# Patient Record
Sex: MALE | Race: BLACK OR AFRICAN AMERICAN | NOT HISPANIC OR LATINO
[De-identification: names, ages, dates, MRNs, and addresses within clinical notes are randomized per-mention and may not be internally consistent; named-entity substitution may affect disease eponyms.]

---

## 2019-05-09 ENCOUNTER — RESULT REVIEW (OUTPATIENT)
Age: 44
End: 2019-05-09

## 2019-05-09 ENCOUNTER — INPATIENT (INPATIENT)
Facility: HOSPITAL | Age: 44
LOS: 7 days | Discharge: HOME HEALTH | DRG: 219 | End: 2019-05-17
Attending: THORACIC SURGERY (CARDIOTHORACIC VASCULAR SURGERY) | Admitting: THORACIC SURGERY (CARDIOTHORACIC VASCULAR SURGERY)
Payer: COMMERCIAL

## 2019-05-09 ENCOUNTER — APPOINTMENT (OUTPATIENT)
Dept: CARDIOTHORACIC SURGERY | Facility: HOSPITAL | Age: 44
End: 2019-05-09
Payer: COMMERCIAL

## 2019-05-09 LAB
ALBUMIN SERPL ELPH-MCNC: 3.4 G/DL — SIGNIFICANT CHANGE UP (ref 3.3–5)
ALP SERPL-CCNC: 29 U/L — LOW (ref 40–120)
ALT FLD-CCNC: 13 U/L — SIGNIFICANT CHANGE UP (ref 10–45)
AMPHET UR-MCNC: NEGATIVE — SIGNIFICANT CHANGE UP
ANION GAP SERPL CALC-SCNC: 13 MMOL/L — SIGNIFICANT CHANGE UP (ref 5–17)
ANISOCYTOSIS BLD QL: SLIGHT — SIGNIFICANT CHANGE UP
APTT BLD: 31.6 SEC — SIGNIFICANT CHANGE UP (ref 27.5–36.3)
AST SERPL-CCNC: 18 U/L — SIGNIFICANT CHANGE UP (ref 10–40)
BARBITURATES UR SCN-MCNC: NEGATIVE — SIGNIFICANT CHANGE UP
BASOPHILS # BLD AUTO: 0.06 K/UL — SIGNIFICANT CHANGE UP (ref 0–0.2)
BASOPHILS NFR BLD AUTO: 0.9 % — SIGNIFICANT CHANGE UP (ref 0–2)
BENZODIAZ UR-MCNC: NEGATIVE — SIGNIFICANT CHANGE UP
BILIRUB SERPL-MCNC: 0.3 MG/DL — SIGNIFICANT CHANGE UP (ref 0.2–1.2)
BUN SERPL-MCNC: 14 MG/DL — SIGNIFICANT CHANGE UP (ref 7–23)
BURR CELLS BLD QL SMEAR: PRESENT — SIGNIFICANT CHANGE UP
CALCIUM SERPL-MCNC: 9.1 MG/DL — SIGNIFICANT CHANGE UP (ref 8.4–10.5)
CHLORIDE SERPL-SCNC: 104 MMOL/L — SIGNIFICANT CHANGE UP (ref 96–108)
CO2 SERPL-SCNC: 22 MMOL/L — SIGNIFICANT CHANGE UP (ref 22–31)
COCAINE METAB.OTHER UR-MCNC: NEGATIVE — SIGNIFICANT CHANGE UP
CREAT SERPL-MCNC: 1.2 MG/DL — SIGNIFICANT CHANGE UP (ref 0.5–1.3)
D DIMER BLD IA.RAPID-MCNC: 2380 NG/ML DDU — HIGH
DACRYOCYTES BLD QL SMEAR: SLIGHT — SIGNIFICANT CHANGE UP
EOSINOPHIL # BLD AUTO: 0 K/UL — SIGNIFICANT CHANGE UP (ref 0–0.5)
EOSINOPHIL NFR BLD AUTO: 0 % — SIGNIFICANT CHANGE UP (ref 0–6)
FIBRINOGEN PPP-MCNC: 244 MG/DL — LOW (ref 258–438)
GIANT PLATELETS BLD QL SMEAR: PRESENT — SIGNIFICANT CHANGE UP
GLUCOSE SERPL-MCNC: 134 MG/DL — HIGH (ref 70–99)
HCT VFR BLD CALC: 37.4 % — LOW (ref 39–50)
HGB BLD-MCNC: 11.7 G/DL — LOW (ref 13–17)
INR BLD: 1.32 — HIGH (ref 0.88–1.16)
LACTATE SERPL-SCNC: 2.1 MMOL/L — HIGH (ref 0.5–2)
LYMPHOCYTES # BLD AUTO: 0.6 K/UL — LOW (ref 1–3.3)
LYMPHOCYTES # BLD AUTO: 8.8 % — LOW (ref 13–44)
MANUAL SMEAR VERIFICATION: SIGNIFICANT CHANGE UP
MCHC RBC-ENTMCNC: 21.7 PG — LOW (ref 27–34)
MCHC RBC-ENTMCNC: 31.3 GM/DL — LOW (ref 32–36)
MCV RBC AUTO: 69.5 FL — LOW (ref 80–100)
METHADONE UR-MCNC: NEGATIVE — SIGNIFICANT CHANGE UP
MONOCYTES # BLD AUTO: 0.3 K/UL — SIGNIFICANT CHANGE UP (ref 0–0.9)
MONOCYTES NFR BLD AUTO: 4.4 % — SIGNIFICANT CHANGE UP (ref 2–14)
NEUTROPHILS # BLD AUTO: 5.78 K/UL — SIGNIFICANT CHANGE UP (ref 1.8–7.4)
NEUTROPHILS NFR BLD AUTO: 78 % — HIGH (ref 43–77)
NEUTS BAND # BLD: 7 % — SIGNIFICANT CHANGE UP (ref 0–8)
NRBC # BLD: 1 /100 — HIGH (ref 0–0)
NRBC # BLD: SIGNIFICANT CHANGE UP /100 WBCS (ref 0–0)
OPIATES UR-MCNC: NEGATIVE — SIGNIFICANT CHANGE UP
OVALOCYTES BLD QL SMEAR: SLIGHT — SIGNIFICANT CHANGE UP
PCP SPEC-MCNC: SIGNIFICANT CHANGE UP
PCP UR-MCNC: NEGATIVE — SIGNIFICANT CHANGE UP
PLAT MORPH BLD: ABNORMAL
PLATELET # BLD AUTO: 182 K/UL — SIGNIFICANT CHANGE UP (ref 150–400)
POIKILOCYTOSIS BLD QL AUTO: SLIGHT — SIGNIFICANT CHANGE UP
POTASSIUM SERPL-MCNC: 4.1 MMOL/L — SIGNIFICANT CHANGE UP (ref 3.5–5.3)
POTASSIUM SERPL-SCNC: 4.1 MMOL/L — SIGNIFICANT CHANGE UP (ref 3.5–5.3)
PROT SERPL-MCNC: 6.3 G/DL — SIGNIFICANT CHANGE UP (ref 6–8.3)
PROTHROM AB SERPL-ACNC: 15 SEC — HIGH (ref 10–12.9)
RBC # BLD: 5.38 M/UL — SIGNIFICANT CHANGE UP (ref 4.2–5.8)
RBC # FLD: 17.7 % — HIGH (ref 10.3–14.5)
RBC BLD AUTO: ABNORMAL
SCHISTOCYTES BLD QL AUTO: SLIGHT — SIGNIFICANT CHANGE UP
SODIUM SERPL-SCNC: 139 MMOL/L — SIGNIFICANT CHANGE UP (ref 135–145)
THC UR QL: NEGATIVE — SIGNIFICANT CHANGE UP
VARIANT LYMPHS # BLD: 0.9 % — SIGNIFICANT CHANGE UP (ref 0–6)
WBC # BLD: 6.8 K/UL — SIGNIFICANT CHANGE UP (ref 3.8–10.5)
WBC # FLD AUTO: 6.8 K/UL — SIGNIFICANT CHANGE UP (ref 3.8–10.5)

## 2019-05-09 PROCEDURE — 35206 REPAIR BLOOD VESSEL DIR UXTR: CPT | Mod: 80,59

## 2019-05-09 PROCEDURE — 35206 REPAIR BLOOD VESSEL DIR UXTR: CPT | Mod: 59

## 2019-05-09 PROCEDURE — 33866 AORTIC HEMIARCH GRAFT: CPT | Mod: 59

## 2019-05-09 PROCEDURE — 33860: CPT | Mod: 22

## 2019-05-09 PROCEDURE — 33866 AORTIC HEMIARCH GRAFT: CPT | Mod: 80,59

## 2019-05-09 PROCEDURE — 99223 1ST HOSP IP/OBS HIGH 75: CPT

## 2019-05-09 PROCEDURE — 33860: CPT | Mod: 80,22

## 2019-05-09 PROCEDURE — 33391 VALVULOPLASTY AORTIC VALVE: CPT

## 2019-05-09 PROCEDURE — 33880 EVASC RPR TA NDGFT COV LSA: CPT | Mod: 80,59

## 2019-05-09 PROCEDURE — 33880 EVASC RPR TA NDGFT COV LSA: CPT

## 2019-05-09 RX ORDER — SODIUM CHLORIDE 9 MG/ML
1000 INJECTION INTRAMUSCULAR; INTRAVENOUS; SUBCUTANEOUS
Refills: 0 | Status: DISCONTINUED | OUTPATIENT
Start: 2019-05-10 | End: 2019-05-15

## 2019-05-09 RX ORDER — VASOPRESSIN 20 [USP'U]/ML
0.04 INJECTION INTRAVENOUS
Qty: 50 | Refills: 0 | Status: DISCONTINUED | OUTPATIENT
Start: 2019-05-09 | End: 2019-05-10

## 2019-05-09 RX ORDER — DEXTROSE 50 % IN WATER 50 %
50 SYRINGE (ML) INTRAVENOUS
Refills: 0 | Status: DISCONTINUED | OUTPATIENT
Start: 2019-05-10 | End: 2019-05-16

## 2019-05-09 RX ORDER — MEPERIDINE HYDROCHLORIDE 50 MG/ML
25 INJECTION INTRAMUSCULAR; INTRAVENOUS; SUBCUTANEOUS ONCE
Refills: 0 | Status: DISCONTINUED | OUTPATIENT
Start: 2019-05-10 | End: 2019-05-10

## 2019-05-09 RX ORDER — DEXTROSE 50 % IN WATER 50 %
25 SYRINGE (ML) INTRAVENOUS
Refills: 0 | Status: DISCONTINUED | OUTPATIENT
Start: 2019-05-10 | End: 2019-05-16

## 2019-05-09 RX ORDER — CHLORHEXIDINE GLUCONATE 213 G/1000ML
1 SOLUTION TOPICAL DAILY
Refills: 0 | Status: DISCONTINUED | OUTPATIENT
Start: 2019-05-10 | End: 2019-05-15

## 2019-05-10 VITALS — OXYGEN SATURATION: 100 % | HEART RATE: 60 BPM | RESPIRATION RATE: 12 BRPM

## 2019-05-10 PROBLEM — Z00.00 ENCOUNTER FOR PREVENTIVE HEALTH EXAMINATION: Status: ACTIVE | Noted: 2019-05-10

## 2019-05-10 LAB
ALBUMIN SERPL ELPH-MCNC: 3.1 G/DL — LOW (ref 3.3–5)
ALBUMIN SERPL ELPH-MCNC: 3.4 G/DL — SIGNIFICANT CHANGE UP (ref 3.3–5)
ALBUMIN SERPL ELPH-MCNC: 3.5 G/DL — SIGNIFICANT CHANGE UP (ref 3.3–5)
ALBUMIN SERPL ELPH-MCNC: 3.6 G/DL — SIGNIFICANT CHANGE UP (ref 3.3–5)
ALBUMIN SERPL ELPH-MCNC: 3.7 G/DL — SIGNIFICANT CHANGE UP (ref 3.3–5)
ALP SERPL-CCNC: 36 U/L — LOW (ref 40–120)
ALP SERPL-CCNC: 38 U/L — LOW (ref 40–120)
ALP SERPL-CCNC: 38 U/L — LOW (ref 40–120)
ALP SERPL-CCNC: 41 U/L — SIGNIFICANT CHANGE UP (ref 40–120)
ALP SERPL-CCNC: 42 U/L — SIGNIFICANT CHANGE UP (ref 40–120)
ALT FLD-CCNC: 23 U/L — SIGNIFICANT CHANGE UP (ref 10–45)
ALT FLD-CCNC: 25 U/L — SIGNIFICANT CHANGE UP (ref 10–45)
ALT FLD-CCNC: 26 U/L — SIGNIFICANT CHANGE UP (ref 10–45)
ALT FLD-CCNC: 27 U/L — SIGNIFICANT CHANGE UP (ref 10–45)
ALT FLD-CCNC: 30 U/L — SIGNIFICANT CHANGE UP (ref 10–45)
ANION GAP SERPL CALC-SCNC: 11 MMOL/L — SIGNIFICANT CHANGE UP (ref 5–17)
ANION GAP SERPL CALC-SCNC: 12 MMOL/L — SIGNIFICANT CHANGE UP (ref 5–17)
ANION GAP SERPL CALC-SCNC: 14 MMOL/L — SIGNIFICANT CHANGE UP (ref 5–17)
APTT BLD: 27.7 SEC — SIGNIFICANT CHANGE UP (ref 27.5–36.3)
APTT BLD: 28.6 SEC — SIGNIFICANT CHANGE UP (ref 27.5–36.3)
APTT BLD: 29.8 SEC — SIGNIFICANT CHANGE UP (ref 27.5–36.3)
APTT BLD: 29.9 SEC — SIGNIFICANT CHANGE UP (ref 27.5–36.3)
APTT BLD: 30.4 SEC — SIGNIFICANT CHANGE UP (ref 27.5–36.3)
AST SERPL-CCNC: 35 U/L — SIGNIFICANT CHANGE UP (ref 10–40)
AST SERPL-CCNC: 38 U/L — SIGNIFICANT CHANGE UP (ref 10–40)
AST SERPL-CCNC: 39 U/L — SIGNIFICANT CHANGE UP (ref 10–40)
AST SERPL-CCNC: 43 U/L — HIGH (ref 10–40)
AST SERPL-CCNC: 45 U/L — HIGH (ref 10–40)
BASOPHILS # BLD AUTO: 0.01 K/UL — SIGNIFICANT CHANGE UP (ref 0–0.2)
BASOPHILS NFR BLD AUTO: 0.1 % — SIGNIFICANT CHANGE UP (ref 0–2)
BILIRUB SERPL-MCNC: 0.4 MG/DL — SIGNIFICANT CHANGE UP (ref 0.2–1.2)
BILIRUB SERPL-MCNC: 0.6 MG/DL — SIGNIFICANT CHANGE UP (ref 0.2–1.2)
BILIRUB SERPL-MCNC: 0.8 MG/DL — SIGNIFICANT CHANGE UP (ref 0.2–1.2)
BUN SERPL-MCNC: 14 MG/DL — SIGNIFICANT CHANGE UP (ref 7–23)
BUN SERPL-MCNC: 14 MG/DL — SIGNIFICANT CHANGE UP (ref 7–23)
BUN SERPL-MCNC: 17 MG/DL — SIGNIFICANT CHANGE UP (ref 7–23)
BUN SERPL-MCNC: 19 MG/DL — SIGNIFICANT CHANGE UP (ref 7–23)
BUN SERPL-MCNC: 21 MG/DL — SIGNIFICANT CHANGE UP (ref 7–23)
CALCIUM SERPL-MCNC: 10.3 MG/DL — SIGNIFICANT CHANGE UP (ref 8.4–10.5)
CALCIUM SERPL-MCNC: 9.1 MG/DL — SIGNIFICANT CHANGE UP (ref 8.4–10.5)
CALCIUM SERPL-MCNC: 9.1 MG/DL — SIGNIFICANT CHANGE UP (ref 8.4–10.5)
CALCIUM SERPL-MCNC: 9.3 MG/DL — SIGNIFICANT CHANGE UP (ref 8.4–10.5)
CALCIUM SERPL-MCNC: 9.9 MG/DL — SIGNIFICANT CHANGE UP (ref 8.4–10.5)
CHLORIDE SERPL-SCNC: 102 MMOL/L — SIGNIFICANT CHANGE UP (ref 96–108)
CHLORIDE SERPL-SCNC: 104 MMOL/L — SIGNIFICANT CHANGE UP (ref 96–108)
CHLORIDE SERPL-SCNC: 104 MMOL/L — SIGNIFICANT CHANGE UP (ref 96–108)
CHOLEST SERPL-MCNC: 152 MG/DL — SIGNIFICANT CHANGE UP (ref 10–199)
CO2 SERPL-SCNC: 25 MMOL/L — SIGNIFICANT CHANGE UP (ref 22–31)
CO2 SERPL-SCNC: 25 MMOL/L — SIGNIFICANT CHANGE UP (ref 22–31)
CO2 SERPL-SCNC: 26 MMOL/L — SIGNIFICANT CHANGE UP (ref 22–31)
CO2 SERPL-SCNC: 26 MMOL/L — SIGNIFICANT CHANGE UP (ref 22–31)
CO2 SERPL-SCNC: 27 MMOL/L — SIGNIFICANT CHANGE UP (ref 22–31)
CREAT SERPL-MCNC: 1.24 MG/DL — SIGNIFICANT CHANGE UP (ref 0.5–1.3)
CREAT SERPL-MCNC: 1.27 MG/DL — SIGNIFICANT CHANGE UP (ref 0.5–1.3)
CREAT SERPL-MCNC: 1.29 MG/DL — SIGNIFICANT CHANGE UP (ref 0.5–1.3)
CREAT SERPL-MCNC: 1.38 MG/DL — HIGH (ref 0.5–1.3)
CREAT SERPL-MCNC: 1.4 MG/DL — HIGH (ref 0.5–1.3)
EOSINOPHIL # BLD AUTO: 0.02 K/UL — SIGNIFICANT CHANGE UP (ref 0–0.5)
EOSINOPHIL NFR BLD AUTO: 0.1 % — SIGNIFICANT CHANGE UP (ref 0–6)
GAS PNL BLDA: SIGNIFICANT CHANGE UP
GLUCOSE BLDC GLUCOMTR-MCNC: 105 MG/DL — HIGH (ref 70–99)
GLUCOSE BLDC GLUCOMTR-MCNC: 107 MG/DL — HIGH (ref 70–99)
GLUCOSE BLDC GLUCOMTR-MCNC: 110 MG/DL — HIGH (ref 70–99)
GLUCOSE BLDC GLUCOMTR-MCNC: 122 MG/DL — HIGH (ref 70–99)
GLUCOSE BLDC GLUCOMTR-MCNC: 131 MG/DL — HIGH (ref 70–99)
GLUCOSE BLDC GLUCOMTR-MCNC: 131 MG/DL — HIGH (ref 70–99)
GLUCOSE BLDC GLUCOMTR-MCNC: 136 MG/DL — HIGH (ref 70–99)
GLUCOSE BLDC GLUCOMTR-MCNC: 171 MG/DL — HIGH (ref 70–99)
GLUCOSE BLDC GLUCOMTR-MCNC: 71 MG/DL — SIGNIFICANT CHANGE UP (ref 70–99)
GLUCOSE BLDC GLUCOMTR-MCNC: 97 MG/DL — SIGNIFICANT CHANGE UP (ref 70–99)
GLUCOSE BLDC GLUCOMTR-MCNC: 98 MG/DL — SIGNIFICANT CHANGE UP (ref 70–99)
GLUCOSE SERPL-MCNC: 114 MG/DL — HIGH (ref 70–99)
GLUCOSE SERPL-MCNC: 116 MG/DL — HIGH (ref 70–99)
GLUCOSE SERPL-MCNC: 134 MG/DL — HIGH (ref 70–99)
GLUCOSE SERPL-MCNC: 182 MG/DL — HIGH (ref 70–99)
GLUCOSE SERPL-MCNC: 93 MG/DL — SIGNIFICANT CHANGE UP (ref 70–99)
HBA1C BLD-MCNC: 5.4 % — SIGNIFICANT CHANGE UP (ref 4–5.6)
HCT VFR BLD CALC: 26.2 % — LOW (ref 39–50)
HCT VFR BLD CALC: 27.5 % — LOW (ref 39–50)
HCT VFR BLD CALC: 28.3 % — LOW (ref 39–50)
HCT VFR BLD CALC: 29.2 % — LOW (ref 39–50)
HCT VFR BLD CALC: 29.3 % — LOW (ref 39–50)
HDLC SERPL-MCNC: 49 MG/DL — SIGNIFICANT CHANGE UP
HGB BLD-MCNC: 8.6 G/DL — LOW (ref 13–17)
HGB BLD-MCNC: 8.9 G/DL — LOW (ref 13–17)
HGB BLD-MCNC: 9 G/DL — LOW (ref 13–17)
HGB BLD-MCNC: 9.2 G/DL — LOW (ref 13–17)
HGB BLD-MCNC: 9.6 G/DL — LOW (ref 13–17)
IMM GRANULOCYTES NFR BLD AUTO: 0.8 % — SIGNIFICANT CHANGE UP (ref 0–1.5)
INR BLD: 1.24 — HIGH (ref 0.88–1.16)
INR BLD: 1.31 — HIGH (ref 0.88–1.16)
INR BLD: 1.34 — HIGH (ref 0.88–1.16)
INR BLD: 1.42 — HIGH (ref 0.88–1.16)
INR BLD: 1.5 — HIGH (ref 0.88–1.16)
LACTATE SERPL-SCNC: 0.9 MMOL/L — SIGNIFICANT CHANGE UP (ref 0.5–2)
LACTATE SERPL-SCNC: 1 MMOL/L — SIGNIFICANT CHANGE UP (ref 0.5–2)
LACTATE SERPL-SCNC: 1.7 MMOL/L — SIGNIFICANT CHANGE UP (ref 0.5–2)
LACTATE SERPL-SCNC: 1.8 MMOL/L — SIGNIFICANT CHANGE UP (ref 0.5–2)
LACTATE SERPL-SCNC: 3 MMOL/L — HIGH (ref 0.5–2)
LIPID PNL WITH DIRECT LDL SERPL: 86 MG/DL — SIGNIFICANT CHANGE UP
LYMPHOCYTES # BLD AUTO: 0.86 K/UL — LOW (ref 1–3.3)
LYMPHOCYTES # BLD AUTO: 6.1 % — LOW (ref 13–44)
MAGNESIUM SERPL-MCNC: 2.2 MG/DL — SIGNIFICANT CHANGE UP (ref 1.6–2.6)
MAGNESIUM SERPL-MCNC: 2.3 MG/DL — SIGNIFICANT CHANGE UP (ref 1.6–2.6)
MAGNESIUM SERPL-MCNC: 2.4 MG/DL — SIGNIFICANT CHANGE UP (ref 1.6–2.6)
MCHC RBC-ENTMCNC: 21.7 PG — LOW (ref 27–34)
MCHC RBC-ENTMCNC: 22 PG — LOW (ref 27–34)
MCHC RBC-ENTMCNC: 22.2 PG — LOW (ref 27–34)
MCHC RBC-ENTMCNC: 22.4 PG — LOW (ref 27–34)
MCHC RBC-ENTMCNC: 22.5 PG — LOW (ref 27–34)
MCHC RBC-ENTMCNC: 31.5 GM/DL — LOW (ref 32–36)
MCHC RBC-ENTMCNC: 31.8 GM/DL — LOW (ref 32–36)
MCHC RBC-ENTMCNC: 32.4 GM/DL — SIGNIFICANT CHANGE UP (ref 32–36)
MCHC RBC-ENTMCNC: 32.8 GM/DL — SIGNIFICANT CHANGE UP (ref 32–36)
MCHC RBC-ENTMCNC: 32.8 GM/DL — SIGNIFICANT CHANGE UP (ref 32–36)
MCV RBC AUTO: 68.3 FL — LOW (ref 80–100)
MCV RBC AUTO: 68.4 FL — LOW (ref 80–100)
MCV RBC AUTO: 68.6 FL — LOW (ref 80–100)
MCV RBC AUTO: 69 FL — LOW (ref 80–100)
MCV RBC AUTO: 69.2 FL — LOW (ref 80–100)
MONOCYTES # BLD AUTO: 0.83 K/UL — SIGNIFICANT CHANGE UP (ref 0–0.9)
MONOCYTES NFR BLD AUTO: 5.9 % — SIGNIFICANT CHANGE UP (ref 2–14)
NEUTROPHILS # BLD AUTO: 12.31 K/UL — HIGH (ref 1.8–7.4)
NEUTROPHILS NFR BLD AUTO: 87 % — HIGH (ref 43–77)
NRBC # BLD: 0 /100 WBCS — SIGNIFICANT CHANGE UP (ref 0–0)
PHOSPHATE SERPL-MCNC: 3.3 MG/DL — SIGNIFICANT CHANGE UP (ref 2.5–4.5)
PHOSPHATE SERPL-MCNC: 3.9 MG/DL — SIGNIFICANT CHANGE UP (ref 2.5–4.5)
PHOSPHATE SERPL-MCNC: 5.2 MG/DL — HIGH (ref 2.5–4.5)
PHOSPHATE SERPL-MCNC: 5.3 MG/DL — HIGH (ref 2.5–4.5)
PHOSPHATE SERPL-MCNC: 5.5 MG/DL — HIGH (ref 2.5–4.5)
PLATELET # BLD AUTO: 162 K/UL — SIGNIFICANT CHANGE UP (ref 150–400)
PLATELET # BLD AUTO: 169 K/UL — SIGNIFICANT CHANGE UP (ref 150–400)
PLATELET # BLD AUTO: 176 K/UL — SIGNIFICANT CHANGE UP (ref 150–400)
PLATELET # BLD AUTO: 177 K/UL — SIGNIFICANT CHANGE UP (ref 150–400)
PLATELET # BLD AUTO: 184 K/UL — SIGNIFICANT CHANGE UP (ref 150–400)
POTASSIUM SERPL-MCNC: 3.6 MMOL/L — SIGNIFICANT CHANGE UP (ref 3.5–5.3)
POTASSIUM SERPL-MCNC: 4 MMOL/L — SIGNIFICANT CHANGE UP (ref 3.5–5.3)
POTASSIUM SERPL-MCNC: 4 MMOL/L — SIGNIFICANT CHANGE UP (ref 3.5–5.3)
POTASSIUM SERPL-MCNC: 4.3 MMOL/L — SIGNIFICANT CHANGE UP (ref 3.5–5.3)
POTASSIUM SERPL-MCNC: 4.3 MMOL/L — SIGNIFICANT CHANGE UP (ref 3.5–5.3)
POTASSIUM SERPL-SCNC: 3.6 MMOL/L — SIGNIFICANT CHANGE UP (ref 3.5–5.3)
POTASSIUM SERPL-SCNC: 4 MMOL/L — SIGNIFICANT CHANGE UP (ref 3.5–5.3)
POTASSIUM SERPL-SCNC: 4 MMOL/L — SIGNIFICANT CHANGE UP (ref 3.5–5.3)
POTASSIUM SERPL-SCNC: 4.3 MMOL/L — SIGNIFICANT CHANGE UP (ref 3.5–5.3)
POTASSIUM SERPL-SCNC: 4.3 MMOL/L — SIGNIFICANT CHANGE UP (ref 3.5–5.3)
PROT SERPL-MCNC: 5.9 G/DL — LOW (ref 6–8.3)
PROT SERPL-MCNC: 6 G/DL — SIGNIFICANT CHANGE UP (ref 6–8.3)
PROT SERPL-MCNC: 6.1 G/DL — SIGNIFICANT CHANGE UP (ref 6–8.3)
PROT SERPL-MCNC: 6.5 G/DL — SIGNIFICANT CHANGE UP (ref 6–8.3)
PROT SERPL-MCNC: 6.8 G/DL — SIGNIFICANT CHANGE UP (ref 6–8.3)
PROTHROM AB SERPL-ACNC: 14.1 SEC — HIGH (ref 10–12.9)
PROTHROM AB SERPL-ACNC: 14.9 SEC — HIGH (ref 10–12.9)
PROTHROM AB SERPL-ACNC: 15.3 SEC — HIGH (ref 10–12.9)
PROTHROM AB SERPL-ACNC: 16.2 SEC — HIGH (ref 10–12.9)
PROTHROM AB SERPL-ACNC: 17.2 SEC — HIGH (ref 10–12.9)
RBC # BLD: 3.83 M/UL — LOW (ref 4.2–5.8)
RBC # BLD: 4.01 M/UL — LOW (ref 4.2–5.8)
RBC # BLD: 4.09 M/UL — LOW (ref 4.2–5.8)
RBC # BLD: 4.23 M/UL — SIGNIFICANT CHANGE UP (ref 4.2–5.8)
RBC # BLD: 4.29 M/UL — SIGNIFICANT CHANGE UP (ref 4.2–5.8)
RBC # FLD: 17.2 % — HIGH (ref 10.3–14.5)
RBC # FLD: 17.4 % — HIGH (ref 10.3–14.5)
RBC # FLD: 17.7 % — HIGH (ref 10.3–14.5)
SODIUM SERPL-SCNC: 139 MMOL/L — SIGNIFICANT CHANGE UP (ref 135–145)
SODIUM SERPL-SCNC: 139 MMOL/L — SIGNIFICANT CHANGE UP (ref 135–145)
SODIUM SERPL-SCNC: 141 MMOL/L — SIGNIFICANT CHANGE UP (ref 135–145)
SODIUM SERPL-SCNC: 141 MMOL/L — SIGNIFICANT CHANGE UP (ref 135–145)
SODIUM SERPL-SCNC: 142 MMOL/L — SIGNIFICANT CHANGE UP (ref 135–145)
T PALLIDUM AB TITR SER: NEGATIVE — SIGNIFICANT CHANGE UP
T PALLIDUM AB TITR SER: NEGATIVE — SIGNIFICANT CHANGE UP
TOTAL CHOLESTEROL/HDL RATIO MEASUREMENT: 3.1 RATIO — LOW (ref 3.4–9.6)
TRIGL SERPL-MCNC: 86 MG/DL — SIGNIFICANT CHANGE UP (ref 10–149)
WBC # BLD: 10.41 K/UL — SIGNIFICANT CHANGE UP (ref 3.8–10.5)
WBC # BLD: 10.89 K/UL — HIGH (ref 3.8–10.5)
WBC # BLD: 11.24 K/UL — HIGH (ref 3.8–10.5)
WBC # BLD: 14.14 K/UL — HIGH (ref 3.8–10.5)
WBC # BLD: 8.5 K/UL — SIGNIFICANT CHANGE UP (ref 3.8–10.5)
WBC # FLD AUTO: 10.41 K/UL — SIGNIFICANT CHANGE UP (ref 3.8–10.5)
WBC # FLD AUTO: 10.89 K/UL — HIGH (ref 3.8–10.5)
WBC # FLD AUTO: 11.24 K/UL — HIGH (ref 3.8–10.5)
WBC # FLD AUTO: 14.14 K/UL — HIGH (ref 3.8–10.5)
WBC # FLD AUTO: 8.5 K/UL — SIGNIFICANT CHANGE UP (ref 3.8–10.5)

## 2019-05-10 PROCEDURE — 71045 X-RAY EXAM CHEST 1 VIEW: CPT | Mod: 26

## 2019-05-10 PROCEDURE — 99292 CRITICAL CARE ADDL 30 MIN: CPT

## 2019-05-10 PROCEDURE — 99291 CRITICAL CARE FIRST HOUR: CPT

## 2019-05-10 PROCEDURE — 93010 ELECTROCARDIOGRAM REPORT: CPT

## 2019-05-10 RX ORDER — SENNA PLUS 8.6 MG/1
2 TABLET ORAL AT BEDTIME
Refills: 0 | Status: DISCONTINUED | OUTPATIENT
Start: 2019-05-10 | End: 2019-05-17

## 2019-05-10 RX ORDER — PROPOFOL 10 MG/ML
30 INJECTION, EMULSION INTRAVENOUS
Qty: 1000 | Refills: 0 | Status: DISCONTINUED | OUTPATIENT
Start: 2019-05-10 | End: 2019-05-10

## 2019-05-10 RX ORDER — CLEVIDIPINE BUTYRATE 50MG/100ML
15 VIAL (ML) INTRAVENOUS
Qty: 25 | Refills: 0 | Status: DISCONTINUED | OUTPATIENT
Start: 2019-05-10 | End: 2019-05-12

## 2019-05-10 RX ORDER — GLUCAGON INJECTION, SOLUTION 0.5 MG/.1ML
1 INJECTION, SOLUTION SUBCUTANEOUS ONCE
Refills: 0 | Status: DISCONTINUED | OUTPATIENT
Start: 2019-05-10 | End: 2019-05-16

## 2019-05-10 RX ORDER — ACETAMINOPHEN 500 MG
1000 TABLET ORAL ONCE
Refills: 0 | Status: COMPLETED | OUTPATIENT
Start: 2019-05-10 | End: 2019-05-10

## 2019-05-10 RX ORDER — CHLORHEXIDINE GLUCONATE 213 G/1000ML
5 SOLUTION TOPICAL EVERY 4 HOURS
Refills: 0 | Status: DISCONTINUED | OUTPATIENT
Start: 2019-05-10 | End: 2019-05-10

## 2019-05-10 RX ORDER — HEPARIN SODIUM 5000 [USP'U]/ML
7500 INJECTION INTRAVENOUS; SUBCUTANEOUS EVERY 8 HOURS
Refills: 0 | Status: DISCONTINUED | OUTPATIENT
Start: 2019-05-10 | End: 2019-05-15

## 2019-05-10 RX ORDER — CEFAZOLIN SODIUM 1 G
2000 VIAL (EA) INJECTION EVERY 8 HOURS
Refills: 0 | Status: DISCONTINUED | OUTPATIENT
Start: 2019-05-10 | End: 2019-05-10

## 2019-05-10 RX ORDER — EPINEPHRINE 0.3 MG/.3ML
0.02 INJECTION INTRAMUSCULAR; SUBCUTANEOUS
Qty: 4 | Refills: 0 | Status: DISCONTINUED | OUTPATIENT
Start: 2019-05-10 | End: 2019-05-10

## 2019-05-10 RX ORDER — HEPARIN SODIUM 5000 [USP'U]/ML
5000 INJECTION INTRAVENOUS; SUBCUTANEOUS EVERY 8 HOURS
Refills: 0 | Status: DISCONTINUED | OUTPATIENT
Start: 2019-05-10 | End: 2019-05-10

## 2019-05-10 RX ORDER — ESMOLOL HCL 100MG/10ML
50 VIAL (ML) INTRAVENOUS
Qty: 2500 | Refills: 0 | Status: DISCONTINUED | OUTPATIENT
Start: 2019-05-10 | End: 2019-05-10

## 2019-05-10 RX ORDER — CEFAZOLIN SODIUM 1 G
2000 VIAL (EA) INJECTION EVERY 8 HOURS
Refills: 0 | Status: COMPLETED | OUTPATIENT
Start: 2019-05-10 | End: 2019-05-11

## 2019-05-10 RX ORDER — DOCUSATE SODIUM 100 MG
100 CAPSULE ORAL THREE TIMES A DAY
Refills: 0 | Status: DISCONTINUED | OUTPATIENT
Start: 2019-05-10 | End: 2019-05-17

## 2019-05-10 RX ORDER — OXYCODONE AND ACETAMINOPHEN 5; 325 MG/1; MG/1
2 TABLET ORAL EVERY 6 HOURS
Refills: 0 | Status: DISCONTINUED | OUTPATIENT
Start: 2019-05-10 | End: 2019-05-12

## 2019-05-10 RX ORDER — ACETAMINOPHEN 500 MG
650 TABLET ORAL EVERY 6 HOURS
Refills: 0 | Status: DISCONTINUED | OUTPATIENT
Start: 2019-05-10 | End: 2019-05-11

## 2019-05-10 RX ORDER — INSULIN HUMAN 100 [IU]/ML
1 INJECTION, SOLUTION SUBCUTANEOUS
Qty: 100 | Refills: 0 | Status: DISCONTINUED | OUTPATIENT
Start: 2019-05-10 | End: 2019-05-10

## 2019-05-10 RX ORDER — POTASSIUM CHLORIDE 20 MEQ
20 PACKET (EA) ORAL ONCE
Refills: 0 | Status: COMPLETED | OUTPATIENT
Start: 2019-05-10 | End: 2019-05-10

## 2019-05-10 RX ORDER — OXYCODONE AND ACETAMINOPHEN 5; 325 MG/1; MG/1
1 TABLET ORAL EVERY 6 HOURS
Refills: 0 | Status: DISCONTINUED | OUTPATIENT
Start: 2019-05-10 | End: 2019-05-17

## 2019-05-10 RX ORDER — DEXMEDETOMIDINE HYDROCHLORIDE IN 0.9% SODIUM CHLORIDE 4 UG/ML
0.2 INJECTION INTRAVENOUS
Qty: 200 | Refills: 0 | Status: DISCONTINUED | OUTPATIENT
Start: 2019-05-10 | End: 2019-05-10

## 2019-05-10 RX ORDER — INSULIN LISPRO 100/ML
VIAL (ML) SUBCUTANEOUS
Refills: 0 | Status: DISCONTINUED | OUTPATIENT
Start: 2019-05-10 | End: 2019-05-16

## 2019-05-10 RX ORDER — PANTOPRAZOLE SODIUM 20 MG/1
40 TABLET, DELAYED RELEASE ORAL DAILY
Refills: 0 | Status: DISCONTINUED | OUTPATIENT
Start: 2019-05-10 | End: 2019-05-10

## 2019-05-10 RX ORDER — SODIUM CHLORIDE 9 MG/ML
1000 INJECTION, SOLUTION INTRAVENOUS
Refills: 0 | Status: DISCONTINUED | OUTPATIENT
Start: 2019-05-10 | End: 2019-05-16

## 2019-05-10 RX ORDER — POLYETHYLENE GLYCOL 3350 17 G/17G
17 POWDER, FOR SOLUTION ORAL DAILY
Refills: 0 | Status: DISCONTINUED | OUTPATIENT
Start: 2019-05-10 | End: 2019-05-17

## 2019-05-10 RX ORDER — PANTOPRAZOLE SODIUM 20 MG/1
40 TABLET, DELAYED RELEASE ORAL
Refills: 0 | Status: DISCONTINUED | OUTPATIENT
Start: 2019-05-10 | End: 2019-05-17

## 2019-05-10 RX ORDER — DEXTROSE 50 % IN WATER 50 %
15 SYRINGE (ML) INTRAVENOUS ONCE
Refills: 0 | Status: DISCONTINUED | OUTPATIENT
Start: 2019-05-10 | End: 2019-05-16

## 2019-05-10 RX ORDER — FENTANYL CITRATE 50 UG/ML
25 INJECTION INTRAVENOUS EVERY 4 HOURS
Refills: 0 | Status: DISCONTINUED | OUTPATIENT
Start: 2019-05-10 | End: 2019-05-10

## 2019-05-10 RX ADMIN — Medication 2.5 MILLIGRAM(S): at 11:05

## 2019-05-10 RX ADMIN — Medication 400 MILLIGRAM(S): at 16:28

## 2019-05-10 RX ADMIN — OXYCODONE AND ACETAMINOPHEN 2 TABLET(S): 5; 325 TABLET ORAL at 21:31

## 2019-05-10 RX ADMIN — Medication 2000 MILLIGRAM(S): at 22:12

## 2019-05-10 RX ADMIN — Medication 100 MILLIGRAM(S): at 22:10

## 2019-05-10 RX ADMIN — CHLORHEXIDINE GLUCONATE 5 MILLILITER(S): 213 SOLUTION TOPICAL at 06:41

## 2019-05-10 RX ADMIN — CHLORHEXIDINE GLUCONATE 5 MILLILITER(S): 213 SOLUTION TOPICAL at 13:43

## 2019-05-10 RX ADMIN — PANTOPRAZOLE SODIUM 40 MILLIGRAM(S): 20 TABLET, DELAYED RELEASE ORAL at 11:09

## 2019-05-10 RX ADMIN — Medication 100 MILLIEQUIVALENT(S): at 01:22

## 2019-05-10 RX ADMIN — FENTANYL CITRATE 25 MICROGRAM(S): 50 INJECTION INTRAVENOUS at 00:15

## 2019-05-10 RX ADMIN — SENNA PLUS 2 TABLET(S): 8.6 TABLET ORAL at 22:10

## 2019-05-10 RX ADMIN — FENTANYL CITRATE 25 MICROGRAM(S): 50 INJECTION INTRAVENOUS at 06:16

## 2019-05-10 RX ADMIN — Medication 2.5 MILLIGRAM(S): at 19:45

## 2019-05-10 RX ADMIN — Medication 2000 MILLIGRAM(S): at 13:50

## 2019-05-10 RX ADMIN — HEPARIN SODIUM 7500 UNIT(S): 5000 INJECTION INTRAVENOUS; SUBCUTANEOUS at 13:43

## 2019-05-10 RX ADMIN — CHLORHEXIDINE GLUCONATE 1 APPLICATION(S): 213 SOLUTION TOPICAL at 11:09

## 2019-05-10 RX ADMIN — FENTANYL CITRATE 25 MICROGRAM(S): 50 INJECTION INTRAVENOUS at 09:56

## 2019-05-10 RX ADMIN — Medication 2.5 MILLIGRAM(S): at 12:22

## 2019-05-10 RX ADMIN — HEPARIN SODIUM 7500 UNIT(S): 5000 INJECTION INTRAVENOUS; SUBCUTANEOUS at 22:10

## 2019-05-10 RX ADMIN — Medication 30 MG/HR: at 09:56

## 2019-05-10 RX ADMIN — FENTANYL CITRATE 25 MICROGRAM(S): 50 INJECTION INTRAVENOUS at 06:01

## 2019-05-10 RX ADMIN — Medication 1000 MILLIGRAM(S): at 16:29

## 2019-05-10 RX ADMIN — Medication 100 MILLIEQUIVALENT(S): at 17:49

## 2019-05-10 RX ADMIN — PROPOFOL 21.6 MICROGRAM(S)/KG/MIN: 10 INJECTION, EMULSION INTRAVENOUS at 09:03

## 2019-05-10 RX ADMIN — FENTANYL CITRATE 25 MICROGRAM(S): 50 INJECTION INTRAVENOUS at 00:00

## 2019-05-10 RX ADMIN — Medication 400 MILLIGRAM(S): at 02:37

## 2019-05-10 RX ADMIN — OXYCODONE AND ACETAMINOPHEN 2 TABLET(S): 5; 325 TABLET ORAL at 22:03

## 2019-05-10 RX ADMIN — CHLORHEXIDINE GLUCONATE 5 MILLILITER(S): 213 SOLUTION TOPICAL at 09:29

## 2019-05-10 RX ADMIN — Medication 1000 MILLIGRAM(S): at 02:52

## 2019-05-10 RX ADMIN — PANTOPRAZOLE SODIUM 40 MILLIGRAM(S): 20 TABLET, DELAYED RELEASE ORAL at 17:49

## 2019-05-10 RX ADMIN — DEXMEDETOMIDINE HYDROCHLORIDE IN 0.9% SODIUM CHLORIDE 6 MICROGRAM(S)/KG/HR: 4 INJECTION INTRAVENOUS at 10:52

## 2019-05-10 RX ADMIN — FENTANYL CITRATE 25 MICROGRAM(S): 50 INJECTION INTRAVENOUS at 09:03

## 2019-05-10 RX ADMIN — CHLORHEXIDINE GLUCONATE 5 MILLILITER(S): 213 SOLUTION TOPICAL at 02:43

## 2019-05-10 RX ADMIN — Medication 2000 MILLIGRAM(S): at 06:01

## 2019-05-10 NOTE — PHYSICAL THERAPY INITIAL EVALUATION ADULT - ADDITIONAL COMMENTS
Patient reports that he has 3-5 steps to enter his apartment building. States that he lives alone and was functionally independent prior to admission.

## 2019-05-10 NOTE — H&P ADULT - NSHPPHYSICALEXAM_GEN_ALL_CORE
Appearance: No acute distress.  Neurologic: Intubated, sedated and unresponsive.    HEENT:   MMM, PERRLA  Neck: Supple,  - JVD; - Carotid Bruit   Cardiovascular: RRR, S1/S2. No m/r/g.  Respiratory: Intubated. CTA b/l, no w/r/r.   Gastrointestinal:  Soft, non-tender, non-distended, + BS.	  Skin: No rashes. No ecchymoses. No cyanosis.  Extremities: Exhibits normal range of motion, no clubbing, cyanosis or edema.  Vascular: Peripheral pulses palpable 2+ bilaterally.

## 2019-05-10 NOTE — PROGRESS NOTE ADULT - SUBJECTIVE AND OBJECTIVE BOX
INTERVAL HPI/OVERNIGHT EVENTS:    Op Day: emergent repair Type A aortic dissection  EF 50%    42yo Male with no known Med Hx presenting to [place] ED 5/9 with acute onset SS CP radiating to back with associated dizziness and b/l upper extremities paresthesia.  VS stable on presentation  (143/76) - TYRESE noted (1.5) and CE (-)    CTa C/A/P: acute Type A dissection    emergent transfer and taken to OR    intraop 6 FFP/2 platelet/10 cryo given; 3L Crystelloid per anesthesia record    arrived to ICU [place] 5/10  transiently on Epi - rapidy titrated off and now on Cleviprex and Esmolol    ICU Vital Signs Last 24 Hrs  T(C): 37.1 (10 May 2019 09:00), Max: 37.1 (10 May 2019 09:00)  T(F): 98.7 (10 May 2019 09:00), Max: 98.7 (10 May 2019 09:00)  HR: 64 (10 May 2019 09:00) (60 - 74) sinus  ABP: 114/48 (10 May 2019 09:00) (111/45 - 152/63)  ABP(mean): 66 (10 May 2019 09:00) (66 - 98)  SpO2: 100% (10 May 2019 09:00) (99% - 100%) Fi02 50%    Qtts:   Cleviprex 15  esmolol 30  insulin   Propofol    I&O's Summary    09 May 2019 07:01  -  10 May 2019 07:00  --------------------------------------------------------  IN: 772.3 mL / OUT: 835 mL / NET: -62.7 mL    10 May 2019 07:01  -  10 May 2019 09:43  --------------------------------------------------------  IN: 229.4 mL / OUT: 100 mL / NET: 129.4 mL    Vent settings: AC 12/500/50/5    Physical Exam    Heart  Lungs  Abd  Ext  Chest  Neuro  Skin    LABS:                        8.9    11.24 )-----------( 169      ( 10 May 2019 02:55 )             27.5     05-10    142  |  104  |  14  ----------------------------<  134<H>  4.0   |  27  |  1.29    Ca    9.9      10 May 2019 02:55  Phos  3.9     05-10  Mg     2.3     05-10    TPro  6.0  /  Alb  3.7  /  TBili  0.8  /  DBili  x   /  AST  43<H>  /  ALT  30  /  AlkPhos  38<L>  05-10    PT/INR - ( 10 May 2019 02:55 )   PT: 15.3 sec;   INR: 1.34          PTT - ( 10 May 2019 02:55 )  PTT:28.6 sec    ABG - ( 10 May 2019 02:55 )  pH, Arterial: 7.38  pH, Blood: x     /  pCO2: 47    /  pO2: 152   / HCO3: 27    / Base Excess: 1.7   /  SaO2: 99                  RADIOLOGY & ADDITIONAL STUDIES:    I have spent/provided stated minutes of critical care time to this patient: INTERVAL HPI/OVERNIGHT EVENTS:    Op Day: emergent repair Type A aortic dissection  EF 50%    44yo Male with no known Med Hx presenting to [place] ED 5/9 with acute onset SS CP radiating to back with associated dizziness and b/l upper extremities paresthesia.  VS stable on presentation  (143/76) - TYRESE noted (1.5) and CE (-)    CTa C/A/P: acute Type A dissection    emergent transfer and taken to OR    intraop 6 FFP/2 platelet/10 cryo given; 3L Crystelloid per anesthesia record    arrived to ICU [place] 5/10  transiently on Epi - rapidy titrated off and now on Cleviprex and Esmolol    ICU Vital Signs Last 24 Hrs  T(C): 37.1 (10 May 2019 09:00), Max: 37.1 (10 May 2019 09:00)  T(F): 98.7 (10 May 2019 09:00), Max: 98.7 (10 May 2019 09:00)  HR: 64 (10 May 2019 09:00) (60 - 74) sinus  ABP: 114/48 (10 May 2019 09:00) (111/45 - 152/63)  ABP(mean): 66 (10 May 2019 09:00) (66 - 98)  SpO2: 100% (10 May 2019 09:00) (99% - 100%) Fi02 50%    Qtts:   Cleviprex 15  esmolol 30  insulin   Propofol    I&O's Summary    09 May 2019 07:01  -  10 May 2019 07:00  --------------------------------------------------------  IN: 772.3 mL / OUT: 835 mL / NET: -62.7 mL    10 May 2019 07:01  -  10 May 2019 09:43  --------------------------------------------------------  IN: 229.4 mL / OUT: 100 mL / NET: 129.4 mL    Vent settings: AC 12/500/50/5    Physical Exam    Heart - regular (-)rub/gallop  Lungs - BS appreciated bilaterally - no rhonchi/wheeze  Abd - (+)BS but decreased; soft NTND (-)r/r/g  Ext - warm to touch no cyanosis/clubbing - palpable pulses appreciated all extremities UE/LE  Chest - op bandage in place  Neuro - pupils sluggish but reactive bilaterally - otherwise unable  Skin - no rash    LABS:                        8.9    11.24 )-----------( 169      ( 10 May 2019 02:55 )             27.5     05-10    142  |  104  |  14  ----------------------------<  134<H>  4.0   |  27  |  1.29    Ca    9.9      10 May 2019 02:55  Phos  3.9     05-10  Mg     2.3     05-10    TPro  6.0  /  Alb  3.7  /  TBili  0.8  /  DBili  x   /  AST  43<H>  /  ALT  30  /  AlkPhos  38<L>  05-10    PT/INR - ( 10 May 2019 02:55 )   PT: 15.3 sec;   INR: 1.34     PTT - ( 10 May 2019 02:55 )  PTT:28.6 sec    ABG - ( 10 May 2019 02:55 ) 7.38/47/152/99    RADIOLOGY & ADDITIONAL STUDIES: reviewed    Patient with emergent aortic dissection repair out of OR Midnight today -     1. CV  maintain tight BP control  on cleviprex adn esmolol with good control -   start IV vasotec now 2.5 q6h with hold parameters  may need additional agents to maintain control  will send RPR and Fibrilin 1 panel based on OR findings per CTS  complete periop Abx prophylaxis  LA normal 1.8    2. Pulm   serial ABG to optimize oxygenation an dventilation  sedation to be turned off and assess for mental status and candidacy for wean/extubate  monitor chest tube output    3. Neuro  wake and assess  f/u RPR and testing for marfans    4. Endocrine  insulin infusion per protocol to maintain glycemic control  /97/105    SCD and GI prophylaxis    d/w staff and CTS    I have spent/provided stated minutes of additional critical care time to this patient: 60 min

## 2019-05-10 NOTE — PROGRESS NOTE ADULT - SUBJECTIVE AND OBJECTIVE BOX
CTICU  CRITICAL  CARE  attending     Hand off received 					   Pertinent clinical, laboratory, radiographic, hemodynamic, echocardiographic, respiratory data, microbiologic data and chart were reviewed and analyzed frequently throughout the course of the day and night  Patient seen and examined with CTS/ SH attending at bedside      This is a 43 year old male with no known medical history who presented to [place] ED on 5/9/19 with acute onset substernal chest pain radiating to back with associated dizziness and b/l upper extremities paresthesia.  VS stable on presentation with moderate HTN, 143/76mmHg, with TYRESE seen on imaging, 16/1.50 and negative troponin.  He underwent CTA C/A/P which demonstrated acute Type A dissection and was transferred to [facility] emergently to undergo surgical repair with Dr. Smith.  Patient admitted to CTICU post-operatively.    Extubated 05/10/19.      FAMILY HISTORY:  No pertinent family history in first degree relatives  PAST MEDICAL & SURGICAL HISTORY:  No pertinent past medical history  No significant past surgical history    Patient is a 43y old  Male who presents with a chief complaint of Type A Aortic Dissection (10 May 2019 09:43)      14 system review was unremarkable  acute changes include acute respiratory failure  Vital signs, hemodynamic and respiratory parameters were reviewed from the bedside nursing flow sheet.  ICU Vital Signs Last 24 Hrs  T(C): 37.2 (10 May 2019 22:25), Max: 37.2 (10 May 2019 22:25)  T(F): 98.9 (10 May 2019 22:25), Max: 98.9 (10 May 2019 22:25)  HR: 78 (11 May 2019 00:00) (52 - 80)  BP: --  BP(mean): --  ABP: 128/46 (11 May 2019 00:00) (106/44 - 150/52)  ABP(mean): 70 (11 May 2019 00:00) (58 - 84)  RR: 20 (11 May 2019 00:00) (10 - 24)  SpO2: 98% (11 May 2019 00:00) (91% - 100%)    Adult Advanced Hemodynamics Last 24 Hrs  CVP(mm Hg): --  CVP(cm H2O): --  CO: --  CI: --  PA: --  PA(mean): --  PCWP: --  SVR: --  SVRI: --  PVR: --  PVRI: --, ABG - ( 10 May 2019 22:28 )  pH, Arterial: 7.41  pH, Blood: x     /  pCO2: 45    /  pO2: 119   / HCO3: 28    / Base Excess: 2.6   /  SaO2: 98                Mode: AC/ CMV (Assist Control/ Continuous Mandatory Ventilation)  RR (machine): 12  TV (machine): 500  FiO2: 50  PEEP: 5  ITime: 1.7  MAP: 8  PIP: 12    Intake and output was reviewed and the fluid balance was calculated  Daily     Daily   I&O's Summary    09 May 2019 07:01  -  10 May 2019 07:00  --------------------------------------------------------  IN: 772.3 mL / OUT: 835 mL / NET: -62.7 mL    10 May 2019 07:01  -  11 May 2019 00:37  --------------------------------------------------------  IN: 801.4 mL / OUT: 925 mL / NET: -123.6 mL        All lines and drain sites were assessed  Glycemic trend was reviewed CAPILLARY BLOOD GLUCOSE      POCT Blood Glucose.: 110 mg/dL (10 May 2019 22:29)    NEURO: No acute change in mental status from the baseline.  CVS: S1, S2, No S3.  RESPIRATORY: Auscultation of the chest reveals equal breath sounds.  GI: Abdomen is soft. No tenderness. + Bowel sounds.  Extremities are warm and well perfused.  VASCULAR: + DP/PT.  Wounds appear clean and unremarkable  Antibiotics are perioperative cefazolin.    labs  CBC Full  -  ( 10 May 2019 22:30 )  WBC Count : 10.89 K/uL  RBC Count : 4.23 M/uL  Hemoglobin : 9.2 g/dL  Hematocrit : 29.2 %  Platelet Count - Automated : 176 K/uL  Mean Cell Volume : 69.0 fl  Mean Cell Hemoglobin : 21.7 pg  Mean Cell Hemoglobin Concentration : 31.5 gm/dL  Auto Neutrophil # : x  Auto Lymphocyte # : x  Auto Monocyte # : x  Auto Eosinophil # : x  Auto Basophil # : x  Auto Neutrophil % : x  Auto Lymphocyte % : x  Auto Monocyte % : x  Auto Eosinophil % : x  Auto Basophil % : x    05-10    139  |  102  |  21  ----------------------------<  116<H>  4.3   |  25  |  1.38<H>    Ca    9.1      10 May 2019 22:30  Phos  5.2     05-10  Mg     2.2     05-10    TPro  6.5  /  Alb  3.5  /  TBili  0.4  /  DBili  x   /  AST  35  /  ALT  23  /  AlkPhos  42  05-10    PT/INR - ( 10 May 2019 22:30 )   PT: 16.2 sec;   INR: 1.42          PTT - ( 10 May 2019 22:30 )  PTT:30.4 sec  The current medications were reviewed   MEDICATIONS  (STANDING):  ceFAZolin  Injectable. 2000 milliGRAM(s) IV Push every 8 hours  chlorhexidine 2% Cloths 1 Application(s) Topical daily  clevidipine Infusion 15 mG/Hr (30 mL/Hr) IV Continuous <Continuous>  dextrose 5%. 1000 milliLiter(s) (50 mL/Hr) IV Continuous <Continuous>  dextrose 50% Injectable 50 milliLiter(s) IV Push every 15 minutes  dextrose 50% Injectable 25 milliLiter(s) IV Push every 15 minutes  docusate sodium 100 milliGRAM(s) Oral three times a day  enalaprilat Injectable 2.5 milliGRAM(s) IV Push every 6 hours  heparin  Injectable 7500 Unit(s) SubCutaneous every 8 hours  HYDROmorphone  Injectable 0.5 milliGRAM(s) IV Push once  insulin lispro (HumaLOG) corrective regimen sliding scale   SubCutaneous Before meals and at bedtime  pantoprazole    Tablet 40 milliGRAM(s) Oral before breakfast  senna 2 Tablet(s) Oral at bedtime  sodium chloride 0.9%. 1000 milliLiter(s) (10 mL/Hr) IV Continuous <Continuous>    MEDICATIONS  (PRN):  acetaminophen   Tablet .. 650 milliGRAM(s) Oral every 6 hours PRN Mild Pain (1 - 3)  dextrose 40% Gel 15 Gram(s) Oral once PRN Blood Glucose LESS THAN 70 milliGRAM(s)/deciliter  glucagon  Injectable 1 milliGRAM(s) IntraMuscular once PRN Glucose LESS THAN 70 milligrams/deciliter  oxyCODONE    5 mG/acetaminophen 325 mG 1 Tablet(s) Oral every 6 hours PRN Moderate Pain (4 - 6)  oxyCODONE    5 mG/acetaminophen 325 mG 2 Tablet(s) Oral every 6 hours PRN Severe Pain (7 - 10)  polyethylene glycol 3350 17 Gram(s) Oral daily PRN Constipation       PROBLEM LIST/ ASSESSMENT:  HEALTH ISSUES - PROBLEM Dx:        Patient is a 43y old  Male who presented to [facility]  with TYRESE, Chest pain and uncontrolled BP.  CTa C/A/P: acute Type A dissection  S/P  emergent repair of aortic dissection.  S/P AORTIC VALVE REPAIR.  S/P TEVAR.  S/P Replacement of ascending aorta and hemiarch.  intraop 6 FFP/2 platelet/10 cryo given; 3L Crystalloids.   Arrived to ICU [place] 5/10  Transiently on Epi - rapidy titrated off and now on Cleviprex and Esmolol    HEMODYNAMICALLY  STABLE.  Borderline urine output.  Good oxygenation.  Overall doing well.        My plan includes :  Afterload reduction.  Beta blocker and statin.   Close hemodynamic, ventilatory and drain monitoring and management.  Monitor for arrhythmias and monitor parameters for organ perfusion  Monitor neurologic status  Head of the bed should remain elevated to 45 deg .   Chest PT and IS will be encouraged  Monitor adequacy of oxygenation and ventilation and attempt to wean oxygen  Nutritional goals will be met using po eventually , ensure adequate caloric intake and monitor  the same  Stress ulcer and VTE prophylaxis will be achieved    Glycemic control is satisfactory  Electrolytes have been repleted as necessary and wound care has been carried out. Pain control has been achieved.   Aggressive  physical therapy and early mobility and ambulation goals will be met   The family was updated about the course and plan  CRITICAL CARE TIME SPENT in evaluation and management, reassessments, review and interpretation of labs and x-rays, ventilator and hemodynamic management, formulating a plan and coordinating care: ___60____ MIN.  Time does not include procedural time.  CTICU ATTENDING     					        Gus Saldivar MD

## 2019-05-10 NOTE — H&P ADULT - HISTORY OF PRESENT ILLNESS
This is a 43 year old male with This is a 43 year old male with no known medical history who presented to [place] ED on 5/9/19 with acute onset substernal chest pain radiating to back with associated dizziness and b/l upper extremities paresthesia.  VS stable on presentation with moderate HTN, 143/76mmHg, with TYRESE seen on imaging, 16/1.50 and negative troponin.  He underwent CTA C/A/P which demonstrated acute Type A dissection and was transferred to [facility] emergently to undergo surgical repair with Dr. Smith.  Patient admitted to CTICU post-operatively.

## 2019-05-10 NOTE — PHYSICAL THERAPY INITIAL EVALUATION ADULT - GENERAL OBSERVATIONS, REHAB EVAL
Patient received supine in bed with + central line, tele, temp PPM, gutiérrez catheter, chest tube x 1, non-rebreather. Patient in no apparent distress.

## 2019-05-10 NOTE — PROGRESS NOTE ADULT - SUBJECTIVE AND OBJECTIVE BOX
CTICU  CRITICAL  CARE  ATTENDING:   				   Pertinent clinical, laboratory, radiographic, hemodynamic, echocardiographic, respiratory data, microbiologic data and chart were reviewed and analyzed frequently throughout the course of the day and night    Patient seen and examined with CTS/ SH attending at bedside    43 year old male with no known medical history who presented to [place] ED on 5/9/19 with acute onset substernal chest pain radiating to back with associated dizziness and b/l upper extremities paresthesia.  VS stable on presentation with moderate HTN, 143/76mmHg, with TYRESE seen on imaging, 16/1.50 and negative troponin.  He underwent CTA C/A/P which demonstrated acute Type A dissection and was transferred to [facility] emergently to undergo surgical repair with Dr. Smith.      Intraop: 6u FFPs, 2u Platelets, 10 Cryo  EBL: 500cc  UO: 1500cc  Crystalloids: 2L       HEALTH ISSUES - PROBLEM Dx:         FAMILY HISTORY:  No pertinent family history in first degree relatives  PAST MEDICAL & SURGICAL HISTORY:  No pertinent past medical history  No significant past surgical history      14 system review was unremarkable  acute changes include acute respiratory failure    Vital signs, hemodynamic and respiratory parameters were reviewed from the bedside nursing flowsheet.  ICU Vital Signs Last 24 Hrs  T(C): 36.1 (10 May 2019 00:06), Max: 36.1 (10 May 2019 00:06)  T(F): 96.9 (10 May 2019 00:06), Max: 96.9 (10 May 2019 00:06)  HR: 74 (10 May 2019 01:15) (60 - 74)  BP: --  BP(mean): --  ABP: 132/58 (10 May 2019 01:15) (111/45 - 152/63)  ABP(mean): 84 (10 May 2019 01:15) (72 - 98)  RR: 13 (10 May 2019 01:15) (10 - 13)  SpO2: 100% (10 May 2019 01:15) (100% - 100%)    Adult Advanced Hemodynamics Last 24 Hrs  CVP(mm Hg): --  CVP(cm H2O): --  CO: --  CI: --  PA: --  PA(mean): --  PCWP: --  SVR: --  SVRI: --  PVR: --  PVRI: --, ABG - ( 10 May 2019 01:29 )  pH, Arterial: 7.39  pH, Blood: x     /  pCO2: 46    /  pO2: 107   / HCO3: 27    / Base Excess: 1.6   /  SaO2: 98                  Intake and output was reviewed and the fluid balance was calculated  Daily     Daily   I&O's Summary    09 May 2019 07:01  -  10 May 2019 01:44  --------------------------------------------------------  IN: 191 mL / OUT: 355 mL / NET: -164 mL        All lines and drain sites were assessed  Glycemic trend was reviewedCAPILLARY BLOOD GLUCOSE      POCT Blood Glucose.: 171 mg/dL (10 May 2019 01:02)        Exam:   Gen: NAD   Neuro: intubated and sedated  Lungs: Auscultation of the chest reveals equal bs  Abd: soft, nt/nd  Ext: warm and well perfused  Wound: appears clean and unremarkable  Antibiotics are periop      labs  CBC Full  -  ( 09 May 2019 19:32 )  WBC Count : 6.80 K/uL  RBC Count : 5.38 M/uL  Hemoglobin : 11.7 g/dL  Hematocrit : 37.4 %  Platelet Count - Automated : 182 K/uL  Mean Cell Volume : 69.5 fl  Mean Cell Hemoglobin : 21.7 pg  Mean Cell Hemoglobin Concentration : 31.3 gm/dL  Auto Neutrophil # : 5.78 K/uL  Auto Lymphocyte # : 0.60 K/uL  Auto Monocyte # : 0.30 K/uL  Auto Eosinophil # : 0.00 K/uL  Auto Basophil # : 0.06 K/uL  Auto Neutrophil % : 78.0 %  Auto Lymphocyte % : 8.8 %  Auto Monocyte % : 4.4 %  Auto Eosinophil % : 0.0 %  Auto Basophil % : 0.9 %    05-10    141  |  102  |  14  ----------------------------<  182<H>  3.6   |  25  |  1.24    Ca    10.3      10 May 2019 00:25  Phos  3.3     05-10  Mg     2.4     05-10    TPro  5.9<L>  /  Alb  3.4  /  TBili  0.6  /  DBili  x   /  AST  38  /  ALT  26  /  AlkPhos  36<L>  05-10    PT/INR - ( 10 May 2019 00:25 )   PT: 17.2 sec;   INR: 1.50          PTT - ( 10 May 2019 00:25 )  PTT:27.7 sec    The current medications were reviewed   MEDICATIONS  (STANDING):  acetaminophen  IVPB .. 1000 milliGRAM(s) IV Intermittent once  chlorhexidine 0.12% Liquid 5 milliLiter(s) Oral Mucosa every 4 hours  chlorhexidine 2% Cloths 1 Application(s) Topical daily  clevidipine Infusion 15 mG/Hr (30 mL/Hr) IV Continuous <Continuous>  dextrose 50% Injectable 50 milliLiter(s) IV Push every 15 minutes  dextrose 50% Injectable 25 milliLiter(s) IV Push every 15 minutes  EPINEPHrine    Infusion 0.02 MICROgram(s)/kG/Min (9 mL/Hr) IV Continuous <Continuous>  esMOLOL  Infusion 50 MICROgram(s)/kG/Min (36 mL/Hr) IV Continuous <Continuous>  heparin  Injectable 7500 Unit(s) SubCutaneous every 8 hours  insulin regular Infusion 1 Unit(s)/Hr (1 mL/Hr) IV Continuous <Continuous>  meperidine     Injectable 25 milliGRAM(s) IV Push once  pantoprazole  Injectable 40 milliGRAM(s) IV Push daily  propofol Infusion 30 MICROgram(s)/kG/Min (21.6 mL/Hr) IV Continuous <Continuous>  sodium chloride 0.9%. 1000 milliLiter(s) (10 mL/Hr) IV Continuous <Continuous>  vasopressin Infusion 0.045 Unit(s)/Min (2.7 mL/Hr) IV Continuous <Continuous>    MEDICATIONS  (PRN):  fentaNYL    Injectable 25 MICROGram(s) IV Push every 4 hours PRN Severe Pain (7 - 10)       PROBLEM LIST/ ASSESSMENT:  HEALTH ISSUES - PROBLEM Dx:         43 year old male with no known medical history who presented to [place] ED on 5/9/19 with acute onset substernal chest pain radiating to back with associated dizziness and b/l upper extremities paresthesia.  VS stable on presentation with moderate HTN, 143/76mmHg, with TYRESE seen on imaging, 16/1.50 and negative troponin.  He underwent CTA C/A/P which demonstrated acute Type A dissection and was transferred to [facility] emergently to undergo surgical repair with Dr. Smith.       My plan includes :  -Close hemodynamic, ventilatory and drain monitoring and management per post op routine  -Monitor for arrhythmias and monitor parameters for organ perfusion  -Monitor neurologic status  -Head of the bed should remain elevated to 45 deg .   -Chest PT and IS will be encouraged  -Monitor adequacy of oxygenation and ventilation and attempt to wean oxygen  -Nutritional goals will be met using po eventually , ensure adequate caloric intake and montior the same  -Stress ulcer and VTE prophylaxis will be achieved    -Glycemic control is satisfactory  -Electrolytes have been repleated as necessary and wound care has been carried out. Pain control has been achieved.   -Agressive physical therapy and early mobility and ambulation goals will be met   The family was updated about the course and plan    CRITICAL CARE TIME SPENT in evaluation and management, reassessments, review and interpretation of labs and x-rays, ventilator and hemodynamic management, formulating a plan and coordinating care: ___60____ MIN.  Time does not include procedural time.      CTICU ATTENDING:      		  Trevin Torres MD CTICU  CRITICAL  CARE  ATTENDING:   				   Pertinent clinical, laboratory, radiographic, hemodynamic, echocardiographic, respiratory data, microbiologic data and chart were reviewed and analyzed frequently throughout the course of the day and night    Patient seen and examined with CTS/ SH attending at bedside    43 year old male with no known medical history who presented to [place] ED on 5/9/19 with acute onset substernal chest pain radiating to back with associated dizziness and b/l upper extremities paresthesia.  VS stable on presentation with moderate HTN, 143/76mmHg, with TYRESE seen on imaging, 16/1.50 and negative troponin.  He underwent CTA C/A/P which demonstrated acute Type A dissection and was transferred to [facility] emergently to undergo surgical repair with Dr. Smith.      Intraop: 6u FFPs, 2u Platelets, 10 Cryo  EBL: 500cc  UO: 1500cc  Crystalloids: 2L       HEALTH ISSUES - PROBLEM Dx:         FAMILY HISTORY:  No pertinent family history in first degree relatives  PAST MEDICAL & SURGICAL HISTORY:  No pertinent past medical history  No significant past surgical history      14 system review was unremarkable  acute changes include acute respiratory failure    Vital signs, hemodynamic and respiratory parameters were reviewed from the bedside nursing flowsheet.  ICU Vital Signs Last 24 Hrs  T(C): 36.1 (10 May 2019 00:06), Max: 36.1 (10 May 2019 00:06)  T(F): 96.9 (10 May 2019 00:06), Max: 96.9 (10 May 2019 00:06)  HR: 74 (10 May 2019 01:15) (60 - 74)  BP: --  BP(mean): --  ABP: 132/58 (10 May 2019 01:15) (111/45 - 152/63)  ABP(mean): 84 (10 May 2019 01:15) (72 - 98)  RR: 13 (10 May 2019 01:15) (10 - 13)  SpO2: 100% (10 May 2019 01:15) (100% - 100%)    Adult Advanced Hemodynamics Last 24 Hrs  CVP(mm Hg): --  CVP(cm H2O): --  CO: --  CI: --  PA: --  PA(mean): --  PCWP: --  SVR: --  SVRI: --  PVR: --  PVRI: --, ABG - ( 10 May 2019 01:29 )  pH, Arterial: 7.39  pH, Blood: x     /  pCO2: 46    /  pO2: 107   / HCO3: 27    / Base Excess: 1.6   /  SaO2: 98                  Intake and output was reviewed and the fluid balance was calculated  Daily     Daily   I&O's Summary    09 May 2019 07:01  -  10 May 2019 01:44  --------------------------------------------------------  IN: 191 mL / OUT: 355 mL / NET: -164 mL        All lines and drain sites were assessed  Glycemic trend was reviewedCAPILLARY BLOOD GLUCOSE      POCT Blood Glucose.: 171 mg/dL (10 May 2019 01:02)        Exam:   Gen: NAD   Neuro: intubated and sedated  Lungs: Auscultation of the chest reveals equal bs  Abd: soft, nt/nd  Ext: warm and well perfused  Wound: appears clean and unremarkable  Antibiotics are periop      labs  CBC Full  -  ( 09 May 2019 19:32 )  WBC Count : 6.80 K/uL  RBC Count : 5.38 M/uL  Hemoglobin : 11.7 g/dL  Hematocrit : 37.4 %  Platelet Count - Automated : 182 K/uL  Mean Cell Volume : 69.5 fl  Mean Cell Hemoglobin : 21.7 pg  Mean Cell Hemoglobin Concentration : 31.3 gm/dL  Auto Neutrophil # : 5.78 K/uL  Auto Lymphocyte # : 0.60 K/uL  Auto Monocyte # : 0.30 K/uL  Auto Eosinophil # : 0.00 K/uL  Auto Basophil # : 0.06 K/uL  Auto Neutrophil % : 78.0 %  Auto Lymphocyte % : 8.8 %  Auto Monocyte % : 4.4 %  Auto Eosinophil % : 0.0 %  Auto Basophil % : 0.9 %    05-10    141  |  102  |  14  ----------------------------<  182<H>  3.6   |  25  |  1.24    Ca    10.3      10 May 2019 00:25  Phos  3.3     05-10  Mg     2.4     05-10    TPro  5.9<L>  /  Alb  3.4  /  TBili  0.6  /  DBili  x   /  AST  38  /  ALT  26  /  AlkPhos  36<L>  05-10    PT/INR - ( 10 May 2019 00:25 )   PT: 17.2 sec;   INR: 1.50          PTT - ( 10 May 2019 00:25 )  PTT:27.7 sec    The current medications were reviewed   MEDICATIONS  (STANDING):  acetaminophen  IVPB .. 1000 milliGRAM(s) IV Intermittent once  chlorhexidine 0.12% Liquid 5 milliLiter(s) Oral Mucosa every 4 hours  chlorhexidine 2% Cloths 1 Application(s) Topical daily  clevidipine Infusion 15 mG/Hr (30 mL/Hr) IV Continuous <Continuous>  dextrose 50% Injectable 50 milliLiter(s) IV Push every 15 minutes  dextrose 50% Injectable 25 milliLiter(s) IV Push every 15 minutes  EPINEPHrine    Infusion 0.02 MICROgram(s)/kG/Min (9 mL/Hr) IV Continuous <Continuous>  esMOLOL  Infusion 50 MICROgram(s)/kG/Min (36 mL/Hr) IV Continuous <Continuous>  heparin  Injectable 7500 Unit(s) SubCutaneous every 8 hours  insulin regular Infusion 1 Unit(s)/Hr (1 mL/Hr) IV Continuous <Continuous>  meperidine     Injectable 25 milliGRAM(s) IV Push once  pantoprazole  Injectable 40 milliGRAM(s) IV Push daily  propofol Infusion 30 MICROgram(s)/kG/Min (21.6 mL/Hr) IV Continuous <Continuous>  sodium chloride 0.9%. 1000 milliLiter(s) (10 mL/Hr) IV Continuous <Continuous>  vasopressin Infusion 0.045 Unit(s)/Min (2.7 mL/Hr) IV Continuous <Continuous>    MEDICATIONS  (PRN):  fentaNYL    Injectable 25 MICROGram(s) IV Push every 4 hours PRN Severe Pain (7 - 10)       PROBLEM LIST/ ASSESSMENT:  HEALTH ISSUES - PROBLEM Dx:         43 year old male with no known medical history who presented to [place] ED on 5/9/19 with acute onset substernal chest pain radiating to back with associated dizziness and b/l upper extremities paresthesia.  VS stable on presentation with moderate HTN, 143/76mmHg, with TYRESE seen on imaging, 16/1.50 and negative troponin.  He underwent CTA C/A/P which demonstrated acute Type A dissection and was transferred to [facility] emergently to undergo surgical repair with Dr. Smith.       My plan includes :  -Close hemodynamic, ventilatory and drain monitoring and management per post op routine  -Monitor for arrhythmias and monitor parameters for organ perfusion  -Monitor neurologic status  -Head of the bed should remain elevated to 45 deg .   -Chest PT and IS will be encouraged  -Monitor adequacy of oxygenation and ventilation and attempt to wean oxygen  -Nutritional goals will be met using po eventually , ensure adequate caloric intake and montior the same  -Stress ulcer and VTE prophylaxis will be achieved    -Glycemic control is satisfactory  -Electrolytes have been repleated as necessary and wound care has been carried out. Pain control has been achieved.   -Agressive physical therapy and early mobility and ambulation goals will be met   The family was updated about the course and plan    CRITICAL CARE TIME SPENT in evaluation and management, reassessments, review and interpretation of labs and x-rays, ventilator and hemodynamic management, formulating a plan and coordinating care: ___90____ MIN.  Time does not include procedural time.      CTICU ATTENDING:      		  Trevin Torres MD

## 2019-05-10 NOTE — PHYSICAL THERAPY INITIAL EVALUATION ADULT - PERTINENT HX OF CURRENT PROBLEM, REHAB EVAL
This is a 43 year old male with no known medical history who presented to [place] ED on 5/9/19 with acute onset substernal chest pain radiating to back with associated dizziness and b/l upper extremities paresthesia. Transferred to [facility], now s/p above procedure.

## 2019-05-11 LAB
ALBUMIN SERPL ELPH-MCNC: 3.2 G/DL — LOW (ref 3.3–5)
ALBUMIN SERPL ELPH-MCNC: 3.4 G/DL — SIGNIFICANT CHANGE UP (ref 3.3–5)
ALP SERPL-CCNC: 37 U/L — LOW (ref 40–120)
ALP SERPL-CCNC: 57 U/L — SIGNIFICANT CHANGE UP (ref 40–120)
ALT FLD-CCNC: 19 U/L — SIGNIFICANT CHANGE UP (ref 10–45)
ALT FLD-CCNC: 21 U/L — SIGNIFICANT CHANGE UP (ref 10–45)
ANION GAP SERPL CALC-SCNC: 11 MMOL/L — SIGNIFICANT CHANGE UP (ref 5–17)
ANION GAP SERPL CALC-SCNC: 9 MMOL/L — SIGNIFICANT CHANGE UP (ref 5–17)
APTT BLD: 30.4 SEC — SIGNIFICANT CHANGE UP (ref 27.5–36.3)
AST SERPL-CCNC: 34 U/L — SIGNIFICANT CHANGE UP (ref 10–40)
AST SERPL-CCNC: 35 U/L — SIGNIFICANT CHANGE UP (ref 10–40)
BILIRUB SERPL-MCNC: 0.4 MG/DL — SIGNIFICANT CHANGE UP (ref 0.2–1.2)
BILIRUB SERPL-MCNC: 0.5 MG/DL — SIGNIFICANT CHANGE UP (ref 0.2–1.2)
BUN SERPL-MCNC: 19 MG/DL — SIGNIFICANT CHANGE UP (ref 7–23)
BUN SERPL-MCNC: 23 MG/DL — SIGNIFICANT CHANGE UP (ref 7–23)
CALCIUM SERPL-MCNC: 8.7 MG/DL — SIGNIFICANT CHANGE UP (ref 8.4–10.5)
CALCIUM SERPL-MCNC: 9 MG/DL — SIGNIFICANT CHANGE UP (ref 8.4–10.5)
CHLORIDE SERPL-SCNC: 102 MMOL/L — SIGNIFICANT CHANGE UP (ref 96–108)
CHLORIDE SERPL-SCNC: 104 MMOL/L — SIGNIFICANT CHANGE UP (ref 96–108)
CO2 SERPL-SCNC: 26 MMOL/L — SIGNIFICANT CHANGE UP (ref 22–31)
CO2 SERPL-SCNC: 27 MMOL/L — SIGNIFICANT CHANGE UP (ref 22–31)
CREAT SERPL-MCNC: 1.26 MG/DL — SIGNIFICANT CHANGE UP (ref 0.5–1.3)
CREAT SERPL-MCNC: 1.31 MG/DL — HIGH (ref 0.5–1.3)
GAS PNL BLDA: SIGNIFICANT CHANGE UP
GAS PNL BLDA: SIGNIFICANT CHANGE UP
GLUCOSE BLDC GLUCOMTR-MCNC: 105 MG/DL — HIGH (ref 70–99)
GLUCOSE BLDC GLUCOMTR-MCNC: 108 MG/DL — HIGH (ref 70–99)
GLUCOSE BLDC GLUCOMTR-MCNC: 115 MG/DL — HIGH (ref 70–99)
GLUCOSE BLDC GLUCOMTR-MCNC: 116 MG/DL — HIGH (ref 70–99)
GLUCOSE SERPL-MCNC: 112 MG/DL — HIGH (ref 70–99)
GLUCOSE SERPL-MCNC: 115 MG/DL — HIGH (ref 70–99)
HCT VFR BLD CALC: 27.3 % — LOW (ref 39–50)
HCT VFR BLD CALC: 28.6 % — LOW (ref 39–50)
HGB BLD-MCNC: 8.9 G/DL — LOW (ref 13–17)
HGB BLD-MCNC: 9.2 G/DL — LOW (ref 13–17)
INR BLD: 1.49 — HIGH (ref 0.88–1.16)
LACTATE SERPL-SCNC: 0.9 MMOL/L — SIGNIFICANT CHANGE UP (ref 0.5–2)
MAGNESIUM SERPL-MCNC: 2.2 MG/DL — SIGNIFICANT CHANGE UP (ref 1.6–2.6)
MAGNESIUM SERPL-MCNC: 2.5 MG/DL — SIGNIFICANT CHANGE UP (ref 1.6–2.6)
MCHC RBC-ENTMCNC: 22.1 PG — LOW (ref 27–34)
MCHC RBC-ENTMCNC: 22.5 PG — LOW (ref 27–34)
MCHC RBC-ENTMCNC: 32.2 GM/DL — SIGNIFICANT CHANGE UP (ref 32–36)
MCHC RBC-ENTMCNC: 32.6 GM/DL — SIGNIFICANT CHANGE UP (ref 32–36)
MCV RBC AUTO: 68.6 FL — LOW (ref 80–100)
MCV RBC AUTO: 68.9 FL — LOW (ref 80–100)
NRBC # BLD: 0 /100 WBCS — SIGNIFICANT CHANGE UP (ref 0–0)
NRBC # BLD: 0 /100 WBCS — SIGNIFICANT CHANGE UP (ref 0–0)
PHOSPHATE SERPL-MCNC: 3.1 MG/DL — SIGNIFICANT CHANGE UP (ref 2.5–4.5)
PHOSPHATE SERPL-MCNC: 4.3 MG/DL — SIGNIFICANT CHANGE UP (ref 2.5–4.5)
PLATELET # BLD AUTO: 169 K/UL — SIGNIFICANT CHANGE UP (ref 150–400)
PLATELET # BLD AUTO: 181 K/UL — SIGNIFICANT CHANGE UP (ref 150–400)
POTASSIUM SERPL-MCNC: 4.3 MMOL/L — SIGNIFICANT CHANGE UP (ref 3.5–5.3)
POTASSIUM SERPL-MCNC: 4.5 MMOL/L — SIGNIFICANT CHANGE UP (ref 3.5–5.3)
POTASSIUM SERPL-SCNC: 4.3 MMOL/L — SIGNIFICANT CHANGE UP (ref 3.5–5.3)
POTASSIUM SERPL-SCNC: 4.5 MMOL/L — SIGNIFICANT CHANGE UP (ref 3.5–5.3)
PROT SERPL-MCNC: 6.2 G/DL — SIGNIFICANT CHANGE UP (ref 6–8.3)
PROT SERPL-MCNC: 7 G/DL — SIGNIFICANT CHANGE UP (ref 6–8.3)
PROTHROM AB SERPL-ACNC: 17.1 SEC — HIGH (ref 10–12.9)
RBC # BLD: 3.96 M/UL — LOW (ref 4.2–5.8)
RBC # BLD: 4.17 M/UL — LOW (ref 4.2–5.8)
RBC # FLD: 17.5 % — HIGH (ref 10.3–14.5)
RBC # FLD: 17.6 % — HIGH (ref 10.3–14.5)
SODIUM SERPL-SCNC: 139 MMOL/L — SIGNIFICANT CHANGE UP (ref 135–145)
SODIUM SERPL-SCNC: 140 MMOL/L — SIGNIFICANT CHANGE UP (ref 135–145)
WBC # BLD: 10.59 K/UL — HIGH (ref 3.8–10.5)
WBC # BLD: 12.14 K/UL — HIGH (ref 3.8–10.5)
WBC # FLD AUTO: 10.59 K/UL — HIGH (ref 3.8–10.5)
WBC # FLD AUTO: 12.14 K/UL — HIGH (ref 3.8–10.5)

## 2019-05-11 PROCEDURE — 99291 CRITICAL CARE FIRST HOUR: CPT

## 2019-05-11 PROCEDURE — 99292 CRITICAL CARE ADDL 30 MIN: CPT

## 2019-05-11 PROCEDURE — 71045 X-RAY EXAM CHEST 1 VIEW: CPT | Mod: 26

## 2019-05-11 RX ORDER — POTASSIUM CHLORIDE 20 MEQ
10 PACKET (EA) ORAL ONCE
Refills: 0 | Status: COMPLETED | OUTPATIENT
Start: 2019-05-11 | End: 2019-05-11

## 2019-05-11 RX ORDER — ACETAMINOPHEN 500 MG
1000 TABLET ORAL ONCE
Refills: 0 | Status: COMPLETED | OUTPATIENT
Start: 2019-05-11 | End: 2019-05-11

## 2019-05-11 RX ORDER — KETOROLAC TROMETHAMINE 30 MG/ML
30 SYRINGE (ML) INJECTION ONCE
Refills: 0 | Status: DISCONTINUED | OUTPATIENT
Start: 2019-05-11 | End: 2019-05-11

## 2019-05-11 RX ORDER — FUROSEMIDE 40 MG
20 TABLET ORAL ONCE
Refills: 0 | Status: COMPLETED | OUTPATIENT
Start: 2019-05-11 | End: 2019-05-11

## 2019-05-11 RX ORDER — METOPROLOL TARTRATE 50 MG
25 TABLET ORAL
Refills: 0 | Status: DISCONTINUED | OUTPATIENT
Start: 2019-05-11 | End: 2019-05-12

## 2019-05-11 RX ORDER — HYDROMORPHONE HYDROCHLORIDE 2 MG/ML
0.5 INJECTION INTRAMUSCULAR; INTRAVENOUS; SUBCUTANEOUS ONCE
Refills: 0 | Status: DISCONTINUED | OUTPATIENT
Start: 2019-05-11 | End: 2019-05-11

## 2019-05-11 RX ADMIN — HYDROMORPHONE HYDROCHLORIDE 0.5 MILLIGRAM(S): 2 INJECTION INTRAMUSCULAR; INTRAVENOUS; SUBCUTANEOUS at 00:30

## 2019-05-11 RX ADMIN — Medication 100 MILLIGRAM(S): at 16:52

## 2019-05-11 RX ADMIN — Medication 650 MILLIGRAM(S): at 17:13

## 2019-05-11 RX ADMIN — Medication 100 MILLIGRAM(S): at 22:04

## 2019-05-11 RX ADMIN — Medication 10 MILLIEQUIVALENT(S): at 16:52

## 2019-05-11 RX ADMIN — Medication 2000 MILLIGRAM(S): at 06:55

## 2019-05-11 RX ADMIN — HEPARIN SODIUM 7500 UNIT(S): 5000 INJECTION INTRAVENOUS; SUBCUTANEOUS at 14:49

## 2019-05-11 RX ADMIN — Medication 30 MILLIGRAM(S): at 10:51

## 2019-05-11 RX ADMIN — CHLORHEXIDINE GLUCONATE 1 APPLICATION(S): 213 SOLUTION TOPICAL at 16:53

## 2019-05-11 RX ADMIN — Medication 1000 MILLIGRAM(S): at 23:00

## 2019-05-11 RX ADMIN — Medication 20 MILLIGRAM(S): at 16:50

## 2019-05-11 RX ADMIN — Medication 20 MILLIGRAM(S): at 08:50

## 2019-05-11 RX ADMIN — Medication 30 MILLIGRAM(S): at 10:30

## 2019-05-11 RX ADMIN — Medication 100 MILLIGRAM(S): at 06:55

## 2019-05-11 RX ADMIN — OXYCODONE AND ACETAMINOPHEN 2 TABLET(S): 5; 325 TABLET ORAL at 04:40

## 2019-05-11 RX ADMIN — Medication 2.5 MILLIGRAM(S): at 00:08

## 2019-05-11 RX ADMIN — Medication 650 MILLIGRAM(S): at 22:06

## 2019-05-11 RX ADMIN — HYDROMORPHONE HYDROCHLORIDE 0.5 MILLIGRAM(S): 2 INJECTION INTRAMUSCULAR; INTRAVENOUS; SUBCUTANEOUS at 01:00

## 2019-05-11 RX ADMIN — PANTOPRAZOLE SODIUM 40 MILLIGRAM(S): 20 TABLET, DELAYED RELEASE ORAL at 07:06

## 2019-05-11 RX ADMIN — Medication 2.5 MILLIGRAM(S): at 06:56

## 2019-05-11 RX ADMIN — Medication 25 MILLIGRAM(S): at 08:57

## 2019-05-11 RX ADMIN — Medication 25 MILLIGRAM(S): at 17:10

## 2019-05-11 RX ADMIN — HEPARIN SODIUM 7500 UNIT(S): 5000 INJECTION INTRAVENOUS; SUBCUTANEOUS at 22:05

## 2019-05-11 RX ADMIN — OXYCODONE AND ACETAMINOPHEN 2 TABLET(S): 5; 325 TABLET ORAL at 03:59

## 2019-05-11 RX ADMIN — SENNA PLUS 2 TABLET(S): 8.6 TABLET ORAL at 22:05

## 2019-05-11 RX ADMIN — Medication 2000 MILLIGRAM(S): at 14:51

## 2019-05-11 RX ADMIN — HEPARIN SODIUM 7500 UNIT(S): 5000 INJECTION INTRAVENOUS; SUBCUTANEOUS at 06:55

## 2019-05-11 RX ADMIN — Medication 400 MILLIGRAM(S): at 22:04

## 2019-05-11 NOTE — CHART NOTE - NSCHARTNOTEFT_GEN_A_CORE
CT Removal:    Pt seen and examined at bedside.  Case discussed with Dr. Isidro.  Minimal output from CTs.  Right angle mediastinal CT removed without incident per Dr. Isidro request.  Occlusive DSD placed.  Pt tolerated procedure well.  No CXR ordered d/t it being a mediastinal CT

## 2019-05-11 NOTE — PROGRESS NOTE ADULT - SUBJECTIVE AND OBJECTIVE BOX
CTICU  CRITICAL  CARE  attending     Hand off received 					   Pertinent clinical, laboratory, radiographic, hemodynamic, echocardiographic, respiratory data, microbiologic data and chart were reviewed and analyzed frequently throughout the course of the day and night  Patient seen and examined with CTS/ SH attending at bedside    This is a 43 year old male with no known medical history who presented to [place] ED on 5/9/19 with acute onset substernal chest pain radiating to back with associated dizziness and b/l upper extremities paresthesia.  VS stable on presentation with moderate HTN, 143/76mmHg, with TYRESE seen on imaging, 16/1.50 and negative troponin.  He underwent CTA C/A/P which demonstrated acute Type A dissection and was transferred to [facility] emergently to undergo surgical repair with Dr. Smith.  Patient admitted to CTICU post-operatively.    Extubated 05/10/19.        FAMILY HISTORY:  No pertinent family history in first degree relatives  PAST MEDICAL & SURGICAL HISTORY:  No pertinent past medical history  No significant past surgical history    Patient is a 43y old  Male who presents with a chief complaint of Type A Aortic Dissection (11 May 2019 15:21)      14 system review was unremarkable  acute changes include acute respiratory failure  Vital signs, hemodynamic and respiratory parameters were reviewed from the bedside nursing flow sheet.  ICU Vital Signs Last 24 Hrs  T(C): 37.7 (11 May 2019 22:05), Max: 37.7 (11 May 2019 22:05)  T(F): 99.8 (11 May 2019 22:05), Max: 99.8 (11 May 2019 22:05)  HR: 84 (11 May 2019 20:00) (68 - 86)  BP: 119/57 (11 May 2019 18:30) (119/57 - 131/64)  BP(mean): 75 (11 May 2019 18:30) (75 - 88)  ABP: 118/52 (11 May 2019 20:00) (114/52 - 154/59)  ABP(mean): 76 (11 May 2019 20:00) (66 - 90)  RR: 22 (11 May 2019 20:00) (15 - 24)  SpO2: 100% (11 May 2019 20:00) (87% - 100%)    Adult Advanced Hemodynamics Last 24 Hrs  CVP(mm Hg): --  CVP(cm H2O): --  CO: --  CI: --  PA: --  PA(mean): --  PCWP: --  SVR: --  SVRI: --  PVR: --  PVRI: --, ABG - ( 11 May 2019 19:49 )  pH, Arterial: 7.46  pH, Blood: x     /  pCO2: 41    /  pO2: 106   / HCO3: 29    / Base Excess: 4.5   /  SaO2: 98                  Intake and output was reviewed and the fluid balance was calculated  Daily     Daily   I&O's Summary    10 May 2019 07:01  -  11 May 2019 07:00  --------------------------------------------------------  IN: 1111.4 mL / OUT: 1285 mL / NET: -173.6 mL    11 May 2019 07:01  -  11 May 2019 22:14  --------------------------------------------------------  IN: 1305 mL / OUT: 1480 mL / NET: -175 mL        All lines and drain sites were assessed  Glycemic trend was reviewed CAPILLARY BLOOD GLUCOSE      POCT Blood Glucose.: 108 mg/dL (11 May 2019 21:38)    NEURO: No acute change in mental status from the baseline.  CVS: S1, S2, No S3.  RESPIRATORY: Auscultation of the chest reveals equal breath sounds.  GI: Abdomen is soft. No tenderness. + Bowel sounds.  Extremities are warm and well perfused.  VASCULAR: + DP/PT.  Wounds appear clean and unremarkable.        labs  CBC Full  -  ( 11 May 2019 15:57 )  WBC Count : 12.14 K/uL  RBC Count : 4.17 M/uL  Hemoglobin : 9.2 g/dL  Hematocrit : 28.6 %  Platelet Count - Automated : 181 K/uL  Mean Cell Volume : 68.6 fl  Mean Cell Hemoglobin : 22.1 pg  Mean Cell Hemoglobin Concentration : 32.2 gm/dL  Auto Neutrophil # : x  Auto Lymphocyte # : x  Auto Monocyte # : x  Auto Eosinophil # : x  Auto Basophil # : x  Auto Neutrophil % : x  Auto Lymphocyte % : x  Auto Monocyte % : x  Auto Eosinophil % : x  Auto Basophil % : x    05-11    139  |  102  |  23  ----------------------------<  112<H>  4.5   |  26  |  1.31<H>    Ca    9.0      11 May 2019 15:57  Phos  3.1     05-11  Mg     2.5     05-11    TPro  7.0  /  Alb  3.4  /  TBili  0.5  /  DBili  x   /  AST  35  /  ALT  19  /  AlkPhos  57  05-11    PT/INR - ( 11 May 2019 03:29 )   PT: 17.1 sec;   INR: 1.49          PTT - ( 11 May 2019 03:29 )  PTT:30.4 sec  The current medications were reviewed   MEDICATIONS  (STANDING):  chlorhexidine 2% Cloths 1 Application(s) Topical daily  clevidipine Infusion 15 mG/Hr (30 mL/Hr) IV Continuous <Continuous>  dextrose 5%. 1000 milliLiter(s) (50 mL/Hr) IV Continuous <Continuous>  dextrose 50% Injectable 50 milliLiter(s) IV Push every 15 minutes  dextrose 50% Injectable 25 milliLiter(s) IV Push every 15 minutes  docusate sodium 100 milliGRAM(s) Oral three times a day  heparin  Injectable 7500 Unit(s) SubCutaneous every 8 hours  insulin lispro (HumaLOG) corrective regimen sliding scale   SubCutaneous Before meals and at bedtime  metoprolol tartrate 25 milliGRAM(s) Oral <User Schedule>  pantoprazole    Tablet 40 milliGRAM(s) Oral before breakfast  senna 2 Tablet(s) Oral at bedtime  sodium chloride 0.9%. 1000 milliLiter(s) (10 mL/Hr) IV Continuous <Continuous>    MEDICATIONS  (PRN):  dextrose 40% Gel 15 Gram(s) Oral once PRN Blood Glucose LESS THAN 70 milliGRAM(s)/deciliter  glucagon  Injectable 1 milliGRAM(s) IntraMuscular once PRN Glucose LESS THAN 70 milligrams/deciliter  oxyCODONE    5 mG/acetaminophen 325 mG 1 Tablet(s) Oral every 6 hours PRN Moderate Pain (4 - 6)  oxyCODONE    5 mG/acetaminophen 325 mG 2 Tablet(s) Oral every 6 hours PRN Severe Pain (7 - 10)  polyethylene glycol 3350 17 Gram(s) Oral daily PRN Constipation      PROBLEM LIST/ ASSESSMENT:  HEALTH ISSUES - PROBLEM Dx:      Patient is a 43y old  Male who presented to [facility]  with TYRESE, Chest pain and uncontrolled BP.  CTa C/A/P: acute Type A dissection  S/P  emergent repair of aortic dissection.  S/P AORTIC VALVE REPAIR.  S/P TEVAR.  S/P Replacement of ascending aorta and hemiarch.  HEMODYNAMICALLY STABLE.  Diuresing well.  Good oxygenation.  Adequate PO intake.  Overall satisfactory progress.        My plan includes :  PO BETA BLOCKER Rx.  Close hemodynamic, ventilatory and drain monitoring and management.  Monitor for arrhythmias and monitor parameters for organ perfusion  Monitor neurologic status  Head of the bed should remain elevated to 45 deg .   Chest PT and IS will be encouraged  Monitor adequacy of oxygenation and ventilation and attempt to wean oxygen  Nutritional goals will be met using po eventually , ensure adequate caloric intake and monitor  the same  Stress ulcer and VTE prophylaxis will be achieved    Glycemic control is satisfactory  Electrolytes have been repleted as necessary and wound care has been carried out. Pain control has been achieved.   Aggressive  physical therapy and early mobility and ambulation goals will be met   The family was updated about the course and plan  CRITICAL CARE TIME SPENT in evaluation and management, reassessments, review and interpretation of labs and x-rays, ventilator and hemodynamic management, formulating a plan and coordinating care: ___30____ MIN.  Time does not include procedural time.  CTICU ATTENDING     					      Gus Saldivar MD

## 2019-05-12 LAB
ALBUMIN SERPL ELPH-MCNC: 2.9 G/DL — LOW (ref 3.3–5)
ALBUMIN SERPL ELPH-MCNC: 3.2 G/DL — LOW (ref 3.3–5)
ALBUMIN SERPL ELPH-MCNC: 3.3 G/DL — SIGNIFICANT CHANGE UP (ref 3.3–5)
ALP SERPL-CCNC: 57 U/L — SIGNIFICANT CHANGE UP (ref 40–120)
ALP SERPL-CCNC: 63 U/L — SIGNIFICANT CHANGE UP (ref 40–120)
ALP SERPL-CCNC: 63 U/L — SIGNIFICANT CHANGE UP (ref 40–120)
ALT FLD-CCNC: 28 U/L — SIGNIFICANT CHANGE UP (ref 10–45)
ALT FLD-CCNC: 31 U/L — SIGNIFICANT CHANGE UP (ref 10–45)
ALT FLD-CCNC: 33 U/L — SIGNIFICANT CHANGE UP (ref 10–45)
ANION GAP SERPL CALC-SCNC: 11 MMOL/L — SIGNIFICANT CHANGE UP (ref 5–17)
ANION GAP SERPL CALC-SCNC: 11 MMOL/L — SIGNIFICANT CHANGE UP (ref 5–17)
ANION GAP SERPL CALC-SCNC: 12 MMOL/L — SIGNIFICANT CHANGE UP (ref 5–17)
APTT BLD: 35.1 SEC — SIGNIFICANT CHANGE UP (ref 27.5–36.3)
APTT BLD: 36.5 SEC — HIGH (ref 27.5–36.3)
AST SERPL-CCNC: 34 U/L — SIGNIFICANT CHANGE UP (ref 10–40)
AST SERPL-CCNC: 48 U/L — HIGH (ref 10–40)
AST SERPL-CCNC: 50 U/L — HIGH (ref 10–40)
BILIRUB SERPL-MCNC: 0.4 MG/DL — SIGNIFICANT CHANGE UP (ref 0.2–1.2)
BILIRUB SERPL-MCNC: 0.5 MG/DL — SIGNIFICANT CHANGE UP (ref 0.2–1.2)
BILIRUB SERPL-MCNC: 0.7 MG/DL — SIGNIFICANT CHANGE UP (ref 0.2–1.2)
BUN SERPL-MCNC: 19 MG/DL — SIGNIFICANT CHANGE UP (ref 7–23)
BUN SERPL-MCNC: 20 MG/DL — SIGNIFICANT CHANGE UP (ref 7–23)
BUN SERPL-MCNC: 23 MG/DL — SIGNIFICANT CHANGE UP (ref 7–23)
CALCIUM SERPL-MCNC: 8.9 MG/DL — SIGNIFICANT CHANGE UP (ref 8.4–10.5)
CALCIUM SERPL-MCNC: 9 MG/DL — SIGNIFICANT CHANGE UP (ref 8.4–10.5)
CALCIUM SERPL-MCNC: 9.3 MG/DL — SIGNIFICANT CHANGE UP (ref 8.4–10.5)
CHLORIDE SERPL-SCNC: 100 MMOL/L — SIGNIFICANT CHANGE UP (ref 96–108)
CHLORIDE SERPL-SCNC: 102 MMOL/L — SIGNIFICANT CHANGE UP (ref 96–108)
CHLORIDE SERPL-SCNC: 99 MMOL/L — SIGNIFICANT CHANGE UP (ref 96–108)
CO2 SERPL-SCNC: 26 MMOL/L — SIGNIFICANT CHANGE UP (ref 22–31)
CO2 SERPL-SCNC: 27 MMOL/L — SIGNIFICANT CHANGE UP (ref 22–31)
CO2 SERPL-SCNC: 29 MMOL/L — SIGNIFICANT CHANGE UP (ref 22–31)
CREAT SERPL-MCNC: 0.96 MG/DL — SIGNIFICANT CHANGE UP (ref 0.5–1.3)
CREAT SERPL-MCNC: 1.05 MG/DL — SIGNIFICANT CHANGE UP (ref 0.5–1.3)
CREAT SERPL-MCNC: 1.13 MG/DL — SIGNIFICANT CHANGE UP (ref 0.5–1.3)
GAS PNL BLDA: SIGNIFICANT CHANGE UP
GAS PNL BLDA: SIGNIFICANT CHANGE UP
GAS PNL BLDV: SIGNIFICANT CHANGE UP
GLUCOSE BLDC GLUCOMTR-MCNC: 105 MG/DL — HIGH (ref 70–99)
GLUCOSE BLDC GLUCOMTR-MCNC: 109 MG/DL — HIGH (ref 70–99)
GLUCOSE BLDC GLUCOMTR-MCNC: 111 MG/DL — HIGH (ref 70–99)
GLUCOSE BLDC GLUCOMTR-MCNC: 157 MG/DL — HIGH (ref 70–99)
GLUCOSE SERPL-MCNC: 112 MG/DL — HIGH (ref 70–99)
GLUCOSE SERPL-MCNC: 115 MG/DL — HIGH (ref 70–99)
GLUCOSE SERPL-MCNC: 158 MG/DL — HIGH (ref 70–99)
HCT VFR BLD CALC: 25.9 % — LOW (ref 39–50)
HCT VFR BLD CALC: 26.7 % — LOW (ref 39–50)
HCT VFR BLD CALC: 27.9 % — LOW (ref 39–50)
HGB BLD-MCNC: 8.2 G/DL — LOW (ref 13–17)
HGB BLD-MCNC: 8.5 G/DL — LOW (ref 13–17)
HGB BLD-MCNC: 9 G/DL — LOW (ref 13–17)
INR BLD: 1.39 — HIGH (ref 0.88–1.16)
INR BLD: 1.44 — HIGH (ref 0.88–1.16)
LACTATE SERPL-SCNC: 0.8 MMOL/L — SIGNIFICANT CHANGE UP (ref 0.5–2)
MAGNESIUM SERPL-MCNC: 2.3 MG/DL — SIGNIFICANT CHANGE UP (ref 1.6–2.6)
MAGNESIUM SERPL-MCNC: 2.4 MG/DL — SIGNIFICANT CHANGE UP (ref 1.6–2.6)
MAGNESIUM SERPL-MCNC: 2.6 MG/DL — SIGNIFICANT CHANGE UP (ref 1.6–2.6)
MCHC RBC-ENTMCNC: 21.8 PG — LOW (ref 27–34)
MCHC RBC-ENTMCNC: 22 PG — LOW (ref 27–34)
MCHC RBC-ENTMCNC: 22.3 PG — LOW (ref 27–34)
MCHC RBC-ENTMCNC: 31.7 GM/DL — LOW (ref 32–36)
MCHC RBC-ENTMCNC: 31.8 GM/DL — LOW (ref 32–36)
MCHC RBC-ENTMCNC: 32.3 GM/DL — SIGNIFICANT CHANGE UP (ref 32–36)
MCV RBC AUTO: 68.7 FL — LOW (ref 80–100)
MCV RBC AUTO: 69.1 FL — LOW (ref 80–100)
MCV RBC AUTO: 69.2 FL — LOW (ref 80–100)
NRBC # BLD: 0 /100 WBCS — SIGNIFICANT CHANGE UP (ref 0–0)
PHOSPHATE SERPL-MCNC: 2.4 MG/DL — LOW (ref 2.5–4.5)
PHOSPHATE SERPL-MCNC: 2.4 MG/DL — LOW (ref 2.5–4.5)
PHOSPHATE SERPL-MCNC: 2.9 MG/DL — SIGNIFICANT CHANGE UP (ref 2.5–4.5)
PLATELET # BLD AUTO: 162 K/UL — SIGNIFICANT CHANGE UP (ref 150–400)
PLATELET # BLD AUTO: 189 K/UL — SIGNIFICANT CHANGE UP (ref 150–400)
PLATELET # BLD AUTO: 201 K/UL — SIGNIFICANT CHANGE UP (ref 150–400)
POTASSIUM SERPL-MCNC: 4.1 MMOL/L — SIGNIFICANT CHANGE UP (ref 3.5–5.3)
POTASSIUM SERPL-MCNC: 4.2 MMOL/L — SIGNIFICANT CHANGE UP (ref 3.5–5.3)
POTASSIUM SERPL-MCNC: 4.3 MMOL/L — SIGNIFICANT CHANGE UP (ref 3.5–5.3)
POTASSIUM SERPL-SCNC: 4.1 MMOL/L — SIGNIFICANT CHANGE UP (ref 3.5–5.3)
POTASSIUM SERPL-SCNC: 4.2 MMOL/L — SIGNIFICANT CHANGE UP (ref 3.5–5.3)
POTASSIUM SERPL-SCNC: 4.3 MMOL/L — SIGNIFICANT CHANGE UP (ref 3.5–5.3)
PROT SERPL-MCNC: 6.5 G/DL — SIGNIFICANT CHANGE UP (ref 6–8.3)
PROT SERPL-MCNC: 6.7 G/DL — SIGNIFICANT CHANGE UP (ref 6–8.3)
PROT SERPL-MCNC: 7.3 G/DL — SIGNIFICANT CHANGE UP (ref 6–8.3)
PROTHROM AB SERPL-ACNC: 15.9 SEC — HIGH (ref 10–12.9)
PROTHROM AB SERPL-ACNC: 16.4 SEC — HIGH (ref 10–12.9)
RBC # BLD: 3.77 M/UL — LOW (ref 4.2–5.8)
RBC # BLD: 3.86 M/UL — LOW (ref 4.2–5.8)
RBC # BLD: 4.04 M/UL — LOW (ref 4.2–5.8)
RBC # FLD: 17.2 % — HIGH (ref 10.3–14.5)
RBC # FLD: 17.4 % — HIGH (ref 10.3–14.5)
RBC # FLD: 17.4 % — HIGH (ref 10.3–14.5)
SODIUM SERPL-SCNC: 139 MMOL/L — SIGNIFICANT CHANGE UP (ref 135–145)
WBC # BLD: 10.99 K/UL — HIGH (ref 3.8–10.5)
WBC # BLD: 11.51 K/UL — HIGH (ref 3.8–10.5)
WBC # BLD: 12.87 K/UL — HIGH (ref 3.8–10.5)
WBC # FLD AUTO: 10.99 K/UL — HIGH (ref 3.8–10.5)
WBC # FLD AUTO: 11.51 K/UL — HIGH (ref 3.8–10.5)
WBC # FLD AUTO: 12.87 K/UL — HIGH (ref 3.8–10.5)

## 2019-05-12 PROCEDURE — 99291 CRITICAL CARE FIRST HOUR: CPT

## 2019-05-12 PROCEDURE — 71045 X-RAY EXAM CHEST 1 VIEW: CPT | Mod: 26

## 2019-05-12 PROCEDURE — 99292 CRITICAL CARE ADDL 30 MIN: CPT

## 2019-05-12 RX ORDER — LABETALOL HCL 100 MG
10 TABLET ORAL ONCE
Refills: 0 | Status: COMPLETED | OUTPATIENT
Start: 2019-05-12 | End: 2019-05-12

## 2019-05-12 RX ORDER — HYDROMORPHONE HYDROCHLORIDE 2 MG/ML
0.5 INJECTION INTRAMUSCULAR; INTRAVENOUS; SUBCUTANEOUS ONCE
Refills: 0 | Status: DISCONTINUED | OUTPATIENT
Start: 2019-05-12 | End: 2019-05-12

## 2019-05-12 RX ORDER — LIDOCAINE 4 G/100G
1 CREAM TOPICAL ONCE
Refills: 0 | Status: COMPLETED | OUTPATIENT
Start: 2019-05-12 | End: 2019-05-12

## 2019-05-12 RX ORDER — ACETAMINOPHEN 500 MG
650 TABLET ORAL EVERY 6 HOURS
Refills: 0 | Status: DISCONTINUED | OUTPATIENT
Start: 2019-05-12 | End: 2019-05-17

## 2019-05-12 RX ORDER — AMIODARONE HYDROCHLORIDE 400 MG/1
150 TABLET ORAL ONCE
Refills: 0 | Status: COMPLETED | OUTPATIENT
Start: 2019-05-12 | End: 2019-05-12

## 2019-05-12 RX ORDER — FUROSEMIDE 40 MG
20 TABLET ORAL ONCE
Refills: 0 | Status: COMPLETED | OUTPATIENT
Start: 2019-05-12 | End: 2019-05-12

## 2019-05-12 RX ORDER — METOPROLOL TARTRATE 50 MG
50 TABLET ORAL EVERY 8 HOURS
Refills: 0 | Status: DISCONTINUED | OUTPATIENT
Start: 2019-05-12 | End: 2019-05-14

## 2019-05-12 RX ORDER — ACETAMINOPHEN 500 MG
1000 TABLET ORAL ONCE
Refills: 0 | Status: COMPLETED | OUTPATIENT
Start: 2019-05-12 | End: 2019-05-12

## 2019-05-12 RX ORDER — LIDOCAINE 4 G/100G
1 CREAM TOPICAL DAILY
Refills: 0 | Status: DISCONTINUED | OUTPATIENT
Start: 2019-05-12 | End: 2019-05-17

## 2019-05-12 RX ORDER — SODIUM,POTASSIUM PHOSPHATES 278-250MG
2 POWDER IN PACKET (EA) ORAL ONCE
Refills: 0 | Status: COMPLETED | OUTPATIENT
Start: 2019-05-12 | End: 2019-05-12

## 2019-05-12 RX ORDER — METOPROLOL TARTRATE 50 MG
25 TABLET ORAL ONCE
Refills: 0 | Status: COMPLETED | OUTPATIENT
Start: 2019-05-12 | End: 2019-05-12

## 2019-05-12 RX ORDER — AMIODARONE HYDROCHLORIDE 400 MG/1
0.5 TABLET ORAL
Qty: 900 | Refills: 0 | Status: DISCONTINUED | OUTPATIENT
Start: 2019-05-12 | End: 2019-05-13

## 2019-05-12 RX ORDER — METOPROLOL TARTRATE 50 MG
25 TABLET ORAL EVERY 8 HOURS
Refills: 0 | Status: DISCONTINUED | OUTPATIENT
Start: 2019-05-12 | End: 2019-05-12

## 2019-05-12 RX ADMIN — PANTOPRAZOLE SODIUM 40 MILLIGRAM(S): 20 TABLET, DELAYED RELEASE ORAL at 08:03

## 2019-05-12 RX ADMIN — Medication 50 MILLIGRAM(S): at 21:00

## 2019-05-12 RX ADMIN — Medication 100 MILLIGRAM(S): at 05:11

## 2019-05-12 RX ADMIN — Medication 10 MILLIGRAM(S): at 10:00

## 2019-05-12 RX ADMIN — Medication 25 MILLIGRAM(S): at 01:05

## 2019-05-12 RX ADMIN — LIDOCAINE 1 PATCH: 4 CREAM TOPICAL at 05:12

## 2019-05-12 RX ADMIN — AMIODARONE HYDROCHLORIDE 600 MILLIGRAM(S): 400 TABLET ORAL at 20:00

## 2019-05-12 RX ADMIN — HEPARIN SODIUM 7500 UNIT(S): 5000 INJECTION INTRAVENOUS; SUBCUTANEOUS at 13:26

## 2019-05-12 RX ADMIN — Medication 2: at 11:56

## 2019-05-12 RX ADMIN — HEPARIN SODIUM 7500 UNIT(S): 5000 INJECTION INTRAVENOUS; SUBCUTANEOUS at 21:00

## 2019-05-12 RX ADMIN — AMIODARONE HYDROCHLORIDE 16.67 MG/MIN: 400 TABLET ORAL at 23:00

## 2019-05-12 RX ADMIN — Medication 10 MILLIGRAM(S): at 16:37

## 2019-05-12 RX ADMIN — Medication 25 MILLIGRAM(S): at 05:12

## 2019-05-12 RX ADMIN — SENNA PLUS 2 TABLET(S): 8.6 TABLET ORAL at 21:00

## 2019-05-12 RX ADMIN — Medication 50 MILLIGRAM(S): at 13:26

## 2019-05-12 RX ADMIN — HEPARIN SODIUM 7500 UNIT(S): 5000 INJECTION INTRAVENOUS; SUBCUTANEOUS at 05:10

## 2019-05-12 RX ADMIN — Medication 30 MG/HR: at 05:10

## 2019-05-12 RX ADMIN — HYDROMORPHONE HYDROCHLORIDE 0.5 MILLIGRAM(S): 2 INJECTION INTRAMUSCULAR; INTRAVENOUS; SUBCUTANEOUS at 16:37

## 2019-05-12 RX ADMIN — HYDROMORPHONE HYDROCHLORIDE 0.5 MILLIGRAM(S): 2 INJECTION INTRAMUSCULAR; INTRAVENOUS; SUBCUTANEOUS at 16:44

## 2019-05-12 RX ADMIN — LIDOCAINE 1 PATCH: 4 CREAM TOPICAL at 16:44

## 2019-05-12 RX ADMIN — Medication 100 MILLIGRAM(S): at 13:27

## 2019-05-12 RX ADMIN — LIDOCAINE 1 PATCH: 4 CREAM TOPICAL at 17:48

## 2019-05-12 RX ADMIN — LIDOCAINE 1 PATCH: 4 CREAM TOPICAL at 03:44

## 2019-05-12 RX ADMIN — Medication 2 TABLET(S): at 05:46

## 2019-05-12 RX ADMIN — CHLORHEXIDINE GLUCONATE 1 APPLICATION(S): 213 SOLUTION TOPICAL at 11:34

## 2019-05-12 RX ADMIN — LIDOCAINE 1 PATCH: 4 CREAM TOPICAL at 19:16

## 2019-05-12 RX ADMIN — Medication 20 MILLIGRAM(S): at 08:04

## 2019-05-12 RX ADMIN — Medication 1000 MILLIGRAM(S): at 04:19

## 2019-05-12 RX ADMIN — AMIODARONE HYDROCHLORIDE 100 MILLIGRAM(S): 400 TABLET ORAL at 17:04

## 2019-05-12 RX ADMIN — Medication 100 MILLIGRAM(S): at 21:00

## 2019-05-12 RX ADMIN — AMIODARONE HYDROCHLORIDE 16.67 MG/MIN: 400 TABLET ORAL at 22:06

## 2019-05-12 RX ADMIN — Medication 25 MILLIGRAM(S): at 08:56

## 2019-05-12 RX ADMIN — Medication 400 MILLIGRAM(S): at 03:43

## 2019-05-12 NOTE — PROGRESS NOTE ADULT - SUBJECTIVE AND OBJECTIVE BOX
CTICU  CRITICAL  CARE  attending     Hand off received 					   Pertinent clinical, laboratory, radiographic, hemodynamic, echocardiographic, respiratory data, microbiologic data and chart were reviewed and analyzed frequently throughout the course of the day and night  Patient seen and examined with CTS/ SH attending at bedside  Pt is a 43y , Male, HEALTH ISSUES - PROBLEM Dx:      , FAMILY HISTORY:  No pertinent family history in first degree relatives  PAST MEDICAL & SURGICAL HISTORY:  No pertinent past medical history  No significant past surgical history    Patient is a 43y old  Male who presents with a chief complaint of Type A Aortic Dissection (11 May 2019 22:13)      14 system review was unremarkable  acute changes include acute respiratory failure  Vital signs, hemodynamic and respiratory parameters were reviewed from the bedside nursing flowsheet.  ICU Vital Signs Last 24 Hrs  T(C): 37.4 (12 May 2019 13:01), Max: 37.7 (11 May 2019 22:05)  T(F): 99.4 (12 May 2019 13:01), Max: 99.8 (11 May 2019 22:05)  HR: 80 (12 May 2019 15:00) (74 - 96)  BP: 167/73 (12 May 2019 15:00) (100/64 - 167/73)  BP(mean): 94 (12 May 2019 15:00) (74 - 112)  ABP: 170/68 (12 May 2019 13:00) (114/52 - 176/64)  ABP(mean): 100 (12 May 2019 13:00) (66 - 112)  RR: 20 (12 May 2019 15:00) (15 - 22)  SpO2: 99% (12 May 2019 15:00) (88% - 100%)    Adult Advanced Hemodynamics Last 24 Hrs  CVP(mm Hg): --  CVP(cm H2O): --  CO: --  CI: --  PA: --  PA(mean): --  PCWP: --  SVR: --  SVRI: --  PVR: --  PVRI: --, ABG - ( 12 May 2019 10:23 )  pH, Arterial: 7.49  pH, Blood: x     /  pCO2: 38    /  pO2: 102   / HCO3: 28    / Base Excess: 4.8   /  SaO2: 98                  Intake and output was reviewed and the fluid balance was calculated  Daily     Daily   I&O's Summary    11 May 2019 07:01  -  12 May 2019 07:00  --------------------------------------------------------  IN: 1405 mL / OUT: 2875 mL / NET: -1470 mL    12 May 2019 07:01  -  12 May 2019 16:06  --------------------------------------------------------  IN: 200 mL / OUT: 990 mL / NET: -790 mL        All lines and drain sites were assessed  [facility]      POCT Blood Glucose.: 157 mg/dL (12 May 2019 11:49)    No acute change in mental status  Auscultation of the chest reveals equal bs  Abdomen is soft  Extremities are warm and well perfused  Wounds appear clean and unremarkable  Antibiotics are periop    labs  CBC Full  -  ( 12 May 2019 10:28 )  WBC Count : 12.87 K/uL  RBC Count : 4.04 M/uL  Hemoglobin : 9.0 g/dL  Hematocrit : 27.9 %  Platelet Count - Automated : 201 K/uL  Mean Cell Volume : 69.1 fl  Mean Cell Hemoglobin : 22.3 pg  Mean Cell Hemoglobin Concentration : 32.3 gm/dL  Auto Neutrophil # : x  Auto Lymphocyte # : x  Auto Monocyte # : x  Auto Eosinophil # : x  Auto Basophil # : x  Auto Neutrophil % : x  Auto Lymphocyte % : x  Auto Monocyte % : x  Auto Eosinophil % : x  Auto Basophil % : x    05-12    139  |  100  |  19  ----------------------------<  112<H>  4.1   |  27  |  0.96    Ca    9.3      12 May 2019 10:28  Phos  2.4     05-12  Mg     2.4     05-12    TPro  7.3  /  Alb  3.3  /  TBili  0.5  /  DBili  x   /  AST  48<H>  /  ALT  33  /  AlkPhos  63  05-12    PT/INR - ( 12 May 2019 03:45 )   PT: 16.4 sec;   INR: 1.44          PTT - ( 12 May 2019 03:45 )  PTT:36.5 sec  The current medications were reviewed   MEDICATIONS  (STANDING):  chlorhexidine 2% Cloths 1 Application(s) Topical daily  dextrose 5%. 1000 milliLiter(s) (50 mL/Hr) IV Continuous <Continuous>  dextrose 50% Injectable 50 milliLiter(s) IV Push every 15 minutes  dextrose 50% Injectable 25 milliLiter(s) IV Push every 15 minutes  docusate sodium 100 milliGRAM(s) Oral three times a day  heparin  Injectable 7500 Unit(s) SubCutaneous every 8 hours  insulin lispro (HumaLOG) corrective regimen sliding scale   SubCutaneous Before meals and at bedtime  lidocaine   Patch 1 Patch Transdermal daily  metoprolol tartrate 50 milliGRAM(s) Oral every 8 hours  pantoprazole    Tablet 40 milliGRAM(s) Oral before breakfast  senna 2 Tablet(s) Oral at bedtime  sodium chloride 0.9%. 1000 milliLiter(s) (10 mL/Hr) IV Continuous <Continuous>    MEDICATIONS  (PRN):  acetaminophen   Tablet .. 650 milliGRAM(s) Oral every 6 hours PRN Mild Pain (1 - 3)  dextrose 40% Gel 15 Gram(s) Oral once PRN Blood Glucose LESS THAN 70 milliGRAM(s)/deciliter  glucagon  Injectable 1 milliGRAM(s) IntraMuscular once PRN Glucose LESS THAN 70 milligrams/deciliter  oxyCODONE    5 mG/acetaminophen 325 mG 1 Tablet(s) Oral every 6 hours PRN Moderate Pain (4 - 6)  polyethylene glycol 3350 17 Gram(s) Oral daily PRN Constipation       PROBLEM LIST/ ASSESSMENT:  HEALTH ISSUES - PROBLEM Dx:      ,   Patient is a 43y old  Male who presents with a chief complaint of Type A Aortic Dissection (11 May 2019 22:13)     s/p cardiac surgery        Acute respiratory failure ruled in due to hypoxemia, O2 sats < 91% on RA treated with HFNC          My plan includes :  close hemodynamic, ventilatory and drain monitoring and management per post op routine    Monitor for arrhythmias and monitor parameters for organ perfusion  beta blockade not administered due to hemodynamic instability and bradycardia  monitor neurologic status  Head of the bed should remain elevated to 45 deg .   chest PT and IS will be encouraged  monitor adequacy of oxygenation and ventilation and attempt to wean oxygen  antibiotic regimen will be tailored to the clinical, laboratory and microbiologic data  Nutritional goals will be met using po eventually , ensure adequate caloric intake and montior the same  Stress ulcer and VTE prophylaxis will be achieved    Glycemic control is satisfactory  Electrolytes have been repleted as necessary and wound care has been carried out. Pain control has been achieved.   agressive physical therapy and early mobility and ambulation goals will be met   The family was updated about the course and plan  CRITICAL CARE TIME personally provided by me  in evaluation and management, reassessments, review and interpretation of labs and x-rays, ventilator and hemodynamic management, formulating a plan and coordinating care: ___90____ MIN.  Time does not include procedural time.  CTICU ATTENDING     					    Riley Horn MD

## 2019-05-12 NOTE — CHART NOTE - NSCHARTNOTEFT_GEN_A_CORE
CT Removal:    Pt seen and examined at bedside.  Case discussed with Dr. Isidro.  Minimal output from CT.  No air leak appreciated.  CT removed without incident per Dr. Isidro request.  Occlusive DSD placed.  No CXR taken d/t mediastinal CT.  Pt tolerated procedure well.

## 2019-05-13 LAB
ALBUMIN SERPL ELPH-MCNC: 2.8 G/DL — LOW (ref 3.3–5)
ALBUMIN SERPL ELPH-MCNC: 3.4 G/DL — SIGNIFICANT CHANGE UP (ref 3.3–5)
ALP SERPL-CCNC: 63 U/L — SIGNIFICANT CHANGE UP (ref 40–120)
ALP SERPL-CCNC: 63 U/L — SIGNIFICANT CHANGE UP (ref 40–120)
ALT FLD-CCNC: 25 U/L — SIGNIFICANT CHANGE UP (ref 10–45)
ALT FLD-CCNC: 28 U/L — SIGNIFICANT CHANGE UP (ref 10–45)
ANION GAP SERPL CALC-SCNC: 11 MMOL/L — SIGNIFICANT CHANGE UP (ref 5–17)
ANION GAP SERPL CALC-SCNC: 13 MMOL/L — SIGNIFICANT CHANGE UP (ref 5–17)
APTT BLD: 31.9 SEC — SIGNIFICANT CHANGE UP (ref 27.5–36.3)
APTT BLD: 35.7 SEC — SIGNIFICANT CHANGE UP (ref 27.5–36.3)
AST SERPL-CCNC: 31 U/L — SIGNIFICANT CHANGE UP (ref 10–40)
AST SERPL-CCNC: 34 U/L — SIGNIFICANT CHANGE UP (ref 10–40)
BILIRUB SERPL-MCNC: 0.4 MG/DL — SIGNIFICANT CHANGE UP (ref 0.2–1.2)
BILIRUB SERPL-MCNC: 0.5 MG/DL — SIGNIFICANT CHANGE UP (ref 0.2–1.2)
BUN SERPL-MCNC: 18 MG/DL — SIGNIFICANT CHANGE UP (ref 7–23)
BUN SERPL-MCNC: 19 MG/DL — SIGNIFICANT CHANGE UP (ref 7–23)
CALCIUM SERPL-MCNC: 9.1 MG/DL — SIGNIFICANT CHANGE UP (ref 8.4–10.5)
CALCIUM SERPL-MCNC: 9.3 MG/DL — SIGNIFICANT CHANGE UP (ref 8.4–10.5)
CHLORIDE SERPL-SCNC: 97 MMOL/L — SIGNIFICANT CHANGE UP (ref 96–108)
CHLORIDE SERPL-SCNC: 97 MMOL/L — SIGNIFICANT CHANGE UP (ref 96–108)
CO2 SERPL-SCNC: 28 MMOL/L — SIGNIFICANT CHANGE UP (ref 22–31)
CO2 SERPL-SCNC: 29 MMOL/L — SIGNIFICANT CHANGE UP (ref 22–31)
CREAT SERPL-MCNC: 1.03 MG/DL — SIGNIFICANT CHANGE UP (ref 0.5–1.3)
CREAT SERPL-MCNC: 1.06 MG/DL — SIGNIFICANT CHANGE UP (ref 0.5–1.3)
GLUCOSE BLDC GLUCOMTR-MCNC: 104 MG/DL — HIGH (ref 70–99)
GLUCOSE BLDC GLUCOMTR-MCNC: 119 MG/DL — HIGH (ref 70–99)
GLUCOSE BLDC GLUCOMTR-MCNC: 183 MG/DL — HIGH (ref 70–99)
GLUCOSE BLDC GLUCOMTR-MCNC: 89 MG/DL — SIGNIFICANT CHANGE UP (ref 70–99)
GLUCOSE SERPL-MCNC: 108 MG/DL — HIGH (ref 70–99)
GLUCOSE SERPL-MCNC: 113 MG/DL — HIGH (ref 70–99)
HCT VFR BLD CALC: 25.9 % — LOW (ref 39–50)
HCT VFR BLD CALC: 27.4 % — LOW (ref 39–50)
HGB BLD-MCNC: 8.3 G/DL — LOW (ref 13–17)
HGB BLD-MCNC: 8.7 G/DL — LOW (ref 13–17)
INR BLD: 1.27 — HIGH (ref 0.88–1.16)
INR BLD: 1.31 — HIGH (ref 0.88–1.16)
LACTATE SERPL-SCNC: 0.9 MMOL/L — SIGNIFICANT CHANGE UP (ref 0.5–2)
MAGNESIUM SERPL-MCNC: 2.3 MG/DL — SIGNIFICANT CHANGE UP (ref 1.6–2.6)
MAGNESIUM SERPL-MCNC: 2.3 MG/DL — SIGNIFICANT CHANGE UP (ref 1.6–2.6)
MAGNESIUM SERPL-MCNC: 2.4 MG/DL — SIGNIFICANT CHANGE UP (ref 1.6–2.6)
MCHC RBC-ENTMCNC: 21.9 PG — LOW (ref 27–34)
MCHC RBC-ENTMCNC: 22.1 PG — LOW (ref 27–34)
MCHC RBC-ENTMCNC: 31.8 GM/DL — LOW (ref 32–36)
MCHC RBC-ENTMCNC: 32 GM/DL — SIGNIFICANT CHANGE UP (ref 32–36)
MCV RBC AUTO: 69 FL — LOW (ref 80–100)
MCV RBC AUTO: 69.1 FL — LOW (ref 80–100)
NRBC # BLD: 0 /100 WBCS — SIGNIFICANT CHANGE UP (ref 0–0)
NRBC # BLD: 0 /100 WBCS — SIGNIFICANT CHANGE UP (ref 0–0)
PHOSPHATE SERPL-MCNC: 2.8 MG/DL — SIGNIFICANT CHANGE UP (ref 2.5–4.5)
PHOSPHATE SERPL-MCNC: 3 MG/DL — SIGNIFICANT CHANGE UP (ref 2.5–4.5)
PLATELET # BLD AUTO: 207 K/UL — SIGNIFICANT CHANGE UP (ref 150–400)
PLATELET # BLD AUTO: 253 K/UL — SIGNIFICANT CHANGE UP (ref 150–400)
POTASSIUM SERPL-MCNC: 3.5 MMOL/L — SIGNIFICANT CHANGE UP (ref 3.5–5.3)
POTASSIUM SERPL-MCNC: 3.8 MMOL/L — SIGNIFICANT CHANGE UP (ref 3.5–5.3)
POTASSIUM SERPL-MCNC: 4 MMOL/L — SIGNIFICANT CHANGE UP (ref 3.5–5.3)
POTASSIUM SERPL-SCNC: 3.5 MMOL/L — SIGNIFICANT CHANGE UP (ref 3.5–5.3)
POTASSIUM SERPL-SCNC: 3.8 MMOL/L — SIGNIFICANT CHANGE UP (ref 3.5–5.3)
POTASSIUM SERPL-SCNC: 4 MMOL/L — SIGNIFICANT CHANGE UP (ref 3.5–5.3)
PROT SERPL-MCNC: 7.1 G/DL — SIGNIFICANT CHANGE UP (ref 6–8.3)
PROT SERPL-MCNC: 7.5 G/DL — SIGNIFICANT CHANGE UP (ref 6–8.3)
PROTHROM AB SERPL-ACNC: 14.5 SEC — HIGH (ref 10–12.9)
PROTHROM AB SERPL-ACNC: 14.9 SEC — HIGH (ref 10–12.9)
RBC # BLD: 3.75 M/UL — LOW (ref 4.2–5.8)
RBC # BLD: 3.97 M/UL — LOW (ref 4.2–5.8)
RBC # FLD: 17 % — HIGH (ref 10.3–14.5)
RBC # FLD: 17 % — HIGH (ref 10.3–14.5)
SODIUM SERPL-SCNC: 137 MMOL/L — SIGNIFICANT CHANGE UP (ref 135–145)
SODIUM SERPL-SCNC: 138 MMOL/L — SIGNIFICANT CHANGE UP (ref 135–145)
WBC # BLD: 11.51 K/UL — HIGH (ref 3.8–10.5)
WBC # BLD: 11.53 K/UL — HIGH (ref 3.8–10.5)
WBC # FLD AUTO: 11.51 K/UL — HIGH (ref 3.8–10.5)
WBC # FLD AUTO: 11.53 K/UL — HIGH (ref 3.8–10.5)

## 2019-05-13 PROCEDURE — 99291 CRITICAL CARE FIRST HOUR: CPT

## 2019-05-13 PROCEDURE — 71045 X-RAY EXAM CHEST 1 VIEW: CPT | Mod: 26

## 2019-05-13 PROCEDURE — 76604 US EXAM CHEST: CPT | Mod: 26

## 2019-05-13 PROCEDURE — 99292 CRITICAL CARE ADDL 30 MIN: CPT

## 2019-05-13 RX ORDER — AMIODARONE HYDROCHLORIDE 400 MG/1
TABLET ORAL
Refills: 0 | Status: DISCONTINUED | OUTPATIENT
Start: 2019-05-13 | End: 2019-05-17

## 2019-05-13 RX ORDER — FUROSEMIDE 40 MG
20 TABLET ORAL ONCE
Refills: 0 | Status: COMPLETED | OUTPATIENT
Start: 2019-05-13 | End: 2019-05-13

## 2019-05-13 RX ORDER — AMIODARONE HYDROCHLORIDE 400 MG/1
400 TABLET ORAL EVERY 8 HOURS
Refills: 0 | Status: COMPLETED | OUTPATIENT
Start: 2019-05-13 | End: 2019-05-17

## 2019-05-13 RX ORDER — AMIODARONE HYDROCHLORIDE 400 MG/1
200 TABLET ORAL DAILY
Refills: 0 | Status: DISCONTINUED | OUTPATIENT
Start: 2019-05-17 | End: 2019-05-17

## 2019-05-13 RX ORDER — POTASSIUM CHLORIDE 20 MEQ
20 PACKET (EA) ORAL ONCE
Refills: 0 | Status: COMPLETED | OUTPATIENT
Start: 2019-05-13 | End: 2019-05-13

## 2019-05-13 RX ORDER — ACETAMINOPHEN 500 MG
1000 TABLET ORAL ONCE
Refills: 0 | Status: COMPLETED | OUTPATIENT
Start: 2019-05-13 | End: 2019-05-13

## 2019-05-13 RX ORDER — AMLODIPINE BESYLATE 2.5 MG/1
5 TABLET ORAL DAILY
Refills: 0 | Status: DISCONTINUED | OUTPATIENT
Start: 2019-05-13 | End: 2019-05-17

## 2019-05-13 RX ADMIN — Medication 20 MILLIGRAM(S): at 01:30

## 2019-05-13 RX ADMIN — HEPARIN SODIUM 7500 UNIT(S): 5000 INJECTION INTRAVENOUS; SUBCUTANEOUS at 21:00

## 2019-05-13 RX ADMIN — Medication 100 MILLIGRAM(S): at 21:01

## 2019-05-13 RX ADMIN — Medication 20 MILLIGRAM(S): at 13:17

## 2019-05-13 RX ADMIN — Medication 650 MILLIGRAM(S): at 21:01

## 2019-05-13 RX ADMIN — Medication 20 MILLIEQUIVALENT(S): at 14:11

## 2019-05-13 RX ADMIN — Medication 50 MILLIGRAM(S): at 21:01

## 2019-05-13 RX ADMIN — HEPARIN SODIUM 7500 UNIT(S): 5000 INJECTION INTRAVENOUS; SUBCUTANEOUS at 13:24

## 2019-05-13 RX ADMIN — Medication 400 MILLIGRAM(S): at 08:30

## 2019-05-13 RX ADMIN — HEPARIN SODIUM 7500 UNIT(S): 5000 INJECTION INTRAVENOUS; SUBCUTANEOUS at 05:39

## 2019-05-13 RX ADMIN — Medication 50 MILLIGRAM(S): at 13:24

## 2019-05-13 RX ADMIN — LIDOCAINE 1 PATCH: 4 CREAM TOPICAL at 05:18

## 2019-05-13 RX ADMIN — Medication 100 MILLIGRAM(S): at 05:40

## 2019-05-13 RX ADMIN — Medication 100 MILLIEQUIVALENT(S): at 23:00

## 2019-05-13 RX ADMIN — Medication 650 MILLIGRAM(S): at 22:00

## 2019-05-13 RX ADMIN — Medication 100 MILLIGRAM(S): at 13:20

## 2019-05-13 RX ADMIN — SENNA PLUS 2 TABLET(S): 8.6 TABLET ORAL at 21:01

## 2019-05-13 RX ADMIN — LIDOCAINE 1 PATCH: 4 CREAM TOPICAL at 18:10

## 2019-05-13 RX ADMIN — Medication 2: at 22:54

## 2019-05-13 RX ADMIN — AMIODARONE HYDROCHLORIDE 400 MILLIGRAM(S): 400 TABLET ORAL at 21:00

## 2019-05-13 RX ADMIN — Medication 50 MILLIGRAM(S): at 05:39

## 2019-05-13 RX ADMIN — Medication 1000 MILLIGRAM(S): at 08:45

## 2019-05-13 RX ADMIN — LIDOCAINE 1 PATCH: 4 CREAM TOPICAL at 13:19

## 2019-05-13 RX ADMIN — AMLODIPINE BESYLATE 5 MILLIGRAM(S): 2.5 TABLET ORAL at 22:22

## 2019-05-13 RX ADMIN — CHLORHEXIDINE GLUCONATE 1 APPLICATION(S): 213 SOLUTION TOPICAL at 13:24

## 2019-05-13 RX ADMIN — PANTOPRAZOLE SODIUM 40 MILLIGRAM(S): 20 TABLET, DELAYED RELEASE ORAL at 06:33

## 2019-05-13 NOTE — PROGRESS NOTE ADULT - SUBJECTIVE AND OBJECTIVE BOX
CTICU  CRITICAL  CARE  attending     Hand off received 					   Pertinent clinical, laboratory, radiographic, hemodynamic, echocardiographic, respiratory data, microbiologic data and chart were reviewed and analyzed frequently throughout the course of the day and night  Patient seen and examined with CTS/ SH attending at bedside  Pt is a 43y , Male, HEALTH ISSUES - PROBLEM Dx:      , FAMILY HISTORY:  No pertinent family history in first degree relatives  PAST MEDICAL & SURGICAL HISTORY:  No pertinent past medical history  No significant past surgical history    Patient is a 43y old  Male who presents with a chief complaint of Type A Aortic Dissection (12 May 2019 16:05)      14 system review was unremarkable  acute changes include acute respiratory failure  Vital signs, hemodynamic and respiratory parameters were reviewed from the bedside nursing flowsheet.  ICU Vital Signs Last 24 Hrs  T(C): 37.3 (13 May 2019 17:55), Max: 37.5 (13 May 2019 05:01)  T(F): 99.2 (13 May 2019 17:55), Max: 99.5 (13 May 2019 05:01)  HR: 80 (13 May 2019 19:00) (68 - 116)  BP: 163/77 (13 May 2019 19:00) (114/64 - 169/81)  BP(mean): 112 (13 May 2019 19:00) (81 - 114)  ABP: --  ABP(mean): --  RR: 19 (13 May 2019 19:00) (14 - 25)  SpO2: 97% (13 May 2019 19:00) (94% - 100%)    Adult Advanced Hemodynamics Last 24 Hrs  CVP(mm Hg): --  CVP(cm H2O): --  CO: --  CI: --  PA: --  PA(mean): --  PCWP: --  SVR: --  SVRI: --  PVR: --  PVRI: --, ABG - ( 12 May 2019 10:23 )  pH, Arterial: 7.49  pH, Blood: x     /  pCO2: 38    /  pO2: 102   / HCO3: 28    / Base Excess: 4.8   /  SaO2: 98                  Intake and output was reviewed and the fluid balance was calculated  Daily     Daily   I&O's Summary    12 May 2019 07:01  -  13 May 2019 07:00  --------------------------------------------------------  IN: 367 mL / OUT: 1715 mL / NET: -1348 mL    13 May 2019 07:01  -  13 May 2019 19:50  --------------------------------------------------------  IN: 470.3 mL / OUT: 560 mL / NET: -89.7 mL        All lines and drain sites were assessed  [facility]      POCT Blood Glucose.: 89 mg/dL (13 May 2019 16:12)    No acute change in mental status  Auscultation of the chest reveals equal bs  Abdomen is soft  Extremities are warm and well perfused  Wounds appear clean and unremarkable  Antibiotics are periop    labs  CBC Full  -  ( 13 May 2019 12:30 )  WBC Count : 11.53 K/uL  RBC Count : 3.97 M/uL  Hemoglobin : 8.7 g/dL  Hematocrit : 27.4 %  Platelet Count - Automated : 253 K/uL  Mean Cell Volume : 69.0 fl  Mean Cell Hemoglobin : 21.9 pg  Mean Cell Hemoglobin Concentration : 31.8 gm/dL  Auto Neutrophil # : x  Auto Lymphocyte # : x  Auto Monocyte # : x  Auto Eosinophil # : x  Auto Basophil # : x  Auto Neutrophil % : x  Auto Lymphocyte % : x  Auto Monocyte % : x  Auto Eosinophil % : x  Auto Basophil % : x    05-13    138  |  97  |  19  ----------------------------<  108<H>  3.8   |  28  |  1.03    Ca    9.3      13 May 2019 12:30  Phos  3.0     05-13  Mg     2.4     05-13    TPro  7.5  /  Alb  3.4  /  TBili  0.5  /  DBili  x   /  AST  34  /  ALT  28  /  AlkPhos  63  05-13    PT/INR - ( 13 May 2019 12:30 )   PT: 14.5 sec;   INR: 1.27          PTT - ( 13 May 2019 12:30 )  PTT:35.7 sec  The current medications were reviewed   MEDICATIONS  (STANDING):  amiodarone    Tablet   Oral   amiodarone    Tablet 400 milliGRAM(s) Oral every 8 hours  chlorhexidine 2% Cloths 1 Application(s) Topical daily  dextrose 5%. 1000 milliLiter(s) (50 mL/Hr) IV Continuous <Continuous>  dextrose 50% Injectable 50 milliLiter(s) IV Push every 15 minutes  dextrose 50% Injectable 25 milliLiter(s) IV Push every 15 minutes  docusate sodium 100 milliGRAM(s) Oral three times a day  heparin  Injectable 7500 Unit(s) SubCutaneous every 8 hours  insulin lispro (HumaLOG) corrective regimen sliding scale   SubCutaneous Before meals and at bedtime  lidocaine   Patch 1 Patch Transdermal daily  metoprolol tartrate 50 milliGRAM(s) Oral every 8 hours  pantoprazole    Tablet 40 milliGRAM(s) Oral before breakfast  senna 2 Tablet(s) Oral at bedtime  sodium chloride 0.9%. 1000 milliLiter(s) (10 mL/Hr) IV Continuous <Continuous>    MEDICATIONS  (PRN):  acetaminophen   Tablet .. 650 milliGRAM(s) Oral every 6 hours PRN Mild Pain (1 - 3)  dextrose 40% Gel 15 Gram(s) Oral once PRN Blood Glucose LESS THAN 70 milliGRAM(s)/deciliter  glucagon  Injectable 1 milliGRAM(s) IntraMuscular once PRN Glucose LESS THAN 70 milligrams/deciliter  oxyCODONE    5 mG/acetaminophen 325 mG 1 Tablet(s) Oral every 6 hours PRN Moderate Pain (4 - 6)  polyethylene glycol 3350 17 Gram(s) Oral daily PRN Constipation       PROBLEM LIST/ ASSESSMENT:  HEALTH ISSUES - PROBLEM Dx:      ,   Patient is a 43y old  Male who presents with a chief complaint of Type A Aortic Dissection (12 May 2019 16:05)     s/p cardiac surgery      Acute respiratory failure ruled in due to hypoxemia, O2 sats < 91% on RA treated with HFNC      My plan includes :  close hemodynamic, ventilatory and drain monitoring and management per post op routine    Monitor for arrhythmias and monitor parameters for organ perfusion  beta blockade not administered due to hemodynamic instability and bradycardia  monitor neurologic status  Head of the bed should remain elevated to 45 deg .   chest PT and IS will be encouraged  monitor adequacy of oxygenation and ventilation and attempt to wean oxygen  antibiotic regimen will be tailored to the clinical, laboratory and microbiologic data  Nutritional goals will be met using po eventually , ensure adequate caloric intake and montior the same  Stress ulcer and VTE prophylaxis will be achieved    Glycemic control is satisfactory  Electrolytes have been repleted as necessary and wound care has been carried out. Pain control has been achieved.   agressive physical therapy and early mobility and ambulation goals will be met   The family was updated about the course and plan  CRITICAL CARE TIME personally provided by me  in evaluation and management, reassessments, review and interpretation of labs and x-rays, ventilator and hemodynamic management, formulating a plan and coordinating care: ___90____ MIN.  Time does not include procedural time.  CTICU ATTENDING     					    Riley Horn MD

## 2019-05-14 LAB
ALBUMIN SERPL ELPH-MCNC: 3.2 G/DL — LOW (ref 3.3–5)
ALP SERPL-CCNC: 54 U/L — SIGNIFICANT CHANGE UP (ref 40–120)
ALT FLD-CCNC: 29 U/L — SIGNIFICANT CHANGE UP (ref 10–45)
ANION GAP SERPL CALC-SCNC: 12 MMOL/L — SIGNIFICANT CHANGE UP (ref 5–17)
ANION GAP SERPL CALC-SCNC: 8 MMOL/L — SIGNIFICANT CHANGE UP (ref 5–17)
APTT BLD: 34.8 SEC — SIGNIFICANT CHANGE UP (ref 27.5–36.3)
AST SERPL-CCNC: 30 U/L — SIGNIFICANT CHANGE UP (ref 10–40)
BILIRUB SERPL-MCNC: 0.3 MG/DL — SIGNIFICANT CHANGE UP (ref 0.2–1.2)
BUN SERPL-MCNC: 14 MG/DL — SIGNIFICANT CHANGE UP (ref 7–23)
BUN SERPL-MCNC: 16 MG/DL — SIGNIFICANT CHANGE UP (ref 7–23)
CALCIUM SERPL-MCNC: 8.4 MG/DL — SIGNIFICANT CHANGE UP (ref 8.4–10.5)
CALCIUM SERPL-MCNC: 8.8 MG/DL — SIGNIFICANT CHANGE UP (ref 8.4–10.5)
CHLORIDE SERPL-SCNC: 99 MMOL/L — SIGNIFICANT CHANGE UP (ref 96–108)
CHLORIDE SERPL-SCNC: 99 MMOL/L — SIGNIFICANT CHANGE UP (ref 96–108)
CO2 SERPL-SCNC: 27 MMOL/L — SIGNIFICANT CHANGE UP (ref 22–31)
CO2 SERPL-SCNC: 31 MMOL/L — SIGNIFICANT CHANGE UP (ref 22–31)
CREAT SERPL-MCNC: 1.03 MG/DL — SIGNIFICANT CHANGE UP (ref 0.5–1.3)
CREAT SERPL-MCNC: 1.04 MG/DL — SIGNIFICANT CHANGE UP (ref 0.5–1.3)
GLUCOSE BLDC GLUCOMTR-MCNC: 94 MG/DL — SIGNIFICANT CHANGE UP (ref 70–99)
GLUCOSE BLDC GLUCOMTR-MCNC: 97 MG/DL — SIGNIFICANT CHANGE UP (ref 70–99)
GLUCOSE BLDC GLUCOMTR-MCNC: 99 MG/DL — SIGNIFICANT CHANGE UP (ref 70–99)
GLUCOSE SERPL-MCNC: 101 MG/DL — HIGH (ref 70–99)
GLUCOSE SERPL-MCNC: 91 MG/DL — SIGNIFICANT CHANGE UP (ref 70–99)
HCT VFR BLD CALC: 25.9 % — LOW (ref 39–50)
HGB BLD-MCNC: 8.3 G/DL — LOW (ref 13–17)
INR BLD: 1.32 — HIGH (ref 0.88–1.16)
LACTATE SERPL-SCNC: 0.9 MMOL/L — SIGNIFICANT CHANGE UP (ref 0.5–2)
MAGNESIUM SERPL-MCNC: 1.9 MG/DL — SIGNIFICANT CHANGE UP (ref 1.6–2.6)
MAGNESIUM SERPL-MCNC: 2.2 MG/DL — SIGNIFICANT CHANGE UP (ref 1.6–2.6)
MCHC RBC-ENTMCNC: 22.2 PG — LOW (ref 27–34)
MCHC RBC-ENTMCNC: 32 GM/DL — SIGNIFICANT CHANGE UP (ref 32–36)
MCV RBC AUTO: 69.3 FL — LOW (ref 80–100)
NRBC # BLD: 0 /100 WBCS — SIGNIFICANT CHANGE UP (ref 0–0)
PHOSPHATE SERPL-MCNC: 2.9 MG/DL — SIGNIFICANT CHANGE UP (ref 2.5–4.5)
PHOSPHATE SERPL-MCNC: 2.9 MG/DL — SIGNIFICANT CHANGE UP (ref 2.5–4.5)
PLATELET # BLD AUTO: 245 K/UL — SIGNIFICANT CHANGE UP (ref 150–400)
POTASSIUM SERPL-MCNC: 3.3 MMOL/L — LOW (ref 3.5–5.3)
POTASSIUM SERPL-MCNC: 4 MMOL/L — SIGNIFICANT CHANGE UP (ref 3.5–5.3)
POTASSIUM SERPL-SCNC: 3.3 MMOL/L — LOW (ref 3.5–5.3)
POTASSIUM SERPL-SCNC: 4 MMOL/L — SIGNIFICANT CHANGE UP (ref 3.5–5.3)
PROT SERPL-MCNC: 6.9 G/DL — SIGNIFICANT CHANGE UP (ref 6–8.3)
PROTHROM AB SERPL-ACNC: 15 SEC — HIGH (ref 10–12.9)
RBC # BLD: 3.74 M/UL — LOW (ref 4.2–5.8)
RBC # FLD: 17 % — HIGH (ref 10.3–14.5)
SODIUM SERPL-SCNC: 138 MMOL/L — SIGNIFICANT CHANGE UP (ref 135–145)
SODIUM SERPL-SCNC: 138 MMOL/L — SIGNIFICANT CHANGE UP (ref 135–145)
SURGICAL PATHOLOGY STUDY: SIGNIFICANT CHANGE UP
WBC # BLD: 10.12 K/UL — SIGNIFICANT CHANGE UP (ref 3.8–10.5)
WBC # FLD AUTO: 10.12 K/UL — SIGNIFICANT CHANGE UP (ref 3.8–10.5)

## 2019-05-14 PROCEDURE — 93306 TTE W/DOPPLER COMPLETE: CPT | Mod: 26

## 2019-05-14 PROCEDURE — 71045 X-RAY EXAM CHEST 1 VIEW: CPT | Mod: 26

## 2019-05-14 PROCEDURE — 99291 CRITICAL CARE FIRST HOUR: CPT

## 2019-05-14 RX ORDER — AMIODARONE HYDROCHLORIDE 400 MG/1
150 TABLET ORAL ONCE
Refills: 0 | Status: COMPLETED | OUTPATIENT
Start: 2019-05-14 | End: 2019-05-14

## 2019-05-14 RX ORDER — POTASSIUM CHLORIDE 20 MEQ
20 PACKET (EA) ORAL
Refills: 0 | Status: COMPLETED | OUTPATIENT
Start: 2019-05-14 | End: 2019-05-14

## 2019-05-14 RX ORDER — FUROSEMIDE 40 MG
40 TABLET ORAL THREE TIMES A DAY
Refills: 0 | Status: DISCONTINUED | OUTPATIENT
Start: 2019-05-14 | End: 2019-05-15

## 2019-05-14 RX ORDER — IPRATROPIUM/ALBUTEROL SULFATE 18-103MCG
3 AEROSOL WITH ADAPTER (GRAM) INHALATION EVERY 6 HOURS
Refills: 0 | Status: DISCONTINUED | OUTPATIENT
Start: 2019-05-14 | End: 2019-05-17

## 2019-05-14 RX ORDER — METOPROLOL TARTRATE 50 MG
100 TABLET ORAL
Refills: 0 | Status: DISCONTINUED | OUTPATIENT
Start: 2019-05-14 | End: 2019-05-17

## 2019-05-14 RX ORDER — LISINOPRIL 2.5 MG/1
5 TABLET ORAL DAILY
Refills: 0 | Status: DISCONTINUED | OUTPATIENT
Start: 2019-05-14 | End: 2019-05-17

## 2019-05-14 RX ORDER — DILTIAZEM HCL 120 MG
10 CAPSULE, EXT RELEASE 24 HR ORAL ONCE
Refills: 0 | Status: COMPLETED | OUTPATIENT
Start: 2019-05-14 | End: 2019-05-14

## 2019-05-14 RX ORDER — POTASSIUM CHLORIDE 20 MEQ
20 PACKET (EA) ORAL
Refills: 0 | Status: DISCONTINUED | OUTPATIENT
Start: 2019-05-14 | End: 2019-05-17

## 2019-05-14 RX ADMIN — Medication 10 MILLIGRAM(S): at 23:00

## 2019-05-14 RX ADMIN — AMIODARONE HYDROCHLORIDE 400 MILLIGRAM(S): 400 TABLET ORAL at 22:02

## 2019-05-14 RX ADMIN — LISINOPRIL 5 MILLIGRAM(S): 2.5 TABLET ORAL at 19:49

## 2019-05-14 RX ADMIN — SENNA PLUS 2 TABLET(S): 8.6 TABLET ORAL at 22:01

## 2019-05-14 RX ADMIN — Medication 100 MILLIEQUIVALENT(S): at 00:00

## 2019-05-14 RX ADMIN — AMIODARONE HYDROCHLORIDE 400 MILLIGRAM(S): 400 TABLET ORAL at 06:12

## 2019-05-14 RX ADMIN — LIDOCAINE 1 PATCH: 4 CREAM TOPICAL at 17:22

## 2019-05-14 RX ADMIN — Medication 100 MILLIGRAM(S): at 06:12

## 2019-05-14 RX ADMIN — Medication 3 MILLILITER(S): at 11:28

## 2019-05-14 RX ADMIN — Medication 40 MILLIGRAM(S): at 14:54

## 2019-05-14 RX ADMIN — PANTOPRAZOLE SODIUM 40 MILLIGRAM(S): 20 TABLET, DELAYED RELEASE ORAL at 06:12

## 2019-05-14 RX ADMIN — LIDOCAINE 1 PATCH: 4 CREAM TOPICAL at 02:00

## 2019-05-14 RX ADMIN — Medication 100 MILLIGRAM(S): at 17:22

## 2019-05-14 RX ADMIN — Medication 3 MILLILITER(S): at 23:53

## 2019-05-14 RX ADMIN — AMLODIPINE BESYLATE 5 MILLIGRAM(S): 2.5 TABLET ORAL at 06:12

## 2019-05-14 RX ADMIN — Medication 3 MILLILITER(S): at 17:22

## 2019-05-14 RX ADMIN — AMIODARONE HYDROCHLORIDE 400 MILLIGRAM(S): 400 TABLET ORAL at 14:54

## 2019-05-14 RX ADMIN — Medication 100 MILLIGRAM(S): at 22:04

## 2019-05-14 RX ADMIN — Medication 40 MILLIGRAM(S): at 22:05

## 2019-05-14 RX ADMIN — HEPARIN SODIUM 7500 UNIT(S): 5000 INJECTION INTRAVENOUS; SUBCUTANEOUS at 06:12

## 2019-05-14 RX ADMIN — Medication 100 MILLIGRAM(S): at 14:54

## 2019-05-14 RX ADMIN — CHLORHEXIDINE GLUCONATE 1 APPLICATION(S): 213 SOLUTION TOPICAL at 11:56

## 2019-05-14 RX ADMIN — HEPARIN SODIUM 7500 UNIT(S): 5000 INJECTION INTRAVENOUS; SUBCUTANEOUS at 14:54

## 2019-05-14 RX ADMIN — Medication 100 MILLIGRAM(S): at 08:09

## 2019-05-14 RX ADMIN — AMIODARONE HYDROCHLORIDE 600 MILLIGRAM(S): 400 TABLET ORAL at 22:03

## 2019-05-14 RX ADMIN — Medication 20 MILLIEQUIVALENT(S): at 17:22

## 2019-05-14 RX ADMIN — HEPARIN SODIUM 7500 UNIT(S): 5000 INJECTION INTRAVENOUS; SUBCUTANEOUS at 22:03

## 2019-05-14 RX ADMIN — LIDOCAINE 1 PATCH: 4 CREAM TOPICAL at 13:31

## 2019-05-14 NOTE — PROGRESS NOTE ADULT - SUBJECTIVE AND OBJECTIVE BOX
INTERVAL HPI/OVERNIGHT EVENTS:    POD#4 emergent repair Type A aortic dissection  EF 50%    44yo Male with no known Med Hx presenting to [place] ED 5/9 with acute onset SS CP radiating to back with associated dizziness and b/l upper extremities paresthesia.  VS stable on presentation  (143/76) - TYRESE noted (1.5) and CE (-)    CTa C/A/P: acute Type A dissection    emergent transfer and taken to OR    intraop 6 FFP/2 platelet/10 cryo given; 3L Crystalloid per anesthesia record    arrived to ICU [place] 5/10  transiently on Epi - rapidly titrated off and now on Cleviprex and Esmolol  Extubated 5/10  PO anti-HTN meds started to maintain control  5/13 - post-op Fib/flutter - given amiodarone load and converted to sinus - remains in sinus rhythm    Ongoing poor inspiratory effort - diuresis/nebs given; incentive spirometry and ambulating with staff  HFNC 40/40 overnight and at this time - to be titrated to nasal cannula    no acute events reported overnight  metoprolol increased and ACE started this am    ICU Vital Signs Last 24 Hrs  T(C): 36.7 (14 May 2019 09:00), Max: 37.8 (13 May 2019 21:15)  T(F): 98 (14 May 2019 09:00), Max: 100 (13 May 2019 21:15)  HR: 74 (14 May 2019 10:00) (68 - 88)  BP: 143/86 (14 May 2019 10:00) (115/74 - 166/76)  BP(mean): 121 (14 May 2019 09:00) (74 - 131)  SpO2: 95% (14 May 2019 10:00) (93% - 100%) HFNC 40/40    Qtts: None    I&O's Summary    13 May 2019 07:01  -  14 May 2019 07:00  --------------------------------------------------------  IN: 1270.3 mL / OUT: 1060 mL / NET: 210.3 mL    Physical Exam    Heart - regular (-)rub/gallop  Lungs - poor inspiratory effort - no rhonchi/wheeze  Abd - (+)BS soft NTND (-)r/r/g  Ext - warm to touch; no cyanosis/clubbing  Chest - incision site clean/dry  Neuro - alert/oriented and interactive ;non-focal  Skin - no rash    LABS:                        8.3    10.12 )-----------( 245      ( 14 May 2019 03:28 )             25.9     05-14    138  |  99  |  16  ----------------------------<  91  4.0   |  31  |  1.04    Ca    8.8      14 May 2019 03:28  Phos  2.9     05-14  Mg     2.2     05-14    TPro  6.9  /  Alb  3.2<L>  /  TBili  0.3  /  DBili  x   /  AST  30  /  ALT  29  /  AlkPhos  54  05-14    PT/INR - ( 14 May 2019 03:28 )   PT: 15.0 sec;   INR: 1.32     PTT - ( 14 May 2019 03:28 )  PTT:34.8 sec    RADIOLOGY & ADDITIONAL STUDIES: reviewed - improved c/w prior  POC 99  LA 0.9  RPR negative  Fibrilin 1 panel - (P) sent 5/10    Patient s/p emergent repair of type A aortic dissection doing well    1. CV  optimize BP management  metoprolol titrated up - now 100mg BID   lisinopril started  lasix 40 mg IV TID  transient post-op Afib - cont po amio load    2. Pulm   ongoing encouragement to improve resp status  incentive spirometry  titrate off HFNC  nebs/diuresis  pain management    maintain glycemic control  DVT and GI prophylaxis    d/w patients/staff and CTS    I have spent/provided stated minutes of critical care time to this patient: 60 min

## 2019-05-14 NOTE — DIETITIAN INITIAL EVALUATION ADULT. - PERTINENT MEDS FT
heparin, D5, D50, dex gel, glucagon, SSI, lasix, zestril, lopressor, KCl, colace, percocet, protonix, miralax, senna

## 2019-05-14 NOTE — DIETITIAN INITIAL EVALUATION ADULT. - ENERGY NEEDS
Ht 180cm; Wt 120Kg  IBW 77.8Kg; %%  BMI 37    Utilized IBW to calculate needs, pt >120% of IBW. Adjusted for post-op/obesity.

## 2019-05-14 NOTE — DIETITIAN INITIAL EVALUATION ADULT. - OTHER INFO
42y/o M s/p Type A Dissection repair. Pt seen resting OOB in chair. He reports a good appetite, but decreased intake 2/2 dislike of hospital food. Recalls consuming coffee and bites of breakfast burrito this am. Encouraged preferences and increased needs post-op. Denies N/V/D/C, pain, and mechanical issues with po intake. Last BM today. PTA pt reports eating well. He endorses starting intermittent fasting for wt loss, but so far without wt changes per UBW. He also recalls eating balanced meals consisting of fruits/veggies/whole grains/lean protein. Reinforced a heart healthy diet post-op. RD to follow per policy.

## 2019-05-15 LAB
ALBUMIN SERPL ELPH-MCNC: 2.9 G/DL — LOW (ref 3.3–5)
ALP SERPL-CCNC: 60 U/L — SIGNIFICANT CHANGE UP (ref 40–120)
ALT FLD-CCNC: 37 U/L — SIGNIFICANT CHANGE UP (ref 10–45)
ANION GAP SERPL CALC-SCNC: 11 MMOL/L — SIGNIFICANT CHANGE UP (ref 5–17)
ANION GAP SERPL CALC-SCNC: 13 MMOL/L — SIGNIFICANT CHANGE UP (ref 5–17)
APTT BLD: 34.2 SEC — SIGNIFICANT CHANGE UP (ref 27.5–36.3)
APTT BLD: 35.5 SEC — SIGNIFICANT CHANGE UP (ref 27.5–36.3)
AST SERPL-CCNC: 42 U/L — HIGH (ref 10–40)
BILIRUB SERPL-MCNC: 0.2 MG/DL — SIGNIFICANT CHANGE UP (ref 0.2–1.2)
BUN SERPL-MCNC: 15 MG/DL — SIGNIFICANT CHANGE UP (ref 7–23)
BUN SERPL-MCNC: 17 MG/DL — SIGNIFICANT CHANGE UP (ref 7–23)
CALCIUM SERPL-MCNC: 9 MG/DL — SIGNIFICANT CHANGE UP (ref 8.4–10.5)
CALCIUM SERPL-MCNC: 9.2 MG/DL — SIGNIFICANT CHANGE UP (ref 8.4–10.5)
CHLORIDE SERPL-SCNC: 95 MMOL/L — LOW (ref 96–108)
CHLORIDE SERPL-SCNC: 96 MMOL/L — SIGNIFICANT CHANGE UP (ref 96–108)
CO2 SERPL-SCNC: 28 MMOL/L — SIGNIFICANT CHANGE UP (ref 22–31)
CO2 SERPL-SCNC: 29 MMOL/L — SIGNIFICANT CHANGE UP (ref 22–31)
CREAT SERPL-MCNC: 1.14 MG/DL — SIGNIFICANT CHANGE UP (ref 0.5–1.3)
CREAT SERPL-MCNC: 1.14 MG/DL — SIGNIFICANT CHANGE UP (ref 0.5–1.3)
GLUCOSE BLDC GLUCOMTR-MCNC: 105 MG/DL — HIGH (ref 70–99)
GLUCOSE BLDC GLUCOMTR-MCNC: 106 MG/DL — HIGH (ref 70–99)
GLUCOSE BLDC GLUCOMTR-MCNC: 115 MG/DL — HIGH (ref 70–99)
GLUCOSE BLDC GLUCOMTR-MCNC: 116 MG/DL — HIGH (ref 70–99)
GLUCOSE SERPL-MCNC: 106 MG/DL — HIGH (ref 70–99)
GLUCOSE SERPL-MCNC: 108 MG/DL — HIGH (ref 70–99)
HCT VFR BLD CALC: 27 % — LOW (ref 39–50)
HCT VFR BLD CALC: 30.7 % — LOW (ref 39–50)
HGB BLD-MCNC: 8.5 G/DL — LOW (ref 13–17)
HGB BLD-MCNC: 9.8 G/DL — LOW (ref 13–17)
INR BLD: 1.31 — HIGH (ref 0.88–1.16)
INR BLD: 1.31 — HIGH (ref 0.88–1.16)
MAGNESIUM SERPL-MCNC: 2.5 MG/DL — SIGNIFICANT CHANGE UP (ref 1.6–2.6)
MAGNESIUM SERPL-MCNC: 2.6 MG/DL — SIGNIFICANT CHANGE UP (ref 1.6–2.6)
MCHC RBC-ENTMCNC: 21.4 PG — LOW (ref 27–34)
MCHC RBC-ENTMCNC: 22 PG — LOW (ref 27–34)
MCHC RBC-ENTMCNC: 31.5 GM/DL — LOW (ref 32–36)
MCHC RBC-ENTMCNC: 31.9 GM/DL — LOW (ref 32–36)
MCV RBC AUTO: 67.8 FL — LOW (ref 80–100)
MCV RBC AUTO: 68.8 FL — LOW (ref 80–100)
NRBC # BLD: 0 /100 WBCS — SIGNIFICANT CHANGE UP (ref 0–0)
NRBC # BLD: 0 /100 WBCS — SIGNIFICANT CHANGE UP (ref 0–0)
PHOSPHATE SERPL-MCNC: 3.4 MG/DL — SIGNIFICANT CHANGE UP (ref 2.5–4.5)
PLATELET # BLD AUTO: 290 K/UL — SIGNIFICANT CHANGE UP (ref 150–400)
PLATELET # BLD AUTO: 317 K/UL — SIGNIFICANT CHANGE UP (ref 150–400)
POTASSIUM SERPL-MCNC: 3.8 MMOL/L — SIGNIFICANT CHANGE UP (ref 3.5–5.3)
POTASSIUM SERPL-MCNC: 4.1 MMOL/L — SIGNIFICANT CHANGE UP (ref 3.5–5.3)
POTASSIUM SERPL-SCNC: 3.8 MMOL/L — SIGNIFICANT CHANGE UP (ref 3.5–5.3)
POTASSIUM SERPL-SCNC: 4.1 MMOL/L — SIGNIFICANT CHANGE UP (ref 3.5–5.3)
PROT SERPL-MCNC: 6.8 G/DL — SIGNIFICANT CHANGE UP (ref 6–8.3)
PROTHROM AB SERPL-ACNC: 14.9 SEC — HIGH (ref 10–12.9)
PROTHROM AB SERPL-ACNC: 14.9 SEC — HIGH (ref 10–12.9)
RBC # BLD: 3.98 M/UL — LOW (ref 4.2–5.8)
RBC # BLD: 4.46 M/UL — SIGNIFICANT CHANGE UP (ref 4.2–5.8)
RBC # FLD: 16.8 % — HIGH (ref 10.3–14.5)
RBC # FLD: 16.9 % — HIGH (ref 10.3–14.5)
SODIUM SERPL-SCNC: 135 MMOL/L — SIGNIFICANT CHANGE UP (ref 135–145)
SODIUM SERPL-SCNC: 137 MMOL/L — SIGNIFICANT CHANGE UP (ref 135–145)
WBC # BLD: 10.5 K/UL — SIGNIFICANT CHANGE UP (ref 3.8–10.5)
WBC # BLD: 9.66 K/UL — SIGNIFICANT CHANGE UP (ref 3.8–10.5)
WBC # FLD AUTO: 10.5 K/UL — SIGNIFICANT CHANGE UP (ref 3.8–10.5)
WBC # FLD AUTO: 9.66 K/UL — SIGNIFICANT CHANGE UP (ref 3.8–10.5)

## 2019-05-15 PROCEDURE — 99232 SBSQ HOSP IP/OBS MODERATE 35: CPT

## 2019-05-15 PROCEDURE — 71045 X-RAY EXAM CHEST 1 VIEW: CPT | Mod: 26

## 2019-05-15 PROCEDURE — ZZZZZ: CPT

## 2019-05-15 PROCEDURE — 99254 IP/OBS CNSLTJ NEW/EST MOD 60: CPT

## 2019-05-15 RX ORDER — FUROSEMIDE 40 MG
40 TABLET ORAL EVERY 12 HOURS
Refills: 0 | Status: DISCONTINUED | OUTPATIENT
Start: 2019-05-15 | End: 2019-05-17

## 2019-05-15 RX ORDER — DILTIAZEM HCL 120 MG
5 CAPSULE, EXT RELEASE 24 HR ORAL ONCE
Refills: 0 | Status: COMPLETED | OUTPATIENT
Start: 2019-05-15 | End: 2019-05-15

## 2019-05-15 RX ORDER — SODIUM CHLORIDE 9 MG/ML
3 INJECTION INTRAMUSCULAR; INTRAVENOUS; SUBCUTANEOUS EVERY 8 HOURS
Refills: 0 | Status: DISCONTINUED | OUTPATIENT
Start: 2019-05-15 | End: 2019-05-17

## 2019-05-15 RX ORDER — APIXABAN 2.5 MG/1
5 TABLET, FILM COATED ORAL EVERY 12 HOURS
Refills: 0 | Status: DISCONTINUED | OUTPATIENT
Start: 2019-05-15 | End: 2019-05-16

## 2019-05-15 RX ORDER — AMIODARONE HYDROCHLORIDE 400 MG/1
150 TABLET ORAL ONCE
Refills: 0 | Status: COMPLETED | OUTPATIENT
Start: 2019-05-15 | End: 2019-05-15

## 2019-05-15 RX ORDER — POTASSIUM CHLORIDE 20 MEQ
20 PACKET (EA) ORAL
Refills: 0 | Status: COMPLETED | OUTPATIENT
Start: 2019-05-15 | End: 2019-05-15

## 2019-05-15 RX ORDER — ASPIRIN/CALCIUM CARB/MAGNESIUM 324 MG
81 TABLET ORAL DAILY
Refills: 0 | Status: DISCONTINUED | OUTPATIENT
Start: 2019-05-15 | End: 2019-05-17

## 2019-05-15 RX ORDER — MAGNESIUM SULFATE 500 MG/ML
2 VIAL (ML) INJECTION ONCE
Refills: 0 | Status: COMPLETED | OUTPATIENT
Start: 2019-05-15 | End: 2019-05-15

## 2019-05-15 RX ADMIN — Medication 20 MILLIEQUIVALENT(S): at 17:37

## 2019-05-15 RX ADMIN — LIDOCAINE 1 PATCH: 4 CREAM TOPICAL at 01:41

## 2019-05-15 RX ADMIN — LIDOCAINE 1 PATCH: 4 CREAM TOPICAL at 19:18

## 2019-05-15 RX ADMIN — AMIODARONE HYDROCHLORIDE 400 MILLIGRAM(S): 400 TABLET ORAL at 13:54

## 2019-05-15 RX ADMIN — Medication 100 MILLIGRAM(S): at 06:50

## 2019-05-15 RX ADMIN — Medication 81 MILLIGRAM(S): at 11:05

## 2019-05-15 RX ADMIN — Medication 3 MILLILITER(S): at 11:05

## 2019-05-15 RX ADMIN — Medication 50 MILLIEQUIVALENT(S): at 04:00

## 2019-05-15 RX ADMIN — Medication 100 MILLIGRAM(S): at 17:37

## 2019-05-15 RX ADMIN — AMIODARONE HYDROCHLORIDE 600 MILLIGRAM(S): 400 TABLET ORAL at 09:30

## 2019-05-15 RX ADMIN — OXYCODONE AND ACETAMINOPHEN 1 TABLET(S): 5; 325 TABLET ORAL at 22:13

## 2019-05-15 RX ADMIN — LIDOCAINE 1 PATCH: 4 CREAM TOPICAL at 23:08

## 2019-05-15 RX ADMIN — Medication 5 MILLIGRAM(S): at 11:24

## 2019-05-15 RX ADMIN — AMLODIPINE BESYLATE 5 MILLIGRAM(S): 2.5 TABLET ORAL at 05:15

## 2019-05-15 RX ADMIN — SODIUM CHLORIDE 3 MILLILITER(S): 9 INJECTION INTRAMUSCULAR; INTRAVENOUS; SUBCUTANEOUS at 21:34

## 2019-05-15 RX ADMIN — Medication 3 MILLILITER(S): at 17:38

## 2019-05-15 RX ADMIN — Medication 40 MILLIGRAM(S): at 15:26

## 2019-05-15 RX ADMIN — AMIODARONE HYDROCHLORIDE 400 MILLIGRAM(S): 400 TABLET ORAL at 05:15

## 2019-05-15 RX ADMIN — PANTOPRAZOLE SODIUM 40 MILLIGRAM(S): 20 TABLET, DELAYED RELEASE ORAL at 06:50

## 2019-05-15 RX ADMIN — Medication 3 MILLILITER(S): at 23:20

## 2019-05-15 RX ADMIN — LIDOCAINE 1 PATCH: 4 CREAM TOPICAL at 11:05

## 2019-05-15 RX ADMIN — APIXABAN 5 MILLIGRAM(S): 2.5 TABLET, FILM COATED ORAL at 11:07

## 2019-05-15 RX ADMIN — OXYCODONE AND ACETAMINOPHEN 1 TABLET(S): 5; 325 TABLET ORAL at 21:43

## 2019-05-15 RX ADMIN — Medication 40 MILLIGRAM(S): at 05:16

## 2019-05-15 RX ADMIN — Medication 50 MILLIEQUIVALENT(S): at 02:00

## 2019-05-15 RX ADMIN — HEPARIN SODIUM 7500 UNIT(S): 5000 INJECTION INTRAVENOUS; SUBCUTANEOUS at 05:16

## 2019-05-15 RX ADMIN — LISINOPRIL 5 MILLIGRAM(S): 2.5 TABLET ORAL at 05:15

## 2019-05-15 RX ADMIN — CHLORHEXIDINE GLUCONATE 1 APPLICATION(S): 213 SOLUTION TOPICAL at 11:06

## 2019-05-15 RX ADMIN — Medication 3 MILLILITER(S): at 05:38

## 2019-05-15 RX ADMIN — Medication 100 MILLIGRAM(S): at 21:43

## 2019-05-15 RX ADMIN — APIXABAN 5 MILLIGRAM(S): 2.5 TABLET, FILM COATED ORAL at 23:20

## 2019-05-15 RX ADMIN — Medication 100 MILLIGRAM(S): at 05:15

## 2019-05-15 RX ADMIN — AMIODARONE HYDROCHLORIDE 400 MILLIGRAM(S): 400 TABLET ORAL at 21:43

## 2019-05-15 RX ADMIN — Medication 50 GRAM(S): at 01:26

## 2019-05-15 RX ADMIN — Medication 50 MILLIEQUIVALENT(S): at 00:00

## 2019-05-15 RX ADMIN — Medication 20 MILLIEQUIVALENT(S): at 05:14

## 2019-05-15 RX ADMIN — SENNA PLUS 2 TABLET(S): 8.6 TABLET ORAL at 21:43

## 2019-05-15 NOTE — CONSULT NOTE ADULT - ASSESSMENT
42 yo M with no significant PMH, underwent emergent aortic dissection repair type A, noted to have post op atrial flutter, currently on amiodarone and Eliquis (started 5/15/19). 44 yo M with no significant PMH, underwent emergent aortic dissection repair type A, noted to have post op atrial flutter, currently on amiodarone and Eliquis (started 5/15/19). Continue anticoagulation, amiodarone, plan for ROZINA/DCCV in am.

## 2019-05-15 NOTE — PROGRESS NOTE ADULT - SUBJECTIVE AND OBJECTIVE BOX
Patient discussed on morning rounds with Dr. Smith     Operation / Date: 5/9/2019: Type A dissection repair EF 50%    SUBJECTIVE ASSESSMENT:  Pt was seen and examined at the bedside. He states his pain is well controlled at this time. He states that he has had a BM after surgery. Denies headache, dizziness, SOB, FRANCIS, N/V/D/C, abd pain, calf pain, leg swelling       Vital Signs Last 24 Hrs  T(C): 37.6 (15 May 2019 18:00), Max: 37.6 (15 May 2019 18:00)  T(F): 99.6 (15 May 2019 18:00), Max: 99.6 (15 May 2019 18:00)  HR: 104 (15 May 2019 19:00) (80 - 114)  BP: 143/79 (15 May 2019 17:30) (108/69 - 150/80)  BP(mean): 98 (15 May 2019 17:30) (75 - 113)  RR: 17 (15 May 2019 19:00) (16 - 22)  SpO2: 99% (15 May 2019 19:00) (93% - 100%)  I&O's Detail    14 May 2019 07:01  -  15 May 2019 07:00  --------------------------------------------------------  IN:    IV PiggyBack: 350 mL    Oral Fluid: 560 mL    sodium chloride 0.9%: 70 mL  Total IN: 980 mL    OUT:    Voided: 3000 mL  Total OUT: 3000 mL    Total NET: -2020 mL      15 May 2019 07:01  -  15 May 2019 19:17  --------------------------------------------------------  IN:    Oral Fluid: 500 mL    sodium chloride 0.9%: 60 mL  Total IN: 560 mL    OUT:    Voided: 850 mL  Total OUT: 850 mL    Total NET: -290 mL          CHEST TUBE:  No.   VIVIANA DRAIN:  No.  EPICARDIAL WIRES: yes  TIE DOWNS: Yes  SERNA: No.    PHYSICAL EXAM:    General: Lying comfortably in bed, NAD    Neurological: A&O x3, no focal deficits, strength 5/5 throughout, sensation grossly preserved    Cardiovascular: RRR, normal s1 s2, no M/R/G    Respiratory: Non-labored breathing, chest expansion symmetric, CTA b/l, no W/R/R    Gastrointestinal: +BS x4 quadrant, soft, non-tender to palpation, non-distended, no organomegaly    Extremities: WWP, no pitting edema, no calf tenderness or erythema    Vascular: 2+radial pulses b/l, 2+DP pulses b/l    Incision: MDI, CD&I, no discharge, no evidence of dehiscence, no sternal click     LABS:                        9.8    10.50 )-----------( 317      ( 15 May 2019 11:49 )             30.7       COUMADIN:  no    PT/INR - ( 15 May 2019 11:49 )   PT: 14.9 sec;   INR: 1.31          PTT - ( 15 May 2019 11:49 )  PTT:35.5 sec    05-15    135  |  95<L>  |  17  ----------------------------<  108<H>  4.1   |  29  |  1.14    Ca    9.2      15 May 2019 11:49  Phos  3.4     05-15  Mg     2.5     05-15    TPro  6.8  /  Alb  2.9<L>  /  TBili  0.2  /  DBili  x   /  AST  42<H>  /  ALT  37  /  AlkPhos  60  05-15          MEDICATIONS  (STANDING):  ALBUTerol/ipratropium for Nebulization 3 milliLiter(s) Nebulizer every 6 hours  amiodarone    Tablet   Oral   amiodarone    Tablet 400 milliGRAM(s) Oral every 8 hours  amLODIPine   Tablet 5 milliGRAM(s) Oral daily  apixaban 5 milliGRAM(s) Oral every 12 hours  aspirin enteric coated 81 milliGRAM(s) Oral daily  dextrose 5%. 1000 milliLiter(s) (50 mL/Hr) IV Continuous <Continuous>  dextrose 50% Injectable 50 milliLiter(s) IV Push every 15 minutes  dextrose 50% Injectable 25 milliLiter(s) IV Push every 15 minutes  docusate sodium 100 milliGRAM(s) Oral three times a day  furosemide   Injectable 40 milliGRAM(s) IV Push every 12 hours  insulin lispro (HumaLOG) corrective regimen sliding scale   SubCutaneous Before meals and at bedtime  lidocaine   Patch 1 Patch Transdermal daily  lisinopril 5 milliGRAM(s) Oral daily  metoprolol tartrate 100 milliGRAM(s) Oral two times a day  pantoprazole    Tablet 40 milliGRAM(s) Oral before breakfast  potassium chloride    Tablet ER 20 milliEquivalent(s) Oral two times a day  senna 2 Tablet(s) Oral at bedtime  sodium chloride 0.9% lock flush 3 milliLiter(s) IV Push every 8 hours    MEDICATIONS  (PRN):  acetaminophen   Tablet .. 650 milliGRAM(s) Oral every 6 hours PRN Mild Pain (1 - 3)  dextrose 40% Gel 15 Gram(s) Oral once PRN Blood Glucose LESS THAN 70 milliGRAM(s)/deciliter  glucagon  Injectable 1 milliGRAM(s) IntraMuscular once PRN Glucose LESS THAN 70 milligrams/deciliter  oxyCODONE    5 mG/acetaminophen 325 mG 1 Tablet(s) Oral every 6 hours PRN Moderate Pain (4 - 6)  polyethylene glycol 3350 17 Gram(s) Oral daily PRN Constipation        RADIOLOGY & ADDITIONAL TESTS:    CXR: possible b/l effusions vs atelectasis

## 2019-05-15 NOTE — PROGRESS NOTE ADULT - ASSESSMENT
43 year old male with no known medical history who presented to [place] ED on 5/9/19 with acute onset substernal chest pain radiating to back with associated dizziness and b/l upper extremities paresthesia.  CTA C/A/P which demonstrated acute Type A dissection and was transferred to [facility] emergently. On 5/9/2019 he underwent surgical repair of type A dissection with Dr. Smith. intraoperatively he was transfused 6FFP, 2 platelets, 10 of cryo. Postoperatively he was brought to the CTICU and extubated. Post-operative course was complicated by paroxysmal Afib and pt was started on amio, beta blocker and noac. Ep was consulted. Today on POD 6 he was transferred to 9 [facility] stepdown. Tomorrow he is planned for ROZINA and cardioversion.    Neuro: Pain well controlled with current regimen  - No delirium  - Mentating well  - Tylenol 650 PRN for mild pain   - Oxycodone 5/325 PRN for severe pain    Respiratory: saturating well on room air  - CXR showing possible atelectasis vs effusion, diuresis with 40mg IV lasix BID  - Encourage ambulation C+DB and use of IS 10x / hr while awake  - Continue albuterol/ipatropium  nuebulizer q6h    Cardiac: s/p Type A dissection repair   - EF 50%  - Pt in afib with Hr 80-110s. Continue metoprolol 100mg BID, amiodarone load, and Eliquis 5mg  - EP following, will undergo ROZINA/DCCV tomorrow   - Continue amlodipine 5mg for BP control  - Continue ASA 81mg    GI:   - Continue bowel regimen  - GI PPX with protonix 40mg daily    Renal/: TYRESE   - BUN/Cr (17/1.14)  - Monitor renal function  - Monitor I/Os  - Diuresis with lasix 40mg IV BID  - Standing K supplementation ordered   - Replete K<4.0, Mg<2.0    Heme: (30.7/9.8)  - H&H stable, continue to monitor   - DVT ppx with 5000u SQH    Endocrinology  - A1C 5.4  - Blood sugar well controlled on BMP, continue to monitor, No ISS at this time  - f/u TSH     ID: afebrile WBC (10.5)  - Observe for SIRS/Sepsis Syndrome    Dispo: Home when medically cleared

## 2019-05-15 NOTE — CONSULT NOTE ADULT - SUBJECTIVE AND OBJECTIVE BOX
Electrophysiology Consult Note:     CHIEF COMPLAINT:  Patient is a 43y old  Male who presents with a chief complaint of Type A Aortic Dissection (15 May 2019 12:30)        HISTORY OF PRESENT ILLNESS:   HPI:   43 year old male with no known medical history who presented to [place] ED on 5/9/19 with acute onset substernal chest pain radiating to back with associated dizziness and b/l upper extremities paresthesia.    After he  underwent CTA C/A/P which demonstrated acute Type A dissection and was transferred to [facility] emergently to undergo surgical repair with Dr. Smith. Patient had procedure done on 5/9/19, he was noted to have post op A.fib/flutter, was started on amiodarone, he self converted to sinus rhythm. Patient was maintained on amiodarone but on 5/14/19 he reverted to atrial flutter. Eliquis was started on 5/15/19  EPS called to evaluate.         PAST MEDICAL & SURGICAL HISTORY:  No pertinent past medical history  No significant past surgical history      FAMILY HISTORY:  No pertinent family history in first degree relatives      SOCIAL HISTORY:    [x ] Non-smoker    Allergies    No Known Allergies    Intolerances    	    MEDICATIONS:  MEDICATIONS  (STANDING):  ALBUTerol/ipratropium for Nebulization 3 milliLiter(s) Nebulizer every 6 hours  amiodarone    Tablet   Oral   amiodarone    Tablet 400 milliGRAM(s) Oral every 8 hours  amLODIPine   Tablet 5 milliGRAM(s) Oral daily  apixaban 5 milliGRAM(s) Oral every 12 hours  aspirin enteric coated 81 milliGRAM(s) Oral daily  chlorhexidine 2% Cloths 1 Application(s) Topical daily  dextrose 5%. 1000 milliLiter(s) (50 mL/Hr) IV Continuous <Continuous>  dextrose 50% Injectable 50 milliLiter(s) IV Push every 15 minutes  dextrose 50% Injectable 25 milliLiter(s) IV Push every 15 minutes  docusate sodium 100 milliGRAM(s) Oral three times a day  furosemide   Injectable 40 milliGRAM(s) IV Push every 12 hours  insulin lispro (HumaLOG) corrective regimen sliding scale   SubCutaneous Before meals and at bedtime  lidocaine   Patch 1 Patch Transdermal daily  lisinopril 5 milliGRAM(s) Oral daily  metoprolol tartrate 100 milliGRAM(s) Oral two times a day  pantoprazole    Tablet 40 milliGRAM(s) Oral before breakfast  potassium chloride    Tablet ER 20 milliEquivalent(s) Oral two times a day  senna 2 Tablet(s) Oral at bedtime  sodium chloride 0.9%. 1000 milliLiter(s) (10 mL/Hr) IV Continuous <Continuous>    MEDICATIONS  (PRN):  acetaminophen   Tablet .. 650 milliGRAM(s) Oral every 6 hours PRN Mild Pain (1 - 3)  dextrose 40% Gel 15 Gram(s) Oral once PRN Blood Glucose LESS THAN 70 milliGRAM(s)/deciliter  glucagon  Injectable 1 milliGRAM(s) IntraMuscular once PRN Glucose LESS THAN 70 milligrams/deciliter  oxyCODONE    5 mG/acetaminophen 325 mG 1 Tablet(s) Oral every 6 hours PRN Moderate Pain (4 - 6)  polyethylene glycol 3350 17 Gram(s) Oral daily PRN Constipation        REVIEW OF SYSTEMS:  CONSTITUTIONAL: No fever, weight loss, or fatigue  EYES: No eye pain, visual disturbances, or discharge  ENMT:  No difficulty hearing, tinnitus, vertigo; No sinus or throat pain  NECK: No pain or stiffness  RESPIRATORY: No cough, wheezing, chills or hemoptysis; No Shortness of Breath  CARDIOVASCULAR: No chest pain, palpitations, dizziness, or leg swelling  GASTROINTESTINAL: No abdominal or epigastric pain. No nausea, vomiting, or hematemesis; No diarrhea or constipation.   GENITOURINARY: No dysuria, frequency, hematuria, or incontinence  NEUROLOGICAL: No headaches, memory loss, loss of strength, numbness, or tremors  SKIN: No itching, burning, rashes, or lesions   LYMPH Nodes: No enlarged glands  ENDOCRINE: No heat or cold intolerance; No hair loss  MUSCULOSKELETAL: No joint pain or swelling; No muscle, back, or extremity pain    PHYSICAL EXAM:  Vital Signs Last 24 Hrs  T(C): 36.6 (15 May 2019 13:24), Max: 37.6 (14 May 2019 17:51)  T(F): 97.8 (15 May 2019 13:24), Max: 99.6 (14 May 2019 17:51)  HR: 110 (15 May 2019 14:00) (76 - 110)  BP: 127/75 (15 May 2019 14:00) (108/69 - 159/83)  BP(mean): 93 (15 May 2019 08:00) (75 - 113)  RR: 18 (15 May 2019 14:00) (16 - 22)  SpO2: 98% (15 May 2019 14:00) (93% - 100%)  Daily     Daily     Constitutional: NAD	  HEENT:  PERRL, EOMI	  CVS: Normal S1 S2, irregular no  edema  Pulm: Lungs clear to auscultation	  GI:  Soft, Non-tender, + BS	  Ext: no edema  Vascular: Peripheral pulses palpable   MS: full range of motion in all joints  Neurologic: A&O x 3  Skin: No rash or lesion       	  LABS:	                         9.8    10.50 )-----------( 317      ( 15 May 2019 11:49 )             30.7     05-15    135  |  95<L>  |  17  ----------------------------<  108<H>  4.1   |  29  |  1.14    Ca    9.2      15 May 2019 11:49  Phos  3.4     05-15  Mg     2.5     05-15    TPro  6.8  /  Alb  2.9<L>  /  TBili  0.2  /  DBili  x   /  AST  42<H>  /  ALT  37  /  AlkPhos  60  05-15    proBNP:   Lipid Profile:   HgA1c:   TSH: 	      EKG: < from: 12 Lead ECG (05.10.19 @ 00:43) >  Ventricular Rate 80 BPM    Atrial Rate 80 BPM    P-R Interval 210 ms    QRS Duration 98 ms    Q-T Interval 402 ms    QTC Calculation(Bezet) 463 ms    P Axis 52 degrees    R Axis 67 degrees    T Axis -44 degrees    Diagnosis Line Sinus rhythm with 1st degree AV block  Nonspecific ST - T abnormalities    Telemetry: sinus rhythm -> atrial flutter     Echo: < from: Echocardiogram (05.14.19 @ 16:22) >  Mild to moderate concentric left ventricular hypertrophy.Abnormal   (paradoxical)   septal motion consistent with abnormal conductionThe left ventricular   ejection   fraction is normal.The left ventricular ejection fraction is 60%.The   right   ventricle is normal in size and function.The left atrial size is   normal.Right   atrial size is normal.No evidence for any hemodynamically significant   valvular   disease.There was insufficient TR detected from which to calculate   pulmonary   artery systolic pressure.  There is no pericardial effusion.Mild aortic   root   dilatation.The aortic root measures 4.2 cm at sinuses (normal less than   4 cm   for men, less than 3.6 cm for women).

## 2019-05-15 NOTE — PROGRESS NOTE ADULT - SUBJECTIVE AND OBJECTIVE BOX
CTICU  CRITICAL  CARE  attending     Hand off received 					   Pertinent clinical, laboratory, radiographic, hemodynamic, echocardiographic, respiratory data, microbiologic data and chart were reviewed and analyzed frequently throughout the course of the day and night  Patient seen and examined with CTS/ SH attending at bedside    Pt is a 43y , Male,  post op day # 5 s/p emergent repair of type A aortic dissection;    today:    hypoxemia on supplemental O2  hemodynamically stable        , FAMILY HISTORY:  No pertinent family history in first degree relatives  PAST MEDICAL & SURGICAL HISTORY:  No pertinent past medical history  No significant past surgical history    Patient is a 43y old  Male who presents with a chief complaint of Type A Aortic Dissection (14 May 2019 13:09)      14 system review was unremarkable  acute changes include acute respiratory failure  Vital signs, hemodynamic and respiratory parameters were reviewed from the bedside nursing flowsheet.  ICU Vital Signs Last 24 Hrs  T(C): 36.7 (15 May 2019 10:14), Max: 37.6 (14 May 2019 17:51)  T(F): 98 (15 May 2019 10:14), Max: 99.6 (14 May 2019 17:51)  HR: 99 (15 May 2019 12:00) (76 - 110)  BP: 129/76 (15 May 2019 12:00) (108/69 - 159/83)  BP(mean): 93 (15 May 2019 08:00) (75 - 113)  ABP: --  ABP(mean): --  RR: 18 (15 May 2019 12:00) (16 - 22)  SpO2: 97% (15 May 2019 12:00) (93% - 100%)    Adult Advanced Hemodynamics Last 24 Hrs  CVP(mm Hg): --  CVP(cm H2O): --  CO: --  CI: --  PA: --  PA(mean): --  PCWP: --  SVR: --  SVRI: --  PVR: --  PVRI: --,     Intake and output was reviewed and the fluid balance was calculated  Daily     Daily   I&O's Summary    14 May 2019 07:01  -  15 May 2019 07:00  --------------------------------------------------------  IN: 980 mL / OUT: 3000 mL / NET: -2020 mL    15 May 2019 07:01  -  15 May 2019 12:30  --------------------------------------------------------  IN: 40 mL / OUT: 300 mL / NET: -260 mL        All lines and drain sites were assessed  Glycemic trend was reviewedCAPILLARY BLOOD GLUCOSE      POCT Blood Glucose.: 105 mg/dL (15 May 2019 11:20)    No acute change in mental status  Auscultation of the chest reveals equal bs  Abdomen is soft  Extremities are warm and well perfused  Wounds appear clean and unremarkable  Antibiotics are periop    labs  CBC Full  -  ( 15 May 2019 11:49 )  WBC Count : 10.50 K/uL  RBC Count : 4.46 M/uL  Hemoglobin : 9.8 g/dL  Hematocrit : 30.7 %  Platelet Count - Automated : 317 K/uL  Mean Cell Volume : 68.8 fl  Mean Cell Hemoglobin : 22.0 pg  Mean Cell Hemoglobin Concentration : 31.9 gm/dL  Auto Neutrophil # : x  Auto Lymphocyte # : x  Auto Monocyte # : x  Auto Eosinophil # : x  Auto Basophil # : x  Auto Neutrophil % : x  Auto Lymphocyte % : x  Auto Monocyte % : x  Auto Eosinophil % : x  Auto Basophil % : x    05-15    135  |  95<L>  |  17  ----------------------------<  108<H>  4.1   |  29  |  1.14    Ca    9.2      15 May 2019 11:49  Phos  3.4     05-15  Mg     2.5     05-15    TPro  6.8  /  Alb  2.9<L>  /  TBili  0.2  /  DBili  x   /  AST  42<H>  /  ALT  37  /  AlkPhos  60  05-15    PT/INR - ( 15 May 2019 11:49 )   PT: 14.9 sec;   INR: 1.31          PTT - ( 15 May 2019 11:49 )  PTT:35.5 sec  The current medications were reviewed   MEDICATIONS  (STANDING):  ALBUTerol/ipratropium for Nebulization 3 milliLiter(s) Nebulizer every 6 hours  amiodarone    Tablet   Oral   amiodarone    Tablet 400 milliGRAM(s) Oral every 8 hours  amLODIPine   Tablet 5 milliGRAM(s) Oral daily  apixaban 5 milliGRAM(s) Oral every 12 hours  aspirin enteric coated 81 milliGRAM(s) Oral daily  chlorhexidine 2% Cloths 1 Application(s) Topical daily  dextrose 5%. 1000 milliLiter(s) (50 mL/Hr) IV Continuous <Continuous>  dextrose 50% Injectable 50 milliLiter(s) IV Push every 15 minutes  dextrose 50% Injectable 25 milliLiter(s) IV Push every 15 minutes  docusate sodium 100 milliGRAM(s) Oral three times a day  furosemide   Injectable 40 milliGRAM(s) IV Push every 12 hours  insulin lispro (HumaLOG) corrective regimen sliding scale   SubCutaneous Before meals and at bedtime  lidocaine   Patch 1 Patch Transdermal daily  lisinopril 5 milliGRAM(s) Oral daily  metoprolol tartrate 100 milliGRAM(s) Oral two times a day  pantoprazole    Tablet 40 milliGRAM(s) Oral before breakfast  potassium chloride    Tablet ER 20 milliEquivalent(s) Oral two times a day  senna 2 Tablet(s) Oral at bedtime  sodium chloride 0.9%. 1000 milliLiter(s) (10 mL/Hr) IV Continuous <Continuous>    MEDICATIONS  (PRN):  acetaminophen   Tablet .. 650 milliGRAM(s) Oral every 6 hours PRN Mild Pain (1 - 3)  dextrose 40% Gel 15 Gram(s) Oral once PRN Blood Glucose LESS THAN 70 milliGRAM(s)/deciliter  glucagon  Injectable 1 milliGRAM(s) IntraMuscular once PRN Glucose LESS THAN 70 milligrams/deciliter  oxyCODONE    5 mG/acetaminophen 325 mG 1 Tablet(s) Oral every 6 hours PRN Moderate Pain (4 - 6)  polyethylene glycol 3350 17 Gram(s) Oral daily PRN Constipation       PROBLEM LIST/ ASSESSMENT:  HEALTH ISSUES - PROBLEM Dx:    acute post H'gic anemia  post operative hypoxemia  Type A aortic dissection  s/p dissection repair; emergent        ,   Patient is a 43y old  Male who presents with a chief complaint of Type A Aortic Dissection (14 May 2019 13:09)     s/p emergent repair of type A aortic dissection;      My plan includes :  close hemodynamic, ventilatory and drain monitoring and management per post op routine    Monitor for arrhythmias and monitor parameters for organ perfusion  monitor neurologic status  Head of the bed should remain elevated to 45 deg .   chest PT and IS will be encouraged  monitor adequacy of oxygenation and ventilation and attempt to wean oxygen  Nutritional goals will be met using po eventually , ensure adequate caloric intake and montior the same  Stress ulcer and VTE prophylaxis will be achieved    Glycemic control is satisfactory  Electrolytes have been repleted as necessary and wound care has been carried out. Pain control has been achieved.   agressive physical therapy and early mobility and ambulation goals will be met   The family was updated about the course and plan  CRITICAL CARE TIME SPENT in evaluation and management, reassessments, review and interpretation of labs and x-rays, ventilator and hemodynamic management, formulating a plan and coordinating care: __30____ MIN.  Time does not include procedural time.  CTICU ATTENDING     					    Rolly Luz MD

## 2019-05-16 ENCOUNTER — TRANSCRIPTION ENCOUNTER (OUTPATIENT)
Age: 44
End: 2019-05-16

## 2019-05-16 LAB
ANION GAP SERPL CALC-SCNC: 12 MMOL/L — SIGNIFICANT CHANGE UP (ref 5–17)
BASOPHILS # BLD AUTO: 0.05 K/UL — SIGNIFICANT CHANGE UP (ref 0–0.2)
BASOPHILS NFR BLD AUTO: 0.5 % — SIGNIFICANT CHANGE UP (ref 0–2)
BUN SERPL-MCNC: 20 MG/DL — SIGNIFICANT CHANGE UP (ref 7–23)
CALCIUM SERPL-MCNC: 9.3 MG/DL — SIGNIFICANT CHANGE UP (ref 8.4–10.5)
CHLORIDE SERPL-SCNC: 95 MMOL/L — LOW (ref 96–108)
CO2 SERPL-SCNC: 28 MMOL/L — SIGNIFICANT CHANGE UP (ref 22–31)
CREAT SERPL-MCNC: 1.31 MG/DL — HIGH (ref 0.5–1.3)
EOSINOPHIL # BLD AUTO: 0.19 K/UL — SIGNIFICANT CHANGE UP (ref 0–0.5)
EOSINOPHIL NFR BLD AUTO: 1.8 % — SIGNIFICANT CHANGE UP (ref 0–6)
GLUCOSE BLDC GLUCOMTR-MCNC: 100 MG/DL — HIGH (ref 70–99)
GLUCOSE SERPL-MCNC: 111 MG/DL — HIGH (ref 70–99)
HCT VFR BLD CALC: 29.5 % — LOW (ref 39–50)
HGB BLD-MCNC: 9.4 G/DL — LOW (ref 13–17)
IMM GRANULOCYTES NFR BLD AUTO: 2.6 % — HIGH (ref 0–1.5)
LYMPHOCYTES # BLD AUTO: 1.77 K/UL — SIGNIFICANT CHANGE UP (ref 1–3.3)
LYMPHOCYTES # BLD AUTO: 16.6 % — SIGNIFICANT CHANGE UP (ref 13–44)
MAGNESIUM SERPL-MCNC: 2.3 MG/DL — SIGNIFICANT CHANGE UP (ref 1.6–2.6)
MCHC RBC-ENTMCNC: 22 PG — LOW (ref 27–34)
MCHC RBC-ENTMCNC: 31.9 GM/DL — LOW (ref 32–36)
MCV RBC AUTO: 68.9 FL — LOW (ref 80–100)
MONOCYTES # BLD AUTO: 1.19 K/UL — HIGH (ref 0–0.9)
MONOCYTES NFR BLD AUTO: 11.2 % — SIGNIFICANT CHANGE UP (ref 2–14)
NEUTROPHILS # BLD AUTO: 7.16 K/UL — SIGNIFICANT CHANGE UP (ref 1.8–7.4)
NEUTROPHILS NFR BLD AUTO: 67.3 % — SIGNIFICANT CHANGE UP (ref 43–77)
NRBC # BLD: 0 /100 WBCS — SIGNIFICANT CHANGE UP (ref 0–0)
PLATELET # BLD AUTO: 382 K/UL — SIGNIFICANT CHANGE UP (ref 150–400)
POTASSIUM SERPL-MCNC: 3.9 MMOL/L — SIGNIFICANT CHANGE UP (ref 3.5–5.3)
POTASSIUM SERPL-SCNC: 3.9 MMOL/L — SIGNIFICANT CHANGE UP (ref 3.5–5.3)
RBC # BLD: 4.28 M/UL — SIGNIFICANT CHANGE UP (ref 4.2–5.8)
RBC # FLD: 16.9 % — HIGH (ref 10.3–14.5)
SODIUM SERPL-SCNC: 135 MMOL/L — SIGNIFICANT CHANGE UP (ref 135–145)
TSH SERPL-MCNC: 2.61 UIU/ML — SIGNIFICANT CHANGE UP (ref 0.35–4.94)
WBC # BLD: 10.64 K/UL — HIGH (ref 3.8–10.5)
WBC # FLD AUTO: 10.64 K/UL — HIGH (ref 3.8–10.5)

## 2019-05-16 PROCEDURE — 92960 CARDIOVERSION ELECTRIC EXT: CPT

## 2019-05-16 PROCEDURE — 71045 X-RAY EXAM CHEST 1 VIEW: CPT | Mod: 26

## 2019-05-16 PROCEDURE — 93320 DOPPLER ECHO COMPLETE: CPT | Mod: 26

## 2019-05-16 PROCEDURE — 93325 DOPPLER ECHO COLOR FLOW MAPG: CPT | Mod: 26

## 2019-05-16 PROCEDURE — 93312 ECHO TRANSESOPHAGEAL: CPT | Mod: 26

## 2019-05-16 RX ORDER — RIVAROXABAN 15 MG-20MG
20 KIT ORAL EVERY 24 HOURS
Refills: 0 | Status: DISCONTINUED | OUTPATIENT
Start: 2019-05-17 | End: 2019-05-17

## 2019-05-16 RX ORDER — APIXABAN 2.5 MG/1
1 TABLET, FILM COATED ORAL
Qty: 60 | Refills: 0
Start: 2019-05-16 | End: 2019-06-14

## 2019-05-16 RX ORDER — RIVAROXABAN 15 MG-20MG
1 KIT ORAL
Qty: 30 | Refills: 0
Start: 2019-05-16 | End: 2019-06-14

## 2019-05-16 RX ADMIN — SODIUM CHLORIDE 3 MILLILITER(S): 9 INJECTION INTRAMUSCULAR; INTRAVENOUS; SUBCUTANEOUS at 22:32

## 2019-05-16 RX ADMIN — AMIODARONE HYDROCHLORIDE 400 MILLIGRAM(S): 400 TABLET ORAL at 21:56

## 2019-05-16 RX ADMIN — AMIODARONE HYDROCHLORIDE 400 MILLIGRAM(S): 400 TABLET ORAL at 14:13

## 2019-05-16 RX ADMIN — Medication 81 MILLIGRAM(S): at 14:12

## 2019-05-16 RX ADMIN — Medication 40 MILLIGRAM(S): at 04:19

## 2019-05-16 RX ADMIN — Medication 3 MILLILITER(S): at 17:15

## 2019-05-16 RX ADMIN — Medication 3 MILLILITER(S): at 05:39

## 2019-05-16 RX ADMIN — Medication 3 MILLILITER(S): at 14:12

## 2019-05-16 RX ADMIN — Medication 20 MILLIEQUIVALENT(S): at 15:19

## 2019-05-16 RX ADMIN — LISINOPRIL 5 MILLIGRAM(S): 2.5 TABLET ORAL at 05:39

## 2019-05-16 RX ADMIN — LIDOCAINE 1 PATCH: 4 CREAM TOPICAL at 18:22

## 2019-05-16 RX ADMIN — SODIUM CHLORIDE 3 MILLILITER(S): 9 INJECTION INTRAMUSCULAR; INTRAVENOUS; SUBCUTANEOUS at 05:13

## 2019-05-16 RX ADMIN — Medication 100 MILLIGRAM(S): at 14:13

## 2019-05-16 RX ADMIN — APIXABAN 5 MILLIGRAM(S): 2.5 TABLET, FILM COATED ORAL at 17:15

## 2019-05-16 RX ADMIN — AMIODARONE HYDROCHLORIDE 400 MILLIGRAM(S): 400 TABLET ORAL at 05:39

## 2019-05-16 RX ADMIN — Medication 100 MILLIGRAM(S): at 21:56

## 2019-05-16 RX ADMIN — LIDOCAINE 1 PATCH: 4 CREAM TOPICAL at 18:46

## 2019-05-16 RX ADMIN — Medication 40 MILLIGRAM(S): at 15:24

## 2019-05-16 RX ADMIN — SODIUM CHLORIDE 3 MILLILITER(S): 9 INJECTION INTRAMUSCULAR; INTRAVENOUS; SUBCUTANEOUS at 14:13

## 2019-05-16 RX ADMIN — Medication 100 MILLIGRAM(S): at 05:39

## 2019-05-16 RX ADMIN — Medication 100 MILLIGRAM(S): at 05:40

## 2019-05-16 RX ADMIN — Medication 100 MILLIGRAM(S): at 17:13

## 2019-05-16 RX ADMIN — AMLODIPINE BESYLATE 5 MILLIGRAM(S): 2.5 TABLET ORAL at 05:41

## 2019-05-16 RX ADMIN — Medication 20 MILLIEQUIVALENT(S): at 05:39

## 2019-05-16 RX ADMIN — SENNA PLUS 2 TABLET(S): 8.6 TABLET ORAL at 21:56

## 2019-05-16 RX ADMIN — PANTOPRAZOLE SODIUM 40 MILLIGRAM(S): 20 TABLET, DELAYED RELEASE ORAL at 06:05

## 2019-05-16 NOTE — DISCHARGE NOTE PROVIDER - CARE PROVIDERS DIRECT ADDRESSES
[email or web address]

## 2019-05-16 NOTE — DISCHARGE NOTE PROVIDER - CARE PROVIDER_API CALL
Anant Smith (MD)  Surgery; Thoracic and Cardiac Surgery  [address], [address] [place] [address]  Phone: [phone]  Fax: [phone]  Follow Up Time:     Nixon Soler)  Cardiac Electrophysiology; Cardiovascular Disease  [address], [address] [place] [address]  Phone: [phone]  Fax: [phone]  Follow Up Time:

## 2019-05-16 NOTE — DISCHARGE NOTE PROVIDER - NSDCFUADDAPPT_GEN_ALL_CORE_FT
-Please follow up with Dr. Smith on ___.  The office is located at [facility], [facility], 4th floor. Call us with any questions #[phone].    - Please also follow-up with your Electrophysiologist Dr. Soler on . His contact information and office address is located in your discharge paperwork.     -Please follow-up with your primary care provider within 1-2 weeks of discharge to discuss your recent surgery.     -Walk daily as tolerated and use your incentive spirometer every hour.    -No driving or strenuous activity/exercise for 6 weeks, or until cleared by your surgeon.    -Gently clean your incisions with anti-bacterial soap and water, pat dry.  You may leave them open to air.    -Call your doctor if you have shortness of breath, chest pain not relieved by pain medication, dizziness, fever >101.5, or increased redness or drainage from incisions. -Please follow up with Dr. Smith on 5/29/19 at 10:30AM. The office is located at [facility], [facility], 4th floor. Call us with any questions #[phone].  - Please also follow-up with your Electrophysiologist Dr. Soler/Dr. Aj on 6/24/19 at 2:40PM. His contact information and office address is located in your discharge paperwork.    -Please follow-up with your primary care provider within 1-2 weeks of discharge to discuss your recent surgery.     -Walk daily as tolerated and use your incentive spirometer every hour.    -No driving or strenuous activity/exercise for 6 weeks, or until cleared by your surgeon.    -Gently clean your incisions with anti-bacterial soap and water, pat dry.  You may leave them open to air.    -Call your doctor if you have shortness of breath, chest pain not relieved by pain medication, dizziness, fever >101.5, or increased redness or drainage from incisions.

## 2019-05-16 NOTE — PROGRESS NOTE ADULT - SUBJECTIVE AND OBJECTIVE BOX
Patient discussed on morning rounds with Dr. Smith     Operation / Date:  5/9/19: Type A Dissection Repair EF 50%    SUBJECTIVE ASSESSMENT:  43y Male seen and examined at the bedside. Pt feeling well denies CP or SOB. Pt s/p DCCV today.    Vital Signs Last 24 Hrs  T(C): 36.2 (16 May 2019 05:01), Max: 37.6 (15 May 2019 18:00)  T(F): 97.1 (16 May 2019 05:01), Max: 99.6 (15 May 2019 18:00)  HR: 111 (16 May 2019 09:08) (92 - 118)  BP: 135/70 (16 May 2019 09:08) (103/67 - 143/79)  BP(mean): 80 (16 May 2019 09:08) (76 - 98)  RR: 18 (16 May 2019 09:08) (17 - 22)  SpO2: 93% (16 May 2019 09:08) (93% - 100%)  I&O's Detail    15 May 2019 07:01  -  16 May 2019 07:00  --------------------------------------------------------  IN:    Oral Fluid: 560 mL    sodium chloride 0.9%: 60 mL  Total IN: 620 mL    OUT:    Voided: 1250 mL  Total OUT: 1250 mL    Total NET: -630 mL      CHEST TUBE:  No.   VIVIANA DRAIN:  No.  EPICARDIAL WIRES: Yes  TIE DOWNS: Yes  SERNA: No.    PHYSICAL EXAM:    General:     Neurological:    Cardiovascular:    Respiratory:    Gastrointestinal:    Extremities:    Vascular:    Incision Sites:    LABS:                        9.4    10.64 )-----------( 382      ( 16 May 2019 05:52 )             29.5       COUMADIN:  No. REASON: - eliquis    PT/INR - ( 15 May 2019 11:49 )   PT: 14.9 sec;   INR: 1.31          PTT - ( 15 May 2019 11:49 )  PTT:35.5 sec    05-16    135  |  95<L>  |  20  ----------------------------<  111<H>  3.9   |  28  |  1.31<H>    Ca    9.3      16 May 2019 05:52  Phos  3.4     05-15  Mg     2.3     05-16    TPro  6.8  /  Alb  2.9<L>  /  TBili  0.2  /  DBili  x   /  AST  42<H>  /  ALT  37  /  AlkPhos  60  05-15    MEDICATIONS  (STANDING):  ALBUTerol/ipratropium for Nebulization 3 milliLiter(s) Nebulizer every 6 hours  amiodarone    Tablet   Oral   amiodarone    Tablet 400 milliGRAM(s) Oral every 8 hours  amLODIPine   Tablet 5 milliGRAM(s) Oral daily  apixaban 5 milliGRAM(s) Oral every 12 hours  aspirin enteric coated 81 milliGRAM(s) Oral daily  dextrose 5%. 1000 milliLiter(s) (50 mL/Hr) IV Continuous <Continuous>  docusate sodium 100 milliGRAM(s) Oral three times a day  furosemide   Injectable 40 milliGRAM(s) IV Push every 12 hours  lidocaine   Patch 1 Patch Transdermal daily  lisinopril 5 milliGRAM(s) Oral daily  metoprolol tartrate 100 milliGRAM(s) Oral two times a day  pantoprazole    Tablet 40 milliGRAM(s) Oral before breakfast  potassium chloride    Tablet ER 20 milliEquivalent(s) Oral two times a day  senna 2 Tablet(s) Oral at bedtime  sodium chloride 0.9% lock flush 3 milliLiter(s) IV Push every 8 hours    MEDICATIONS  (PRN):  acetaminophen   Tablet .. 650 milliGRAM(s) Oral every 6 hours PRN Mild Pain (1 - 3)  oxyCODONE    5 mG/acetaminophen 325 mG 1 Tablet(s) Oral every 6 hours PRN Moderate Pain (4 - 6)  polyethylene glycol 3350 17 Gram(s) Oral daily PRN Constipation        RADIOLOGY & ADDITIONAL TESTS:  CXR: < from: Xray Chest 1 View- PORTABLE-Routine (05.14.19 @ 04:47) >    A portable frontal view of the chest is compared to the prior study dated   5/13/2019. Right IJ line is unchanged. Ascending thoracic endoluminal   stent is again noted. Heart size remains enlarged. Improved congestion.   Atelectatic changes in the lung bases. Suspect small effusions. No   pneumothorax.      IMPRESSION: Improved congestion.      Echo: < from: Echocardiogram (05.14.19 @ 16:22) >  Mild to moderate concentric left ventricular hypertrophy.Abnormal (paradoxical) septal motion consistent with abnormal conductionThe left ventricular ejection fraction is normal.The left ventricular ejection fraction is 60%.The right ventricle is normal in size and function.The left atrial size is normal.Right atrial size is normal.No evidence for any hemodynamically significant valvular disease.There was insufficient TR detected from which to calculate pulmonary artery systolic pressure.  There is no pericardial effusion.Mild aortic root dilatation.The aortic root measures 4.2 cm at sinuses (normal less than 4 cm for men, less than 3.6 cm for women). Patient discussed on morning rounds with Dr. Smith     Operation / Date:  5/9/19: Type A Dissection Repair EF 50%    SUBJECTIVE ASSESSMENT:  43y Male seen and examined at the bedside. Pt feeling well denies CP or SOB. Pt s/p DCCV today.    Vital Signs Last 24 Hrs  T(C): 36.2 (16 May 2019 05:01), Max: 37.6 (15 May 2019 18:00)  T(F): 97.1 (16 May 2019 05:01), Max: 99.6 (15 May 2019 18:00)  HR: 111 (16 May 2019 09:08) (92 - 118)  BP: 135/70 (16 May 2019 09:08) (103/67 - 143/79)  BP(mean): 80 (16 May 2019 09:08) (76 - 98)  RR: 18 (16 May 2019 09:08) (17 - 22)  SpO2: 93% (16 May 2019 09:08) (93% - 100%)  I&O's Detail    15 May 2019 07:01  -  16 May 2019 07:00  --------------------------------------------------------  IN:    Oral Fluid: 560 mL    sodium chloride 0.9%: 60 mL  Total IN: 620 mL    OUT:    Voided: 1250 mL  Total OUT: 1250 mL    Total NET: -630 mL      CHEST TUBE:  No.   VIVIANA DRAIN:  No.  EPICARDIAL WIRES: Yes  TIE DOWNS: Yes  SERNA: No.    PHYSICAL EXAM:    General: NAD, sitting in bed     Neurological: moving all extremities with no focal deficits    Cardiovascular: RRR, no m/r/g    Respiratory: CTA b/l     Gastrointestinal: NT-ND, soft     Extremities: warm and well perfused, no edema or calf tenderness b/l     Vascular: +2 radial b/l     Incision Sites: sternotomy CDI, no erythema, eccymosis or drianage     LABS:                        9.4    10.64 )-----------( 382      ( 16 May 2019 05:52 )             29.5       COUMADIN:  No. REASON: - eliquis    PT/INR - ( 15 May 2019 11:49 )   PT: 14.9 sec;   INR: 1.31          PTT - ( 15 May 2019 11:49 )  PTT:35.5 sec    05-16    135  |  95<L>  |  20  ----------------------------<  111<H>  3.9   |  28  |  1.31<H>    Ca    9.3      16 May 2019 05:52  Phos  3.4     05-15  Mg     2.3     05-16    TPro  6.8  /  Alb  2.9<L>  /  TBili  0.2  /  DBili  x   /  AST  42<H>  /  ALT  37  /  AlkPhos  60  05-15    MEDICATIONS  (STANDING):  ALBUTerol/ipratropium for Nebulization 3 milliLiter(s) Nebulizer every 6 hours  amiodarone    Tablet   Oral   amiodarone    Tablet 400 milliGRAM(s) Oral every 8 hours  amLODIPine   Tablet 5 milliGRAM(s) Oral daily  apixaban 5 milliGRAM(s) Oral every 12 hours  aspirin enteric coated 81 milliGRAM(s) Oral daily  dextrose 5%. 1000 milliLiter(s) (50 mL/Hr) IV Continuous <Continuous>  docusate sodium 100 milliGRAM(s) Oral three times a day  furosemide   Injectable 40 milliGRAM(s) IV Push every 12 hours  lidocaine   Patch 1 Patch Transdermal daily  lisinopril 5 milliGRAM(s) Oral daily  metoprolol tartrate 100 milliGRAM(s) Oral two times a day  pantoprazole    Tablet 40 milliGRAM(s) Oral before breakfast  potassium chloride    Tablet ER 20 milliEquivalent(s) Oral two times a day  senna 2 Tablet(s) Oral at bedtime  sodium chloride 0.9% lock flush 3 milliLiter(s) IV Push every 8 hours    MEDICATIONS  (PRN):  acetaminophen   Tablet .. 650 milliGRAM(s) Oral every 6 hours PRN Mild Pain (1 - 3)  oxyCODONE    5 mG/acetaminophen 325 mG 1 Tablet(s) Oral every 6 hours PRN Moderate Pain (4 - 6)  polyethylene glycol 3350 17 Gram(s) Oral daily PRN Constipation        RADIOLOGY & ADDITIONAL TESTS:  CXR: < from: Xray Chest 1 View- PORTABLE-Routine (05.14.19 @ 04:47) >    A portable frontal view of the chest is compared to the prior study dated   5/13/2019. Right IJ line is unchanged. Ascending thoracic endoluminal   stent is again noted. Heart size remains enlarged. Improved congestion.   Atelectatic changes in the lung bases. Suspect small effusions. No   pneumothorax.      IMPRESSION: Improved congestion.      Echo: < from: Echocardiogram (05.14.19 @ 16:22) >  Mild to moderate concentric left ventricular hypertrophy.Abnormal (paradoxical) septal motion consistent with abnormal conductionThe left ventricular ejection fraction is normal.The left ventricular ejection fraction is 60%.The right ventricle is normal in size and function.The left atrial size is normal.Right atrial size is normal.No evidence for any hemodynamically significant valvular disease.There was insufficient TR detected from which to calculate pulmonary artery systolic pressure.  There is no pericardial effusion.Mild aortic root dilatation.The aortic root measures 4.2 cm at sinuses (normal less than 4 cm for men, less than 3.6 cm for women).

## 2019-05-16 NOTE — DISCHARGE NOTE PROVIDER - NSDCFUADDINST_GEN_ALL_CORE_FT
-After your surgery, you had an abnormal heart rhythm called "atrial fibrillation" which is an irregular heart beat. This is common after heart surgery, occurring in about 25-30% of patients. It has since returned to a normal rhythm after cardioversion. You were started on a new medication called Amiodarone to help keep it normal.  You will continue this for now, and your doctors will decide how long you need to stay on it when they see you in follow-up.    You are being discharged on a medication called Xarelto.  This medication is a blood thinning medication. Contact your provider if you have unexpected bleeding or bleeding that lasts a long time, such as unusual bleeding from the gums, nosebleeds that happen often, bleeding that is severe or you cannot control, Red, pink, or brown urine.

## 2019-05-16 NOTE — DISCHARGE NOTE PROVIDER - NSDCACTIVITY_GEN_ALL_CORE
Walking - Outdoors allowed/Do not make important decisions/Do not drive or operate machinery/Stairs allowed/Walking - Indoors allowed/No heavy lifting/straining/Showering allowed

## 2019-05-16 NOTE — PROGRESS NOTE ADULT - ASSESSMENT
A/P: 43 year old male with no known medical history who presented to [place] ED on 5/9/19 with acute onset substernal chest pain radiating to back with associated dizziness and b/l upper extremities paresthesia.  CTA C/A/P which demonstrated acute Type A dissection and was transferred to [facility] emergently. On 5/9/2019 he underwent surgical repair of type A dissection with Dr. Smith. intraoperatively he was transfused 6FFP, 2 platelets, 10 of cryo. Postoperatively he was brought to the CTICU and extubated. Post-operative course was complicated by paroxysmal Afib and pt was started on amio, beta blocker and noac. Ep was consulted. POD 6 he was transferred to [address] [facility] stepdown. today, POD#7, plan for DCCV w/ EPS team.     Neurovascular: No delirium. Pain well controlled with current regimen.  -PRN tylenol, perocet and lidocain patch     Cardiovascular: Hemodynamically stable. Type A dissection s/p repair post-op cb/ AF  -plan for ROZINA/DCCV today w/ EPS team, follow-up recs   -continue PO Amio load , Lopressor 100mg PO BID, and eliquis   -For BP control continue norvasc and lisinopril   -continue ASA     Respiratory: 02 Sat = 93% on RA. Post-op was c/b respiratory failure requiring intermittent BIPAP and HFNC, pt now weaned off Oxygen   -Encourage C+DB and Use of IS 10x / hr while awake.  -f/u tomorrow AM CXR  -Continue Diuresis for goal Neg I/O's- -600cc/24hrs    GI: Stable.  -NPO after MN for DCCV   -PPX- protonix  -PO Diet.    Renal / : continue to   -Monitor renal function.  -Monitor I/O's.    Endocrine: no h/o DM or thyroid disease    -A1c- 5.4  -TSH- 2.613    Hematologic:  -CBC.  -Coagulation Panel.    ID:  -Tempature.  -CBC.  -Observe for SIRS/Sepsis Syndrome.    Prophylaxis:  -DVT prophylaxis with 5000 SubQ Heparin q8h.  -SCD's    Disposition:  -ICU for frequent monitoring. A/P: 43 year old male with no known medical history who presented to [place] ED on 5/9/19 with acute onset substernal chest pain radiating to back with associated dizziness and b/l upper extremities paresthesia.  CTA C/A/P which demonstrated acute Type A dissection and was transferred to [facility] emergently. On 5/9/2019 he underwent surgical repair of type A dissection with Dr. Smith. intraoperatively he was transfused 6FFP, 2 platelets, 10 of cryo. Postoperatively he was brought to the CTICU and extubated. Post-operative course was complicated by paroxysmal Afib and pt was started on amio, beta blocker and noac. Ep was consulted. POD 6 he was transferred to [facility] stepdown. today, POD#7, plan for DCCV w/ EPS team.     Neurovascular: No delirium. Pain well controlled with current regimen.  -PRN tylenol, perocet and lidocain patch     Cardiovascular: Hemodynamically stable. Type A dissection s/p repair post-op cb/ AF  -plan for ROZINA/DCCV today w/ EPS team, follow-up recs   -continue PO Amio load , Lopressor 100mg PO BID, and eliquis   -For BP control continue norvasc and lisinopril   -continue ASA     Respiratory: 02 Sat = 93% on RA. Post-op was c/b respiratory failure requiring intermittent BIPAP and HFNC, pt now weaned off Oxygen   -Encourage C+DB and Use of IS 10x / hr while awake.  -f/u tomorrow AM CXR  -Continue Diuresis for goal Neg I/O's- -600cc/24hrs    GI: Stable.  -NPO after MN for DCCV   -PPX- protonix  -PO Diet.    Renal / : continue to trend BUN/Cr, slight rise today 20/1.30  -Monitor renal function.  -Monitor I/O's.    Endocrine: no h/o DM or thyroid disease    -A1c- 5.4  -TSH- 2.613    Hematologic: continue to trend H/H 9.4/29.5  -continue Eliquis for post-op AF     ID: no acute issues   -Temp- afebrile  -CBC- WBC 10.6  -Observe for SIRS/Sepsis Syndrome.    Prophylaxis:  -DVT prophylaxis elquis  -SCD's    Disposition:  -DCCV today

## 2019-05-16 NOTE — DISCHARGE NOTE PROVIDER - HOSPITAL COURSE
43 year old male with no known medical history who presented to [place] ED on 5/9/19 with acute onset substernal chest pain radiating to back with associated dizziness and b/l upper extremities paresthesia.  CTA C/A/P which demonstrated acute Type A dissection and was transferred to [facility] emergently. On 5/9/2019 he underwent surgical repair of type A dissection with Dr. Smith. Intraoperatively he was transfused 6FFP, 2 platelets, 10 of cryo. Postoperatively he was brought to the CTICU and later extubated. Post-operative course was complicated by paroxysmal Afib and pt was started on amio, beta blocker and NOAC, Eps team was consulted. Post-op was also complicated by respiratory insufficiently requiring intermittent BiPAP and HFNC which was weaned off to room air. POD 6 he was transferred to [facility] stepdown. On POD#7, pt underwent DCCV and converted to NSR..... 43 year old male with no known medical history who presented to [place] ED on 5/9/19 with acute onset substernal chest pain radiating to back with associated dizziness and b/l upper extremities paresthesia.  CTA C/A/P done which demonstrated acute Type A dissection and was transferred to [facility] emergently. On 5/9/2019 he underwent surgical repair of type A dissection with Dr. Smith. Intraoperatively he was transfused 6FFP, 2 platelets, 10 of cryo. Postoperatively he was brought to the CTICU and later extubated that day. Post-operative course was complicated by paroxysmal Afib and pt was started on amiodarone, beta blockers and NOAC, EPS team was consulted. Post-op was also complicated by respiratory insufficiently requiring intermittent BiPAP and HFNC which was weaned off to room air. POD 6 he was transferred to stepdown floor. On POD#7, pt underwent DCCV and converted to NSR. Today is POD8, patient is ambulating on room air, tolerating PO diet, moving his bowels postoperatively, last BM was 2 days ago, and is using IS, pulling 1500cc. Patient is hemodynamically stable in NSR and heart rate controlled. Per Dr. Smith, patient is medically stable for discharge home today. Of note, prior to discharge, patient's Cr elevated to 1.44  (baseline 1.04), as well as elevated WBC to 14.32, and elevated platelets to 474. All labs discussed with Dr. Smith and patient will attend [facility] for repeat blood work on Monday in 3 days. His sternotomy incision is clean, dry and intact with no surrounding erythema, and patient denies cough or dysuria. Patient understands and agrees with this plan.    35 minutes was spent with the patient reviewing the discharge material including medications, follow up appointments, recovery, concerning symptoms, and how to contact their health care providers if they have questions

## 2019-05-17 ENCOUNTER — TRANSCRIPTION ENCOUNTER (OUTPATIENT)
Age: 44
End: 2019-05-17

## 2019-05-17 VITALS — TEMPERATURE: 99 F

## 2019-05-17 PROBLEM — Z78.9 OTHER SPECIFIED HEALTH STATUS: Chronic | Status: ACTIVE | Noted: 2019-05-10

## 2019-05-17 LAB
ANION GAP SERPL CALC-SCNC: 11 MMOL/L — SIGNIFICANT CHANGE UP (ref 5–17)
BUN SERPL-MCNC: 19 MG/DL — SIGNIFICANT CHANGE UP (ref 7–23)
CALCIUM SERPL-MCNC: 9.5 MG/DL — SIGNIFICANT CHANGE UP (ref 8.4–10.5)
CHLORIDE SERPL-SCNC: 95 MMOL/L — LOW (ref 96–108)
CO2 SERPL-SCNC: 29 MMOL/L — SIGNIFICANT CHANGE UP (ref 22–31)
CREAT SERPL-MCNC: 1.44 MG/DL — HIGH (ref 0.5–1.3)
GLUCOSE SERPL-MCNC: 106 MG/DL — HIGH (ref 70–99)
HCT VFR BLD CALC: 29.6 % — LOW (ref 39–50)
HGB BLD-MCNC: 9.5 G/DL — LOW (ref 13–17)
MAGNESIUM SERPL-MCNC: 2.5 MG/DL — SIGNIFICANT CHANGE UP (ref 1.6–2.6)
MCHC RBC-ENTMCNC: 22 PG — LOW (ref 27–34)
MCHC RBC-ENTMCNC: 32.1 GM/DL — SIGNIFICANT CHANGE UP (ref 32–36)
MCV RBC AUTO: 68.5 FL — LOW (ref 80–100)
NRBC # BLD: 0 /100 WBCS — SIGNIFICANT CHANGE UP (ref 0–0)
PLATELET # BLD AUTO: 474 K/UL — HIGH (ref 150–400)
POTASSIUM SERPL-MCNC: 4.6 MMOL/L — SIGNIFICANT CHANGE UP (ref 3.5–5.3)
POTASSIUM SERPL-SCNC: 4.6 MMOL/L — SIGNIFICANT CHANGE UP (ref 3.5–5.3)
RBC # BLD: 4.32 M/UL — SIGNIFICANT CHANGE UP (ref 4.2–5.8)
RBC # FLD: 16.8 % — HIGH (ref 10.3–14.5)
SODIUM SERPL-SCNC: 135 MMOL/L — SIGNIFICANT CHANGE UP (ref 135–145)
WBC # BLD: 14.32 K/UL — HIGH (ref 3.8–10.5)
WBC # FLD AUTO: 14.32 K/UL — HIGH (ref 3.8–10.5)

## 2019-05-17 PROCEDURE — 86850 RBC ANTIBODY SCREEN: CPT

## 2019-05-17 PROCEDURE — 97116 GAIT TRAINING THERAPY: CPT

## 2019-05-17 PROCEDURE — 71045 X-RAY EXAM CHEST 1 VIEW: CPT

## 2019-05-17 PROCEDURE — 97162 PT EVAL MOD COMPLEX 30 MIN: CPT

## 2019-05-17 PROCEDURE — 80307 DRUG TEST PRSMV CHEM ANLYZR: CPT

## 2019-05-17 PROCEDURE — 83036 HEMOGLOBIN GLYCOSYLATED A1C: CPT

## 2019-05-17 PROCEDURE — 85384 FIBRINOGEN ACTIVITY: CPT

## 2019-05-17 PROCEDURE — 94002 VENT MGMT INPAT INIT DAY: CPT

## 2019-05-17 PROCEDURE — 80048 BASIC METABOLIC PNL TOTAL CA: CPT

## 2019-05-17 PROCEDURE — 80053 COMPREHEN METABOLIC PANEL: CPT

## 2019-05-17 PROCEDURE — 86901 BLOOD TYPING SEROLOGIC RH(D): CPT

## 2019-05-17 PROCEDURE — 36415 COLL VENOUS BLD VENIPUNCTURE: CPT

## 2019-05-17 PROCEDURE — 86780 TREPONEMA PALLIDUM: CPT

## 2019-05-17 PROCEDURE — 85610 PROTHROMBIN TIME: CPT

## 2019-05-17 PROCEDURE — 82330 ASSAY OF CALCIUM: CPT

## 2019-05-17 PROCEDURE — 93005 ELECTROCARDIOGRAM TRACING: CPT

## 2019-05-17 PROCEDURE — 93312 ECHO TRANSESOPHAGEAL: CPT

## 2019-05-17 PROCEDURE — C1889: CPT

## 2019-05-17 PROCEDURE — 93306 TTE W/DOPPLER COMPLETE: CPT

## 2019-05-17 PROCEDURE — 80061 LIPID PANEL: CPT

## 2019-05-17 PROCEDURE — 82803 BLOOD GASES ANY COMBINATION: CPT

## 2019-05-17 PROCEDURE — 84443 ASSAY THYROID STIM HORMONE: CPT

## 2019-05-17 PROCEDURE — 71045 X-RAY EXAM CHEST 1 VIEW: CPT | Mod: 26

## 2019-05-17 PROCEDURE — 83605 ASSAY OF LACTIC ACID: CPT

## 2019-05-17 PROCEDURE — 85027 COMPLETE CBC AUTOMATED: CPT

## 2019-05-17 PROCEDURE — 85379 FIBRIN DEGRADATION QUANT: CPT

## 2019-05-17 PROCEDURE — 84295 ASSAY OF SERUM SODIUM: CPT

## 2019-05-17 PROCEDURE — 82962 GLUCOSE BLOOD TEST: CPT

## 2019-05-17 PROCEDURE — C9248: CPT

## 2019-05-17 PROCEDURE — 84132 ASSAY OF SERUM POTASSIUM: CPT

## 2019-05-17 PROCEDURE — 86900 BLOOD TYPING SEROLOGIC ABO: CPT

## 2019-05-17 PROCEDURE — 83735 ASSAY OF MAGNESIUM: CPT

## 2019-05-17 PROCEDURE — 94640 AIRWAY INHALATION TREATMENT: CPT

## 2019-05-17 PROCEDURE — 84100 ASSAY OF PHOSPHORUS: CPT

## 2019-05-17 PROCEDURE — 36430 TRANSFUSION BLD/BLD COMPNT: CPT

## 2019-05-17 PROCEDURE — 94660 CPAP INITIATION&MGMT: CPT

## 2019-05-17 PROCEDURE — 88305 TISSUE EXAM BY PATHOLOGIST: CPT

## 2019-05-17 PROCEDURE — 85025 COMPLETE CBC W/AUTO DIFF WBC: CPT

## 2019-05-17 PROCEDURE — 85730 THROMBOPLASTIN TIME PARTIAL: CPT

## 2019-05-17 RX ORDER — AMLODIPINE BESYLATE 2.5 MG/1
1 TABLET ORAL
Qty: 30 | Refills: 0
Start: 2019-05-17 | End: 2019-06-15

## 2019-05-17 RX ORDER — ACETAMINOPHEN 500 MG
2 TABLET ORAL
Qty: 0 | Refills: 0 | DISCHARGE
Start: 2019-05-17

## 2019-05-17 RX ORDER — METOPROLOL TARTRATE 50 MG
1 TABLET ORAL
Qty: 60 | Refills: 0
Start: 2019-05-17 | End: 2019-06-15

## 2019-05-17 RX ORDER — DOCUSATE SODIUM 100 MG
1 CAPSULE ORAL
Qty: 21 | Refills: 0
Start: 2019-05-17 | End: 2019-05-23

## 2019-05-17 RX ORDER — LISINOPRIL 2.5 MG/1
1 TABLET ORAL
Qty: 30 | Refills: 0
Start: 2019-05-17 | End: 2019-06-15

## 2019-05-17 RX ORDER — AMIODARONE HYDROCHLORIDE 400 MG/1
1 TABLET ORAL
Qty: 30 | Refills: 0
Start: 2019-05-17 | End: 2019-06-15

## 2019-05-17 RX ORDER — RIVAROXABAN 15 MG-20MG
1 KIT ORAL
Qty: 30 | Refills: 0
Start: 2019-05-17 | End: 2019-06-15

## 2019-05-17 RX ORDER — ASPIRIN/CALCIUM CARB/MAGNESIUM 324 MG
1 TABLET ORAL
Qty: 30 | Refills: 0
Start: 2019-05-17 | End: 2019-06-15

## 2019-05-17 RX ORDER — SENNA PLUS 8.6 MG/1
2 TABLET ORAL
Qty: 14 | Refills: 0
Start: 2019-05-17 | End: 2019-05-23

## 2019-05-17 RX ORDER — PANTOPRAZOLE SODIUM 20 MG/1
1 TABLET, DELAYED RELEASE ORAL
Qty: 30 | Refills: 0
Start: 2019-05-17 | End: 2019-06-15

## 2019-05-17 RX ORDER — OXYCODONE AND ACETAMINOPHEN 5; 325 MG/1; MG/1
1 TABLET ORAL EVERY 6 HOURS
Refills: 0 | Status: DISCONTINUED | OUTPATIENT
Start: 2019-05-18 | End: 2019-05-17

## 2019-05-17 RX ADMIN — SODIUM CHLORIDE 3 MILLILITER(S): 9 INJECTION INTRAMUSCULAR; INTRAVENOUS; SUBCUTANEOUS at 05:24

## 2019-05-17 RX ADMIN — Medication 40 MILLIGRAM(S): at 06:40

## 2019-05-17 RX ADMIN — Medication 20 MILLIEQUIVALENT(S): at 05:56

## 2019-05-17 RX ADMIN — AMIODARONE HYDROCHLORIDE 400 MILLIGRAM(S): 400 TABLET ORAL at 13:29

## 2019-05-17 RX ADMIN — Medication 100 MILLIGRAM(S): at 13:29

## 2019-05-17 RX ADMIN — SODIUM CHLORIDE 3 MILLILITER(S): 9 INJECTION INTRAMUSCULAR; INTRAVENOUS; SUBCUTANEOUS at 13:30

## 2019-05-17 RX ADMIN — Medication 3 MILLILITER(S): at 06:39

## 2019-05-17 RX ADMIN — AMLODIPINE BESYLATE 5 MILLIGRAM(S): 2.5 TABLET ORAL at 06:40

## 2019-05-17 RX ADMIN — AMIODARONE HYDROCHLORIDE 400 MILLIGRAM(S): 400 TABLET ORAL at 06:40

## 2019-05-17 RX ADMIN — Medication 100 MILLIGRAM(S): at 06:42

## 2019-05-17 RX ADMIN — Medication 3 MILLILITER(S): at 11:19

## 2019-05-17 RX ADMIN — PANTOPRAZOLE SODIUM 40 MILLIGRAM(S): 20 TABLET, DELAYED RELEASE ORAL at 05:59

## 2019-05-17 RX ADMIN — Medication 81 MILLIGRAM(S): at 11:18

## 2019-05-17 RX ADMIN — Medication 100 MILLIGRAM(S): at 05:59

## 2019-05-17 RX ADMIN — LIDOCAINE 1 PATCH: 4 CREAM TOPICAL at 06:11

## 2019-05-17 RX ADMIN — LISINOPRIL 5 MILLIGRAM(S): 2.5 TABLET ORAL at 05:56

## 2019-05-17 RX ADMIN — LIDOCAINE 1 PATCH: 4 CREAM TOPICAL at 11:19

## 2019-05-17 RX ADMIN — OXYCODONE AND ACETAMINOPHEN 1 TABLET(S): 5; 325 TABLET ORAL at 05:55

## 2019-05-17 NOTE — PROGRESS NOTE ADULT - REASON FOR ADMISSION
Type A Aortic Dissection

## 2019-05-17 NOTE — DISCHARGE NOTE NURSING/CASE MANAGEMENT/SOCIAL WORK - NSDCDPATPORTLINK_GEN_ALL_CORE
You can access the [facility] Patient Portal, offered by [facility], by registering with the following website: [email or web address]

## 2019-05-17 NOTE — DISCHARGE NOTE NURSING/CASE MANAGEMENT/SOCIAL WORK - NSDCFUADDAPPT_GEN_ALL_CORE_FT
-Please follow up with Dr. Smith on 5/29/19 at 10:30AM. The office is located at [facility], [facility], 4th floor. Call us with any questions #[phone].  - Please also follow-up with your Electrophysiologist Dr. Soler/Dr. Aj on 6/24/19 at 2:40PM. His contact information and office address is located in your discharge paperwork.    -Please follow-up with your primary care provider within 1-2 weeks of discharge to discuss your recent surgery.     -Walk daily as tolerated and use your incentive spirometer every hour.    -No driving or strenuous activity/exercise for 6 weeks, or until cleared by your surgeon.    -Gently clean your incisions with anti-bacterial soap and water, pat dry.  You may leave them open to air.    -Call your doctor if you have shortness of breath, chest pain not relieved by pain medication, dizziness, fever >101.5, or increased redness or drainage from incisions.

## 2019-05-17 NOTE — PROGRESS NOTE ADULT - PROVIDER SPECIALTY LIST ADULT
CT Surgery
Critical Care

## 2019-05-17 NOTE — PROGRESS NOTE ADULT - SUBJECTIVE AND OBJECTIVE BOX
Patient discussed on morning rounds with Dr. Smith    Operation / Date: 5/9/19: Type A Dissection Repair EF 50%    Surgeon: Dr. Smith    Referring Physician: Dr. Ford    SUBJECTIVE ASSESSMENT  43y Male seen and examined bedside. Patient c/o incisional chest pain that is relieved with pain medication, he otherwise feels he is doing much better each day and feels ready to go home today. Denies chest pain, SOB, palpitations, hemopytsis, N/V/D, abdominal pain, dizziness, fever or chills. Patient is ambulating, tolerating PO diet, and voiding spontaneously.        AND HOSPITAL COURSE:  43 year old male with no known medical history who presented to [place] ED on 5/9/19 with acute onset substernal chest pain radiating to back with associated dizziness and b/l upper extremities paresthesia.  CTA C/A/P done which demonstrated acute Type A dissection and was transferred to [facility] emergently. On 5/9/2019 he underwent surgical repair of type A dissection with Dr. Smith. Intraoperatively he was transfused 6FFP, 2 platelets, 10 of cryo. Postoperatively he was brought to the CTICU and later extubated that day. Post-operative course was complicated by paroxysmal Afib and pt was started on amiodarone, beta blockers and NOAC, EPS team was consulted. Post-op was also complicated by respiratory insufficiently requiring intermittent BiPAP and HFNC which was weaned off to room air. POD 6 he was transferred to stepdown floor. On POD#7, pt underwent DCCV and converted to NSR. Today is POD8, patient is ambulating on room air, tolerating PO diet, moving his bowels postoperatively, last BM was 2 days ago, and is using IS, pulling 1500cc. Patient is hemodynamically stable in NSR and heart rate controlled. Per Dr. Smith, patient is medically stable for discharge home today. Of note, prior to discharge, patient's Cr elevated to 1.44  (baseline 1.04), as well as elevated WBC to 14.32, and elevated platelets to 474. All labs discussed with Dr. Smith and patient will attend [facility] for repeat blood work on Monday in 3 days. His sternotomy incision is clean, dry and intact with no surrounding erythema, and patient denies cough or dysuria. Patient understands and agrees with this plan.  35 minutes was spent with the patient reviewing the discharge material including medications, follow up appointments, recovery, concerning symptoms, and how to contact their health care providers if they have questions      Vital Signs Last 24 Hrs  T(C): 37.1 (17 May 2019 09:00), Max: 37.3 (16 May 2019 22:10)  T(F): 98.7 (17 May 2019 09:00), Max: 99.1 (16 May 2019 22:10)  HR: 68 (17 May 2019 12:08) (68 - 90)  BP: 134/66 (17 May 2019 12:08) (114/58 - 141/66)  BP(mean): 93 (17 May 2019 12:08) (79 - 95)  RR: 16 (17 May 2019 12:08) (16 - 18)  SpO2: 94% (17 May 2019 12:08) (89% - 96%)    EPICARDIAL WIRES REMOVED: Yes.  TIE DOWNS REMOVED: Yes    PHYSICAL EXAM:    General: Patient lying comfortably in bed, no acute distress     Neurological: Alert and oriented. No focal neurological deficits     Cardiovascular: S1S2, RRR, no murmurs appreciated on exam     Respiratory: Diminished breath sounds L>R, no wheezes rales or rhonchi.     Gastrointestinal: Abdomen soft, non tender, non distended     Extremities: Warm and well perfused. No peripheral edema or calf tenderness     Vascular: Peripheral pulses 2+ b/l    Incision Sites: MSI: c/d/i, no sternal click, no surrounding erythema. Chest tube sites: tiedowns removed, incisions c/d/i.  LABS:                        9.5    14.32 )-----------( 474      ( 17 May 2019 10:00 )             29.6       COUMADIN:  No.         05-17    135  |  95<L>  |  19  ----------------------------<  106<H>  4.6   |  29  |  1.44<H>    Ca    9.5      17 May 2019 10:00  Mg     2.5     05-17            Discharge CXR: < from: Xray Chest 1 View- PORTABLE-Routine (05.16.19 @ 05:45) >    EXAM:  XR CHEST PORTABLE ROUTINE 1V                          PROCEDURE DATE:  05/16/2019          INTERPRETATION:  Chest x-ray    Indication: Aortic dissection    A portable frontal view of the chest is compared to the prior study dated   5/15/2019. Right IJ line has been removed. Mild cardiomegaly. Persistent   small pleural effusions and atelectatic changes in the lung bases.   Hypoinflated lungs. No pneumothorax.      IMPRESSION: Hypoinflated lungs. Small pleural effusions and atelectatic   changes in the lung bases.    < end of copied text >      Discharge ECHO:    < from: ROZINA w/Doppler (05.16.19 @ 11:52) >    EXAM:  ESOPHAGEAL ECHO (CARDIOL)                          PROCEDURE DATE:  05/16/2019          INTERPRETATION:  Patient Height: 180.3 cm  Patient Weight: 120.2 kg  BSA: 2.4 m^2  Interpretation Summary  Indication: Pre DCCVA technically limited transesophageal   echocardiogram.No   clot seen in the left atrium or in the left atrial appendage. SHU   emptying   velocities are borderline  No evidence for interatrial shunt by color   Doppler   assessment.The aortic valve is trileaflet. Aortic root thickening noted -   likely post surgical changes. There is mild aortic regurgitation.   Structurally normal mitral valve. There is trace mitral   regurgitation.Structurally normal tricuspid valve. There is trace   tricuspid   regurgitation.Structurally normal pulmonic valve. No pulmonic   regurgitation   noted.The right ventricular systolic function is normal.The overall left   ventricular ejection fraction is probably normal.There is no pericardial   effusion.Left pleural effusion noted.Dissection flap with graft   visualized in   the aorta.    < end of copied text > Patient discussed on morning rounds with Dr. Smith    Operation / Date: 5/9/19: Type A Dissection Repair EF 50%    Surgeon: Dr. Smith    Referring Physician: Dr. Ford    SUBJECTIVE ASSESSMENT  43y Male seen and examined bedside. Patient c/o incisional chest pain that is relieved with pain medication, he otherwise feels he is doing much better each day and feels ready to go home today. Denies chest pain, SOB, palpitations, hemopytsis, N/V/D, abdominal pain, dizziness, fever or chills. Patient is ambulating, tolerating PO diet, and voiding spontaneously.        AND HOSPITAL COURSE:  43 year old male with no known medical history who presented to [place] ED on 5/9/19 with acute onset substernal chest pain radiating to back with associated dizziness and b/l upper extremities paresthesia.  CTA C/A/P done which demonstrated acute Type A dissection and was transferred to [facility] emergently. On 5/9/2019 he underwent surgical repair of type A dissection with Dr. Smith. Intraoperatively he was transfused 6FFP, 2 platelets, 10 of cryo. Postoperatively he was brought to the CTICU and later extubated that day. Post-operative course was complicated by paroxysmal Afib and pt was started on amiodarone, beta blockers and NOAC, EPS team was consulted. Post-op was also complicated by respiratory insufficiently requiring intermittent BiPAP and HFNC which was weaned off to room air. POD 6 he was transferred to stepdown floor. On POD#7, pt underwent DCCV and converted to NSR. Today is POD8, patient is ambulating on room air, tolerating PO diet, moving his bowels postoperatively, last BM was 2 days ago, and is using IS, pulling 1500cc. Patient is hemodynamically stable in NSR and heart rate controlled. Per Dr. Smith, patient is medically stable for discharge home today. Of note, prior to discharge, patient's Cr elevated to 1.44  (baseline 1.04), as well as elevated WBC to 14.32, and elevated platelets to 474. All labs discussed with Dr. Smith and patient will attend [facility] laboratory for repeat blood work on Monday in 3 days. His sternotomy incision is clean, dry and intact with no surrounding erythema, and patient denies cough or dysuria. Patient understands and agrees with this plan.  35 minutes was spent with the patient reviewing the discharge material including medications, follow up appointments, recovery, concerning symptoms, and how to contact their health care providers if they have questions    Of note, prescriptions including NOAC, confirmed and available for pickup at patients pharmacy.     Vital Signs Last 24 Hrs  T(C): 37.1 (17 May 2019 09:00), Max: 37.3 (16 May 2019 22:10)  T(F): 98.7 (17 May 2019 09:00), Max: 99.1 (16 May 2019 22:10)  HR: 68 (17 May 2019 12:08) (68 - 90)  BP: 134/66 (17 May 2019 12:08) (114/58 - 141/66)  BP(mean): 93 (17 May 2019 12:08) (79 - 95)  RR: 16 (17 May 2019 12:08) (16 - 18)  SpO2: 94% (17 May 2019 12:08) (89% - 96%)    EPICARDIAL WIRES REMOVED: Yes.  TIE DOWNS REMOVED: Yes    PHYSICAL EXAM:    General: Patient lying comfortably in bed, no acute distress     Neurological: Alert and oriented. No focal neurological deficits     Cardiovascular: S1S2, RRR, no murmurs appreciated on exam     Respiratory: Diminished breath sounds L>R, no wheezes rales or rhonchi.     Gastrointestinal: Abdomen soft, non tender, non distended     Extremities: Warm and well perfused. No peripheral edema or calf tenderness     Vascular: Peripheral pulses 2+ b/l    Incision Sites: MSI: c/d/i, no sternal click, no surrounding erythema. Chest tube sites: tiedowns removed, incisions c/d/i.  LABS:                        9.5    14.32 )-----------( 474      ( 17 May 2019 10:00 )             29.6       COUMADIN:  No.         05-17    135  |  95<L>  |  19  ----------------------------<  106<H>  4.6   |  29  |  1.44<H>    Ca    9.5      17 May 2019 10:00  Mg     2.5     05-17            Discharge CXR: < from: Xray Chest 1 View- PORTABLE-Routine (05.16.19 @ 05:45) >    EXAM:  XR CHEST PORTABLE ROUTINE 1V                          PROCEDURE DATE:  05/16/2019          INTERPRETATION:  Chest x-ray    Indication: Aortic dissection    A portable frontal view of the chest is compared to the prior study dated   5/15/2019. Right IJ line has been removed. Mild cardiomegaly. Persistent   small pleural effusions and atelectatic changes in the lung bases.   Hypoinflated lungs. No pneumothorax.      IMPRESSION: Hypoinflated lungs. Small pleural effusions and atelectatic   changes in the lung bases.    < end of copied text >      Discharge ECHO:    < from: ROZINA w/Doppler (05.16.19 @ 11:52) >    EXAM:  ESOPHAGEAL ECHO (CARDIOL)                          PROCEDURE DATE:  05/16/2019          INTERPRETATION:  Patient Height: 180.3 cm  Patient Weight: 120.2 kg  BSA: 2.4 m^2  Interpretation Summary  Indication: Pre DCCVA technically limited transesophageal   echocardiogram.No   clot seen in the left atrium or in the left atrial appendage. SHU   emptying   velocities are borderline  No evidence for interatrial shunt by color   Doppler   assessment.The aortic valve is trileaflet. Aortic root thickening noted -   likely post surgical changes. There is mild aortic regurgitation.   Structurally normal mitral valve. There is trace mitral   regurgitation.Structurally normal tricuspid valve. There is trace   tricuspid   regurgitation.Structurally normal pulmonic valve. No pulmonic   regurgitation   noted.The right ventricular systolic function is normal.The overall left   ventricular ejection fraction is probably normal.There is no pericardial   effusion.Left pleural effusion noted.Dissection flap with graft   visualized in   the aorta.    < end of copied text >

## 2019-05-17 NOTE — PROGRESS NOTE ADULT - ASSESSMENT
-Please follow up with Dr. Smith on 5/29/19 at 10:30AM. The office is located at [facility], 4th floor. Call us with any questions #[phone].  - Please also follow-up with your Electrophysiologist Dr. Soler/Dr. Aj on 6/24/19 at 2:40PM. His contact information and office address is located in your discharge paperwork.  -Please follow-up with your primary care provider within 1-2 weeks of discharge to discuss your recent surgery.   -Walk daily as tolerated and use your incentive spirometer every hour.  -No driving or strenuous activity/exercise for 6 weeks, or until cleared by your surgeon.  -Gently clean your incisions with anti-bacterial soap and water, pat dry.  You may leave them open to air.  -Call your doctor if you have shortness of breath, chest pain not relieved by pain medication, dizziness, fever >101.5, or increased redness or drainage from incisions.  Do not drive or operate machinery, Do not make important decisions, No heavy lifting/straining, Showering allowed, Stairs allowed, Walking - Indoors allowed, Walking - Outdoors allowed  -After your surgery, you had an abnormal heart rhythm called "atrial fibrillation" which is an irregular heart beat. This is common after heart surgery, occurring in about 25-30% of patients. It has since returned to a normal rhythm after cardioversion. You were started on a new medication called Amiodarone to help keep it normal.  You will continue this for now, and your doctors will decide how long you need to stay on it when they see you in follow-up.    You are being discharged on a medication called Xarelto.  This medication is a blood thinning medication. Contact your provider if you have unexpected bleeding or bleeding that lasts a long time, such as unusual bleeding from the gums, nosebleeds that happen often, bleeding that is severe or you cannot control, Red, pink, or brown urine.

## 2019-05-22 ENCOUNTER — FORM ENCOUNTER (OUTPATIENT)
Age: 44
End: 2019-05-22

## 2019-05-23 ENCOUNTER — APPOINTMENT (OUTPATIENT)
Dept: CARDIOTHORACIC SURGERY | Facility: CLINIC | Age: 44
End: 2019-05-23
Payer: COMMERCIAL

## 2019-05-23 ENCOUNTER — OUTPATIENT (OUTPATIENT)
Dept: OUTPATIENT SERVICES | Facility: HOSPITAL | Age: 44
LOS: 1 days | End: 2019-05-23
Payer: COMMERCIAL

## 2019-05-23 VITALS
HEART RATE: 92 BPM | WEIGHT: 265 LBS | TEMPERATURE: 97.3 F | HEIGHT: 71 IN | BODY MASS INDEX: 37.1 KG/M2 | SYSTOLIC BLOOD PRESSURE: 150 MMHG | OXYGEN SATURATION: 98 % | RESPIRATION RATE: 16 BRPM | DIASTOLIC BLOOD PRESSURE: 80 MMHG

## 2019-05-23 DIAGNOSIS — Z87.09 PERSONAL HISTORY OF OTHER DISEASES OF THE RESPIRATORY SYSTEM: ICD-10-CM

## 2019-05-23 DIAGNOSIS — J98.11 ATELECTASIS: ICD-10-CM

## 2019-05-23 LAB
ALBUMIN SERPL ELPH-MCNC: 3.4 G/DL — SIGNIFICANT CHANGE UP (ref 3.3–5)
ALP SERPL-CCNC: 58 U/L — SIGNIFICANT CHANGE UP (ref 40–120)
ALT FLD-CCNC: 43 U/L — SIGNIFICANT CHANGE UP (ref 10–45)
ANION GAP SERPL CALC-SCNC: 13 MMOL/L — SIGNIFICANT CHANGE UP (ref 5–17)
APPEARANCE UR: CLEAR — SIGNIFICANT CHANGE UP
AST SERPL-CCNC: 21 U/L — SIGNIFICANT CHANGE UP (ref 10–40)
BACTERIA # UR AUTO: PRESENT /HPF
BILIRUB SERPL-MCNC: 0.3 MG/DL — SIGNIFICANT CHANGE UP (ref 0.2–1.2)
BILIRUB UR-MCNC: ABNORMAL
BUN SERPL-MCNC: 12 MG/DL — SIGNIFICANT CHANGE UP (ref 7–23)
CALCIUM SERPL-MCNC: 10 MG/DL — SIGNIFICANT CHANGE UP (ref 8.4–10.5)
CHLORIDE SERPL-SCNC: 99 MMOL/L — SIGNIFICANT CHANGE UP (ref 96–108)
CO2 SERPL-SCNC: 27 MMOL/L — SIGNIFICANT CHANGE UP (ref 22–31)
COLOR SPEC: YELLOW — SIGNIFICANT CHANGE UP
CREAT SERPL-MCNC: 1.42 MG/DL — HIGH (ref 0.5–1.3)
DIFF PNL FLD: NEGATIVE — SIGNIFICANT CHANGE UP
EPI CELLS # UR: ABNORMAL /HPF (ref 0–5)
GLUCOSE SERPL-MCNC: 102 MG/DL — HIGH (ref 70–99)
GLUCOSE UR QL: NEGATIVE — SIGNIFICANT CHANGE UP
HCT VFR BLD CALC: 30.9 % — LOW (ref 39–50)
HGB BLD-MCNC: 9.6 G/DL — LOW (ref 13–17)
HYALINE CASTS # UR AUTO: SIGNIFICANT CHANGE UP /LPF (ref 0–2)
KETONES UR-MCNC: ABNORMAL MG/DL
LEUKOCYTE ESTERASE UR-ACNC: NEGATIVE — SIGNIFICANT CHANGE UP
MAGNESIUM SERPL-MCNC: 2.2 MG/DL — SIGNIFICANT CHANGE UP (ref 1.6–2.6)
MCHC RBC-ENTMCNC: 21.2 PG — LOW (ref 27–34)
MCHC RBC-ENTMCNC: 31.1 GM/DL — LOW (ref 32–36)
MCV RBC AUTO: 68.4 FL — LOW (ref 80–100)
NITRITE UR-MCNC: NEGATIVE — SIGNIFICANT CHANGE UP
NRBC # BLD: 0 /100 WBCS — SIGNIFICANT CHANGE UP (ref 0–0)
PH UR: 5.5 — SIGNIFICANT CHANGE UP (ref 5–8)
PLATELET # BLD AUTO: 759 K/UL — HIGH (ref 150–400)
POTASSIUM SERPL-MCNC: 4.2 MMOL/L — SIGNIFICANT CHANGE UP (ref 3.5–5.3)
POTASSIUM SERPL-SCNC: 4.2 MMOL/L — SIGNIFICANT CHANGE UP (ref 3.5–5.3)
PROT SERPL-MCNC: 8.2 G/DL — SIGNIFICANT CHANGE UP (ref 6–8.3)
PROT UR-MCNC: 30 MG/DL
RBC # BLD: 4.52 M/UL — SIGNIFICANT CHANGE UP (ref 4.2–5.8)
RBC # FLD: 16.7 % — HIGH (ref 10.3–14.5)
RBC CASTS # UR COMP ASSIST: < 5 /HPF — SIGNIFICANT CHANGE UP
SODIUM SERPL-SCNC: 139 MMOL/L — SIGNIFICANT CHANGE UP (ref 135–145)
SP GR SPEC: >=1.03 — SIGNIFICANT CHANGE UP (ref 1–1.03)
UROBILINOGEN FLD QL: 0.2 E.U./DL — SIGNIFICANT CHANGE UP
WBC # BLD: 9.36 K/UL — SIGNIFICANT CHANGE UP (ref 3.8–10.5)
WBC # FLD AUTO: 9.36 K/UL — SIGNIFICANT CHANGE UP (ref 3.8–10.5)
WBC UR QL: < 5 /HPF — SIGNIFICANT CHANGE UP

## 2019-05-23 PROCEDURE — 81001 URINALYSIS AUTO W/SCOPE: CPT

## 2019-05-23 PROCEDURE — 36415 COLL VENOUS BLD VENIPUNCTURE: CPT

## 2019-05-23 PROCEDURE — 71046 X-RAY EXAM CHEST 2 VIEWS: CPT

## 2019-05-23 PROCEDURE — 80053 COMPREHEN METABOLIC PANEL: CPT

## 2019-05-23 PROCEDURE — 85027 COMPLETE CBC AUTOMATED: CPT

## 2019-05-23 PROCEDURE — 87086 URINE CULTURE/COLONY COUNT: CPT

## 2019-05-23 PROCEDURE — 71046 X-RAY EXAM CHEST 2 VIEWS: CPT | Mod: 26

## 2019-05-23 PROCEDURE — 83735 ASSAY OF MAGNESIUM: CPT

## 2019-05-23 PROCEDURE — 99024 POSTOP FOLLOW-UP VISIT: CPT

## 2019-05-23 RX ORDER — LISINOPRIL 5 MG/1
5 TABLET ORAL DAILY
Qty: 30 | Refills: 3 | Status: DISCONTINUED | COMMUNITY
Start: 2019-05-21 | End: 2019-05-23

## 2019-05-24 LAB
CULTURE RESULTS: NO GROWTH — SIGNIFICANT CHANGE UP
SPECIMEN SOURCE: SIGNIFICANT CHANGE UP

## 2019-05-28 ENCOUNTER — FORM ENCOUNTER (OUTPATIENT)
Age: 44
End: 2019-05-28

## 2019-05-29 ENCOUNTER — APPOINTMENT (OUTPATIENT)
Dept: CARDIOTHORACIC SURGERY | Facility: CLINIC | Age: 44
End: 2019-05-29
Payer: SELF-PAY

## 2019-05-29 ENCOUNTER — OUTPATIENT (OUTPATIENT)
Dept: OUTPATIENT SERVICES | Facility: HOSPITAL | Age: 44
LOS: 1 days | End: 2019-05-29
Payer: COMMERCIAL

## 2019-05-29 DIAGNOSIS — I48.92 UNSPECIFIED ATRIAL FLUTTER: ICD-10-CM

## 2019-05-29 DIAGNOSIS — I48.0 PAROXYSMAL ATRIAL FIBRILLATION: ICD-10-CM

## 2019-05-29 DIAGNOSIS — D62 ACUTE POSTHEMORRHAGIC ANEMIA: ICD-10-CM

## 2019-05-29 DIAGNOSIS — J98.11 ATELECTASIS: ICD-10-CM

## 2019-05-29 DIAGNOSIS — Z98.84 BARIATRIC SURGERY STATUS: ICD-10-CM

## 2019-05-29 DIAGNOSIS — I48.91 UNSPECIFIED ATRIAL FIBRILLATION: ICD-10-CM

## 2019-05-29 DIAGNOSIS — R00.1 BRADYCARDIA, UNSPECIFIED: ICD-10-CM

## 2019-05-29 DIAGNOSIS — J95.821 ACUTE POSTPROCEDURAL RESPIRATORY FAILURE: ICD-10-CM

## 2019-05-29 DIAGNOSIS — J90 PLEURAL EFFUSION, NOT ELSEWHERE CLASSIFIED: ICD-10-CM

## 2019-05-29 DIAGNOSIS — N17.9 ACUTE KIDNEY FAILURE, UNSPECIFIED: ICD-10-CM
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
## 2019-05-29 DIAGNOSIS — I35.1 NONRHEUMATIC AORTIC (VALVE) INSUFFICIENCY: ICD-10-CM

## 2019-05-29 DIAGNOSIS — I71.01 DISSECTION OF THORACIC AORTA: ICD-10-CM

## 2019-05-29 DIAGNOSIS — Z79.82 LONG TERM (CURRENT) USE OF ASPIRIN: ICD-10-CM

## 2019-05-29 DIAGNOSIS — I97.89 OTHER POSTPROCEDURAL COMPLICATIONS AND DISORDERS OF THE CIRCULATORY SYSTEM, NOT ELSEWHERE CLASSIFIED: ICD-10-CM

## 2019-05-29 DIAGNOSIS — Z79.01 LONG TERM (CURRENT) USE OF ANTICOAGULANTS: ICD-10-CM

## 2019-05-29 DIAGNOSIS — I10 ESSENTIAL (PRIMARY) HYPERTENSION: ICD-10-CM

## 2019-05-29 LAB
ALBUMIN SERPL ELPH-MCNC: 3.8 G/DL — SIGNIFICANT CHANGE UP (ref 3.3–5)
ALP SERPL-CCNC: 56 U/L — SIGNIFICANT CHANGE UP (ref 40–120)
ALT FLD-CCNC: 15 U/L — SIGNIFICANT CHANGE UP (ref 10–45)
ANION GAP SERPL CALC-SCNC: 12 MMOL/L — SIGNIFICANT CHANGE UP (ref 5–17)
APPEARANCE UR: CLEAR — SIGNIFICANT CHANGE UP
APTT BLD: 41.4 SEC — HIGH (ref 27.5–36.3)
AST SERPL-CCNC: 19 U/L — SIGNIFICANT CHANGE UP (ref 10–40)
BACTERIA # UR AUTO: SIGNIFICANT CHANGE UP /HPF
BASOPHILS # BLD AUTO: 0.05 K/UL — SIGNIFICANT CHANGE UP (ref 0–0.2)
BASOPHILS NFR BLD AUTO: 0.6 % — SIGNIFICANT CHANGE UP (ref 0–2)
BILIRUB SERPL-MCNC: 0.3 MG/DL — SIGNIFICANT CHANGE UP (ref 0.2–1.2)
BILIRUB UR-MCNC: ABNORMAL
BUN SERPL-MCNC: 10 MG/DL — SIGNIFICANT CHANGE UP (ref 7–23)
CALCIUM SERPL-MCNC: 9.9 MG/DL — SIGNIFICANT CHANGE UP (ref 8.4–10.5)
CHLORIDE SERPL-SCNC: 102 MMOL/L — SIGNIFICANT CHANGE UP (ref 96–108)
CO2 SERPL-SCNC: 28 MMOL/L — SIGNIFICANT CHANGE UP (ref 22–31)
COLOR SPEC: YELLOW — SIGNIFICANT CHANGE UP
CREAT SERPL-MCNC: 1.48 MG/DL — HIGH (ref 0.5–1.3)
DIFF PNL FLD: NEGATIVE — SIGNIFICANT CHANGE UP
EOSINOPHIL # BLD AUTO: 0.25 K/UL — SIGNIFICANT CHANGE UP (ref 0–0.5)
EOSINOPHIL NFR BLD AUTO: 3.2 % — SIGNIFICANT CHANGE UP (ref 0–6)
EPI CELLS # UR: ABNORMAL /HPF (ref 0–5)
GLUCOSE SERPL-MCNC: 91 MG/DL — SIGNIFICANT CHANGE UP (ref 70–99)
GLUCOSE UR QL: NEGATIVE — SIGNIFICANT CHANGE UP
HCT VFR BLD CALC: 30.7 % — LOW (ref 39–50)
HGB BLD-MCNC: 9.5 G/DL — LOW (ref 13–17)
IMM GRANULOCYTES NFR BLD AUTO: 0.4 % — SIGNIFICANT CHANGE UP (ref 0–1.5)
INR BLD: 2.14 — HIGH (ref 0.88–1.16)
KETONES UR-MCNC: ABNORMAL MG/DL
LEUKOCYTE ESTERASE UR-ACNC: NEGATIVE — SIGNIFICANT CHANGE UP
LYMPHOCYTES # BLD AUTO: 1.21 K/UL — SIGNIFICANT CHANGE UP (ref 1–3.3)
LYMPHOCYTES # BLD AUTO: 15.6 % — SIGNIFICANT CHANGE UP (ref 13–44)
MCHC RBC-ENTMCNC: 21.3 PG — LOW (ref 27–34)
MCHC RBC-ENTMCNC: 30.9 GM/DL — LOW (ref 32–36)
MCV RBC AUTO: 69 FL — LOW (ref 80–100)
MONOCYTES # BLD AUTO: 0.58 K/UL — SIGNIFICANT CHANGE UP (ref 0–0.9)
MONOCYTES NFR BLD AUTO: 7.5 % — SIGNIFICANT CHANGE UP (ref 2–14)
NEUTROPHILS # BLD AUTO: 5.63 K/UL — SIGNIFICANT CHANGE UP (ref 1.8–7.4)
NEUTROPHILS NFR BLD AUTO: 72.7 % — SIGNIFICANT CHANGE UP (ref 43–77)
NITRITE UR-MCNC: NEGATIVE — SIGNIFICANT CHANGE UP
NRBC # BLD: 0 /100 WBCS — SIGNIFICANT CHANGE UP (ref 0–0)
PH UR: 6 — SIGNIFICANT CHANGE UP (ref 5–8)
PLATELET # BLD AUTO: 679 K/UL — HIGH (ref 150–400)
POTASSIUM SERPL-MCNC: 4.2 MMOL/L — SIGNIFICANT CHANGE UP (ref 3.5–5.3)
POTASSIUM SERPL-SCNC: 4.2 MMOL/L — SIGNIFICANT CHANGE UP (ref 3.5–5.3)
PROT SERPL-MCNC: 8.2 G/DL — SIGNIFICANT CHANGE UP (ref 6–8.3)
PROT UR-MCNC: 30 MG/DL
PROTHROM AB SERPL-ACNC: 24.7 SEC — HIGH (ref 10–12.9)
RBC # BLD: 4.45 M/UL — SIGNIFICANT CHANGE UP (ref 4.2–5.8)
RBC # FLD: 16.9 % — HIGH (ref 10.3–14.5)
RBC CASTS # UR COMP ASSIST: < 5 /HPF — SIGNIFICANT CHANGE UP
SODIUM SERPL-SCNC: 142 MMOL/L — SIGNIFICANT CHANGE UP (ref 135–145)
SP GR SPEC: >=1.03 — SIGNIFICANT CHANGE UP (ref 1–1.03)
UROBILINOGEN FLD QL: 0.2 E.U./DL — SIGNIFICANT CHANGE UP
WBC # BLD: 7.75 K/UL — SIGNIFICANT CHANGE UP (ref 3.8–10.5)
WBC # FLD AUTO: 7.75 K/UL — SIGNIFICANT CHANGE UP (ref 3.8–10.5)
WBC UR QL: < 5 /HPF — SIGNIFICANT CHANGE UP

## 2019-05-29 PROCEDURE — 36415 COLL VENOUS BLD VENIPUNCTURE: CPT

## 2019-05-29 PROCEDURE — 85730 THROMBOPLASTIN TIME PARTIAL: CPT

## 2019-05-29 PROCEDURE — 71046 X-RAY EXAM CHEST 2 VIEWS: CPT | Mod: 26

## 2019-05-29 PROCEDURE — 81001 URINALYSIS AUTO W/SCOPE: CPT

## 2019-05-29 PROCEDURE — 99024 POSTOP FOLLOW-UP VISIT: CPT

## 2019-05-29 PROCEDURE — 80053 COMPREHEN METABOLIC PANEL: CPT

## 2019-05-29 PROCEDURE — 71046 X-RAY EXAM CHEST 2 VIEWS: CPT

## 2019-05-29 PROCEDURE — 85025 COMPLETE CBC W/AUTO DIFF WBC: CPT

## 2019-05-29 PROCEDURE — 85610 PROTHROMBIN TIME: CPT

## 2019-05-30 VITALS
SYSTOLIC BLOOD PRESSURE: 133 MMHG | TEMPERATURE: 98.2 F | DIASTOLIC BLOOD PRESSURE: 74 MMHG | HEART RATE: 92 BPM | BODY MASS INDEX: 37.1 KG/M2 | OXYGEN SATURATION: 98 % | WEIGHT: 265 LBS | RESPIRATION RATE: 16 BRPM | HEIGHT: 71 IN

## 2019-06-04 PROBLEM — I48.91 ATRIAL FIBRILLATION: Status: ACTIVE | Noted: 2019-06-04

## 2019-06-12 LAB — MISCELLANEOUS TEST NAME: SIGNIFICANT CHANGE UP

## 2019-06-24 ENCOUNTER — APPOINTMENT (OUTPATIENT)
Dept: HEART AND VASCULAR | Facility: CLINIC | Age: 44
End: 2019-06-24
Payer: COMMERCIAL

## 2019-06-24 ENCOUNTER — NON-APPOINTMENT (OUTPATIENT)
Age: 44
End: 2019-06-24

## 2019-06-24 VITALS
HEART RATE: 77 BPM | BODY MASS INDEX: 37.1 KG/M2 | DIASTOLIC BLOOD PRESSURE: 84 MMHG | SYSTOLIC BLOOD PRESSURE: 130 MMHG | HEIGHT: 71 IN | WEIGHT: 265 LBS

## 2019-06-24 PROCEDURE — 99214 OFFICE O/P EST MOD 30 MIN: CPT | Mod: 25

## 2019-06-24 PROCEDURE — 93000 ELECTROCARDIOGRAM COMPLETE: CPT

## 2019-06-24 RX ORDER — AMOXICILLIN AND CLAVULANATE POTASSIUM 875; 125 MG/1; MG/1
875-125 TABLET, COATED ORAL
Qty: 14 | Refills: 0 | Status: DISCONTINUED | COMMUNITY
Start: 2019-05-23 | End: 2019-06-24

## 2019-06-24 RX ORDER — PANTOPRAZOLE SODIUM 40 MG/1
40 TABLET, DELAYED RELEASE ORAL
Refills: 3 | Status: DISCONTINUED | COMMUNITY
Start: 2019-05-21 | End: 2019-06-24

## 2019-06-26 NOTE — PHYSICAL EXAM
[General Appearance - Well Developed] : well developed [Normal Appearance] : normal appearance [Well Groomed] : well groomed [General Appearance - Well Nourished] : well nourished [No Deformities] : no deformities [General Appearance - In No Acute Distress] : no acute distress [Normal Conjunctiva] : the conjunctiva exhibited no abnormalities [Eyelids - No Xanthelasma] : the eyelids demonstrated no xanthelasmas [No Oral Pallor] : no oral pallor [Normal Oral Mucosa] : normal oral mucosa [No Oral Cyanosis] : no oral cyanosis [Normal Jugular Venous A Waves Present] : normal jugular venous A waves present [Normal Jugular Venous V Waves Present] : normal jugular venous V waves present [No Jugular Venous Bah A Waves] : no jugular venous bah A waves [Exaggerated Use Of Accessory Muscles For Inspiration] : no accessory muscle use [Auscultation Breath Sounds / Voice Sounds] : lungs were clear to auscultation bilaterally [Respiration, Rhythm And Depth] : normal respiratory rhythm and effort [Heart Rate And Rhythm] : heart rate and rhythm were normal [Heart Sounds] : normal S1 and S2 [Murmurs] : no murmurs present [Abdomen Soft] : soft [Abdomen Tenderness] : non-tender [Abdomen Mass (___ Cm)] : no abdominal mass palpated [Abnormal Walk] : normal gait [Gait - Sufficient For Exercise Testing] : the gait was sufficient for exercise testing [Nail Clubbing] : no clubbing of the fingernails [Petechial Hemorrhages (___cm)] : no petechial hemorrhages [Cyanosis, Localized] : no localized cyanosis [Oriented To Time, Place, And Person] : oriented to person, place, and time [] : no ischemic changes [Mood] : the mood was normal [No Anxiety] : not feeling anxious [Affect] : the affect was normal

## 2019-06-26 NOTE — DISCUSSION/SUMMARY
[FreeTextEntry1] : Mr. Dawn is a 43 year-old gentleman s/p Type A aortic dissection repair complicated by post-operative atrial fibrillation.  We discussed the utility of an ILR to monitor for recurrent atrial arrhythmia and guide management of AAD therapy / oral anticoagulation.  He would like to proceed and is scheduled at his convenience.  Remote monitoring discussed with the patient in detail.  Advised to follow-up one month post procedure and call with any questions or concerns in the interim.

## 2019-06-26 NOTE — HISTORY OF PRESENT ILLNESS
[FreeTextEntry1] : Mr. Dawn is a pleasant 43 year-old gentleman with a history of acute Type A dissection s/p surgical repair with Dr. Smith 5/9/19.   Post-op course was complicated by atrial fibrillation; he was started on Amiodarone and is s/p successful DCCV 5/16/19.  He presents for post procedure follow-up today and offers no acute complaints.  Reports he is feeling better each day.  No active CP, palpitations, dizziness, presyncope or syncope.  Reports compliance with medications including Amiodarone and Xarelto.   Denies any bleeding issues.

## 2019-07-08 ENCOUNTER — RX RENEWAL (OUTPATIENT)
Age: 44
End: 2019-07-08

## 2019-07-08 ENCOUNTER — OUTPATIENT (OUTPATIENT)
Dept: OUTPATIENT SERVICES | Facility: HOSPITAL | Age: 44
LOS: 1 days | Discharge: HOME | End: 2019-07-08
Payer: COMMERCIAL

## 2019-07-08 ENCOUNTER — MEDICATION RENEWAL (OUTPATIENT)
Age: 44
End: 2019-07-08

## 2019-07-08 PROCEDURE — C1764: CPT

## 2019-07-08 PROCEDURE — 33285 INSJ SUBQ CAR RHYTHM MNTR: CPT

## 2019-07-15 ENCOUNTER — EMERGENCY (EMERGENCY)
Facility: HOSPITAL | Age: 44
LOS: 1 days | Discharge: HOME | End: 2019-07-15
Attending: EMERGENCY MEDICINE | Admitting: EMERGENCY MEDICINE
Payer: COMMERCIAL

## 2019-07-15 VITALS
TEMPERATURE: 98 F | OXYGEN SATURATION: 99 % | HEART RATE: 58 BPM | RESPIRATION RATE: 18 BRPM | DIASTOLIC BLOOD PRESSURE: 100 MMHG | SYSTOLIC BLOOD PRESSURE: 168 MMHG

## 2019-07-15 VITALS
TEMPERATURE: 98 F | SYSTOLIC BLOOD PRESSURE: 164 MMHG | WEIGHT: 247.58 LBS | HEART RATE: 82 BPM | DIASTOLIC BLOOD PRESSURE: 98 MMHG | HEIGHT: 71 IN | RESPIRATION RATE: 18 BRPM | OXYGEN SATURATION: 100 %

## 2019-07-15 DIAGNOSIS — R42 DIZZINESS AND GIDDINESS: ICD-10-CM

## 2019-07-15 DIAGNOSIS — H53.2 DIPLOPIA: ICD-10-CM

## 2019-07-15 DIAGNOSIS — R05 COUGH: ICD-10-CM

## 2019-07-15 LAB
ALBUMIN SERPL ELPH-MCNC: 3.9 G/DL — SIGNIFICANT CHANGE UP (ref 3.3–5)
ALP SERPL-CCNC: 48 U/L — SIGNIFICANT CHANGE UP (ref 40–120)
ALT FLD-CCNC: 14 U/L — SIGNIFICANT CHANGE UP (ref 10–45)
ANION GAP SERPL CALC-SCNC: 11 MMOL/L — SIGNIFICANT CHANGE UP (ref 5–17)
ANISOCYTOSIS BLD QL: SLIGHT — SIGNIFICANT CHANGE UP
APTT BLD: 37.6 SEC — HIGH (ref 27.5–36.3)
AST SERPL-CCNC: 16 U/L — SIGNIFICANT CHANGE UP (ref 10–40)
BASOPHILS # BLD AUTO: 0.14 K/UL — SIGNIFICANT CHANGE UP (ref 0–0.2)
BASOPHILS NFR BLD AUTO: 2.6 % — HIGH (ref 0–2)
BILIRUB SERPL-MCNC: 0.2 MG/DL — SIGNIFICANT CHANGE UP (ref 0.2–1.2)
BUN SERPL-MCNC: 16 MG/DL — SIGNIFICANT CHANGE UP (ref 7–23)
CALCIUM SERPL-MCNC: 9.5 MG/DL — SIGNIFICANT CHANGE UP (ref 8.4–10.5)
CHLORIDE SERPL-SCNC: 101 MMOL/L — SIGNIFICANT CHANGE UP (ref 96–108)
CO2 SERPL-SCNC: 26 MMOL/L — SIGNIFICANT CHANGE UP (ref 22–31)
CREAT SERPL-MCNC: 1.29 MG/DL — SIGNIFICANT CHANGE UP (ref 0.5–1.3)
EOSINOPHIL # BLD AUTO: 0.1 K/UL — SIGNIFICANT CHANGE UP (ref 0–0.5)
EOSINOPHIL NFR BLD AUTO: 1.8 % — SIGNIFICANT CHANGE UP (ref 0–6)
EXTRA SST TUBE: SIGNIFICANT CHANGE UP
GIANT PLATELETS BLD QL SMEAR: PRESENT — SIGNIFICANT CHANGE UP
GLUCOSE SERPL-MCNC: 97 MG/DL — SIGNIFICANT CHANGE UP (ref 70–99)
HCT VFR BLD CALC: 36.8 % — LOW (ref 39–50)
HGB BLD-MCNC: 11.1 G/DL — LOW (ref 13–17)
HYPOCHROMIA BLD QL: SLIGHT — SIGNIFICANT CHANGE UP
INR BLD: 1.29 — HIGH (ref 0.88–1.16)
LYMPHOCYTES # BLD AUTO: 1.4 K/UL — SIGNIFICANT CHANGE UP (ref 1–3.3)
LYMPHOCYTES # BLD AUTO: 25.7 % — SIGNIFICANT CHANGE UP (ref 13–44)
MANUAL SMEAR VERIFICATION: SIGNIFICANT CHANGE UP
MCHC RBC-ENTMCNC: 20.6 PG — LOW (ref 27–34)
MCHC RBC-ENTMCNC: 30.2 GM/DL — LOW (ref 32–36)
MCV RBC AUTO: 68.1 FL — LOW (ref 80–100)
MICROCYTES BLD QL: SIGNIFICANT CHANGE UP
MONOCYTES # BLD AUTO: 0.14 K/UL — SIGNIFICANT CHANGE UP (ref 0–0.9)
MONOCYTES NFR BLD AUTO: 2.6 % — SIGNIFICANT CHANGE UP (ref 2–14)
NEUTROPHILS # BLD AUTO: 3.66 K/UL — SIGNIFICANT CHANGE UP (ref 1.8–7.4)
NEUTROPHILS NFR BLD AUTO: 67.3 % — SIGNIFICANT CHANGE UP (ref 43–77)
OVALOCYTES BLD QL SMEAR: SLIGHT — SIGNIFICANT CHANGE UP
PLAT MORPH BLD: ABNORMAL
PLATELET # BLD AUTO: 305 K/UL — SIGNIFICANT CHANGE UP (ref 150–400)
POIKILOCYTOSIS BLD QL AUTO: SIGNIFICANT CHANGE UP
POLYCHROMASIA BLD QL SMEAR: SLIGHT — SIGNIFICANT CHANGE UP
POTASSIUM SERPL-MCNC: 4 MMOL/L — SIGNIFICANT CHANGE UP (ref 3.5–5.3)
POTASSIUM SERPL-SCNC: 4 MMOL/L — SIGNIFICANT CHANGE UP (ref 3.5–5.3)
PROT SERPL-MCNC: 8 G/DL — SIGNIFICANT CHANGE UP (ref 6–8.3)
PROTHROM AB SERPL-ACNC: 14.7 SEC — HIGH (ref 10–12.9)
RBC # BLD: 5.4 M/UL — SIGNIFICANT CHANGE UP (ref 4.2–5.8)
RBC # FLD: 17.9 % — HIGH (ref 10.3–14.5)
RBC BLD AUTO: ABNORMAL
SCHISTOCYTES BLD QL AUTO: SLIGHT — SIGNIFICANT CHANGE UP
SODIUM SERPL-SCNC: 138 MMOL/L — SIGNIFICANT CHANGE UP (ref 135–145)
SPHEROCYTES BLD QL SMEAR: SLIGHT — SIGNIFICANT CHANGE UP
TROPONIN T SERPL-MCNC: <0.01 NG/ML — SIGNIFICANT CHANGE UP (ref 0–0.01)
WBC # BLD: 5.44 K/UL — SIGNIFICANT CHANGE UP (ref 3.8–10.5)
WBC # FLD AUTO: 5.44 K/UL — SIGNIFICANT CHANGE UP (ref 3.8–10.5)

## 2019-07-15 PROCEDURE — 74174 CTA ABD&PLVS W/CONTRAST: CPT

## 2019-07-15 PROCEDURE — 80053 COMPREHEN METABOLIC PANEL: CPT

## 2019-07-15 PROCEDURE — 85730 THROMBOPLASTIN TIME PARTIAL: CPT

## 2019-07-15 PROCEDURE — 93308 TTE F-UP OR LMTD: CPT | Mod: 26

## 2019-07-15 PROCEDURE — 85025 COMPLETE CBC W/AUTO DIFF WBC: CPT

## 2019-07-15 PROCEDURE — 70450 CT HEAD/BRAIN W/O DYE: CPT | Mod: 26

## 2019-07-15 PROCEDURE — 82962 GLUCOSE BLOOD TEST: CPT

## 2019-07-15 PROCEDURE — 99285 EMERGENCY DEPT VISIT HI MDM: CPT | Mod: 25

## 2019-07-15 PROCEDURE — 36415 COLL VENOUS BLD VENIPUNCTURE: CPT

## 2019-07-15 PROCEDURE — 71045 X-RAY EXAM CHEST 1 VIEW: CPT | Mod: 26

## 2019-07-15 PROCEDURE — 71275 CT ANGIOGRAPHY CHEST: CPT

## 2019-07-15 PROCEDURE — 99024 POSTOP FOLLOW-UP VISIT: CPT

## 2019-07-15 PROCEDURE — 70450 CT HEAD/BRAIN W/O DYE: CPT

## 2019-07-15 PROCEDURE — 85610 PROTHROMBIN TIME: CPT

## 2019-07-15 PROCEDURE — 93308 TTE F-UP OR LMTD: CPT

## 2019-07-15 PROCEDURE — 71275 CT ANGIOGRAPHY CHEST: CPT | Mod: 26

## 2019-07-15 PROCEDURE — 93285 PRGRMG DEV EVAL SCRMS IP: CPT | Mod: 26

## 2019-07-15 PROCEDURE — 99284 EMERGENCY DEPT VISIT MOD MDM: CPT | Mod: 25

## 2019-07-15 PROCEDURE — 74174 CTA ABD&PLVS W/CONTRAST: CPT | Mod: 26

## 2019-07-15 PROCEDURE — 71045 X-RAY EXAM CHEST 1 VIEW: CPT

## 2019-07-15 PROCEDURE — 84484 ASSAY OF TROPONIN QUANT: CPT

## 2019-07-15 RX ORDER — SODIUM CHLORIDE 9 MG/ML
1000 INJECTION INTRAMUSCULAR; INTRAVENOUS; SUBCUTANEOUS
Refills: 0 | Status: DISCONTINUED | OUTPATIENT
Start: 2019-07-15 | End: 2019-07-19

## 2019-07-15 RX ADMIN — SODIUM CHLORIDE 250 MILLILITER(S): 9 INJECTION INTRAMUSCULAR; INTRAVENOUS; SUBCUTANEOUS at 09:37

## 2019-07-15 NOTE — ED PROVIDER NOTE - CARE PROVIDERS DIRECT ADDRESSES
[email or web address]

## 2019-07-15 NOTE — CONSULT NOTE ADULT - ASSESSMENT
42 y/o s/p aortic dissection repair >1 month ago, presenting to the ED with persistent dizziness and double vision.          Plan:  Problem 1: Dizziness/vision changes.  STAT CT scan of the head w/o contrast obtained by ED staff..       Problem 2: Aortic dissection:  BP...      Problem 3:       Problem 4:    I have reviewed clinical labs tests and reports, radiology tests and reports, as well as old patient medical records, and discussed with the refering physician. 42 y/o s/p aortic dissection repair >2 months ago, presenting to the ED with persistent dizziness and double vision.        Plan:  Problem 1: Dizziness/vision changes.  CT scan C/A/P ordered by ED staff to evaluate his dissection repair.  Limited echo to be done to r/o pericardial effusion/tamponde.  EPS team called to see patient and interrogate his loop recorder.        Problem 2: Aortic dissection/HTN:  Continue w/ BP control.  Currently /76, however patient admits to being nervous.  He takes his BP at home "very frequently" and it's usually 130's systolic.  His cardiologist is managing his BP meds, he has a F/U appt with him in August.          I have reviewed clinical labs tests and reports, radiology tests and reports, as well as old patient medical records, and discussed with the refering physician. 44 y/o s/p aortic dissection repair >2 months ago, presenting to the ED with persistent dizziness and double vision.        Plan:  Problem 1: Dizziness/vision changes.  CT scan C/A/P ordered by ED staff to evaluate his dissection repair.  Limited echo to be done to r/o pericardial effusion/tamponde.  EPS team called to see patient and interrogate his loop recorder.      Problem 2: Aortic dissection/HTN:  Continue w/ BP control.  Currently /76, however patient admits to being nervous.  He takes his BP at home "very frequently" and it's usually 130's systolic.  His cardiologist is managing his BP meds, he has a F/U appt with him in August.      Addendum @ 15:00.  Discussed with Dr. Mckay, CT surgeon who was also in this patient's surgery.  We also recommend getting a non-contrast CT head and neurology evaluation for his symptoms.  From CT surgery standpoint, patient can be discharged home and should F/U with his Cardiologist (appt made for August) in addition to a neurologist.      I have reviewed clinical labs tests and reports, radiology tests and reports, as well as old patient medical records, and discussed with the referring physician.

## 2019-07-15 NOTE — ED ADULT TRIAGE NOTE - CHIEF COMPLAINT QUOTE
Pt CO 1 episode of dizziness @ 0700 lasting 1 minute.  Pt states "I had an Aortic dissection repair on 5/9/19 and every now and then I'll get these dizzy spells or blurred vision."  EKG and FSBG in progress.  Pt denies N/V/D, SOB, Fevers, CP at this time.  Speech clear, No headache, Strength and ROM intact x4 Ext.

## 2019-07-15 NOTE — CONSULT NOTE ADULT - SUBJECTIVE AND OBJECTIVE BOX
Surgeon: Anant Bui    Requesting Physician: Dr. Hill (ED attending)    HISTORY OF PRESENT ILLNESS (Need 4):  **Incomplete note, patient not seen yet**  This is a 43 year old male who is known to our service, s/p Type A aortic dissection repair on 5/9/19 with Dr. Smith.  He has no other known PMHx.  His post-operative course was complicated by paroxysmal afib and pt was started on amiodarone, beta blockers and NOAC, EPS team was consulted.  On POD#7, s/p successful DCCV.  He was discharged home on POD #8.  Since then he has been seen in the out-patient clinic and has been c/o some dizziness and vision disturbances, mainly double vision.  He was seen by Dr. Smith and was told to come to the ED if his symptoms worsened or did not go away.   PAST MEDICAL & SURGICAL HISTORY:    Thoracic aortic aneurysm  Atrial fibrillation.    MEDICATIONS  (STANDING):  sodium chloride 0.9%. 1000 milliLiter(s) (250 mL/Hr) IV Continuous <Continuous>    MEDICATIONS  (PRN):      Allergies    No Known Allergies    Intolerances        SOCIAL HISTORY:  Smoker:  YES / NO        PACK YEARS:                         WHEN QUIT?  ETOH use:  YES / NO               FREQUENCY / QUANTITY:  Ilicit Drug use:  YES / NO  Occupation:  Assisted device use (Cane / Walker):  Live with:    FAMILY HISTORY:  No pertinent family history in first degree relatives      Review of Systems (Need 10):  CONSTITUTIONAL: Denies fevers / chills, sweats, fatigue, weight loss, weight gain                                       NEURO:  +dizziness, +double vision.  Denies parathesias, seizures, syncope, confusion                                                                                  EYES:  +double vision.  Denies discharge from his eyes, pain, loss of vision                                                                                    ENMT:  Denies difficulty hearing, vertigo, dysphagia, epistaxis, recent dental work                                       CV:  Denies chest pain, palpitations, FRANCIS, orthopnea                                                                                           RESPIRATORY:  Denies wWheezing, SOB, cough / sputum, hemoptysis                                                               GI:  Denies nausea, vomiting, diarrhea, constipation, melena                                                                          : Denies hematuria, dysuria, urgency, incontinence                                                                                          MUSKULOSKELETAL:  Denies arthritis, joint swelling, muscle weakness                                                             SKIN/BREAST:  Denies rash, itching, hair loss, masses                                                                                              PSYCH:  Denies depression, anxiety, suicidal ideation                                                                                                HEME/LYMPH:  Denies bruises easily, enlarged lymph nodes, tender lymph nodes                                          ENDOCRINE:  Denies cold intolerance, heat intolerance, polydipsia                                                                      Vital Signs Last 24 Hrs  T(C): 36.7 (15 Jul 2019 09:07), Max: 36.7 (15 Jul 2019 08:55)  T(F): 98 (15 Jul 2019 09:07), Max: 98.1 (15 Jul 2019 08:55)  HR: 62 (15 Jul 2019 09:18) (62 - 82)  BP: 176/95 (15 Jul 2019 09:18) (164/98 - 176/95)  BP(mean): --  RR: 18 (15 Jul 2019 09:07) (18 - 18)  SpO2: 100% (15 Jul 2019 09:07) (100% - 100%)    Physical Exam  GEN: NAD, looks comfortable  Psych: Mood appropriate  Neuro: A&Ox3.  No focal deficits.  Moving all extremities.   HEENT: No obvious abnormalities  CV: S1S2, regular, no murmurs appreciated.  No carotid bruits.  No JVD  Lungs: Clear B/L.  No wheezing, rales or rhonchi  ABD: Soft, non-tender, non-distended.  +Bowel sounds  EXT: Warm and well perfused.  No peripheral edema noted  Musculoskeletal: Moving all extremities with normal ROM, no joint swelling  PV: Pedal pulses palpable                                                            LABS:                        11.1   5.44  )-----------( 305      ( 15 Jul 2019 09:19 )             36.8     07-15    138  |  101  |  16  ----------------------------<  97  4.0   |  26  |  1.29    Ca    9.5      15 Jul 2019 09:19    TPro  8.0  /  Alb  3.9  /  TBili  0.2  /  DBili  x   /  AST  16  /  ALT  14  /  AlkPhos  48  07-15    PT/INR - ( 15 Jul 2019 09:19 )   PT: 14.7 sec;   INR: 1.29          PTT - ( 15 Jul 2019 09:19 )  PTT:37.6 sec    CARDIAC MARKERS ( 15 Jul 2019 09:19 )  x     / <0.01 ng/mL / x     / x     / x              RADIOLOGY & ADDITIONAL STUDIES:  < from: 12 Lead ECG (05.10.19 @ 00:43) >  Diagnosis Line Sinus rhythm with 1st degree AV block  Nonspecific ST - T abnormalities    < end of copied text > Surgeon: Anant Bui    Requesting Physician: Dr. Hill (ED attending)    HISTORY OF PRESENT ILLNESS (Need 4):    This is a 43 year old male who is known to our service, s/p Type A aortic dissection repair on 5/9/19 with Dr. Smith.  He has no other known PMHx prior to his aneurysm.  His post-operative course was complicated by paroxysmal afib and pt was started on amiodarone, beta blockers and NOAC, EPS team was consulted.  On POD#7, s/p successful DCCV.  He was discharged home on POD #8.  He has been home since then and states that he's been walking and tolerating PO diet, normal bowel habits.  He states that in the "beginning of June" he started to get episodes of "double vision" It happens when he's just sitting down watching T.V., or walking around his house. It is self-limiting and only lasts 2-3 minutes.  This has been going on since then, occurring about 3-5 times per week.  Additionally starting on June 26th, he began to get "dizzy spells" where he feels dizzy like the "room is spinning" also about 4-5 times per week, but not at the same time as the blurry vision.  The dizziness lasts about <5 seconds and is also self-limiting.  He does not feel like he's going to faint and denies any LOC episodes.  Denies SOB, chest pain, incisional pain, FRANCIS, LE edema, palpitations, fever/chills or N/V/D/C.  He informed Dr. Smith's NP about the episodes at one point and was told to come to the ED if his symptoms got worse.  He also followed up with Dr. Aj from EPS and had a loop recorder implanted 1 week ago.      PAST MEDICAL & SURGICAL HISTORY:    Thoracic aortic aneurysm  Atrial fibrillation.    MEDICATIONS  (STANDING):  sodium chloride 0.9%. 1000 milliLiter(s) (250 mL/Hr) IV Continuous <Continuous>    MEDICATIONS  (PRN):      Allergies    No Known Allergies    Intolerances        SOCIAL HISTORY:  Smoker:  No  ETOH use:  No  Ilicit Drug use:  No  Occupation: Works for [employer], but still out on leave.   Assisted device use (Cane / Walker): No  Live with: Has a girlfriend.     FAMILY HISTORY:  No pertinent family history in first degree relatives      Review of Systems (Need 10):  CONSTITUTIONAL: Denies fevers / chills, sweats, fatigue, weight loss, weight gain                                       NEURO:  +dizziness, +double vision.  Denies parathesias, seizures, syncope, confusion                                                                                  EYES:  +double vision.  Denies discharge from his eyes, pain, loss of vision                                                                                    ENMT:  Denies difficulty hearing, vertigo, dysphagia, epistaxis, recent dental work                                       CV:  Denies chest pain, palpitations, FRANCIS, orthopnea                                                                                           RESPIRATORY:  Denies wWheezing, SOB, cough / sputum, hemoptysis                                                               GI:  Denies nausea, vomiting, diarrhea, constipation, melena                                                                          : Denies hematuria, dysuria, urgency, incontinence                                                                                          MUSKULOSKELETAL:  Denies arthritis, joint swelling, muscle weakness                                                             SKIN/BREAST:  Denies rash, itching, hair loss, masses                                                                                              PSYCH:  Denies depression, anxiety, suicidal ideation                                                                                                HEME/LYMPH:  Denies bruises easily, enlarged lymph nodes, tender lymph nodes                                          ENDOCRINE:  Denies cold intolerance, heat intolerance, polydipsia                                                                      Vital Signs Last 24 Hrs  T(C): 36.7 (15 Jul 2019 09:07), Max: 36.7 (15 Jul 2019 08:55)  T(F): 98 (15 Jul 2019 09:07), Max: 98.1 (15 Jul 2019 08:55)  HR: 62 (15 Jul 2019 09:18) (62 - 82)  BP: 176/95 (15 Jul 2019 09:18) (164/98 - 176/95)  BP(mean): --  RR: 18 (15 Jul 2019 09:07) (18 - 18)  SpO2: 100% (15 Jul 2019 09:07) (100% - 100%)    Physical Exam  GEN: NAD, looks comfortable, sitting up in the stretcher.  Cooperative.    Psych: Mood appropriate  Neuro: A&Ox3.  No focal deficits.  Moving all extremities.   HEENT: No obvious abnormalities  CV: S1S2, regular, no murmurs appreciated.  No carotid bruits.  No JVD  Lungs: Clear B/L.  No wheezing, rales or rhonchi  ABD: Soft, non-tender, non-distended.  +Bowel sounds  EXT: Warm and well perfused.  No peripheral edema noted  Musculoskeletal: Moving all extremities with normal ROM, no joint swelling  PV: Pedal pulses palpable  Sternal incision and CT sites well-healed.  Small keloid scar overlying MSI.                                                             LABS:                        11.1   5.44  )-----------( 305      ( 15 Jul 2019 09:19 )             36.8     07-15    138  |  101  |  16  ----------------------------<  97  4.0   |  26  |  1.29    Ca    9.5      15 Jul 2019 09:19    TPro  8.0  /  Alb  3.9  /  TBili  0.2  /  DBili  x   /  AST  16  /  ALT  14  /  AlkPhos  48  07-15    PT/INR - ( 15 Jul 2019 09:19 )   PT: 14.7 sec;   INR: 1.29          PTT - ( 15 Jul 2019 09:19 )  PTT:37.6 sec    CARDIAC MARKERS ( 15 Jul 2019 09:19 )  x     / <0.01 ng/mL / x     / x     / x              RADIOLOGY & ADDITIONAL STUDIES:  < from: 12 Lead ECG (05.10.19 @ 00:43) >  Diagnosis Line Sinus rhythm with 1st degree AV block  Nonspecific ST - T abnormalities    < end of copied text >    CXR 7/15/19 ?Some haziness left lung, but no clear sign of pleural effusion or pneumothorax.  No blunting of the costophrenic angle. Official reading pending.

## 2019-07-15 NOTE — ED PROVIDER NOTE - OBJECTIVE STATEMENT
44 y/o m with PMH of thoracic aortic dissection repair 5/9 presents complicated by postop course of atrial fib presents with intermittent dizziness and double vision since June.  Pt states symptoms last less than 1 minute.  Denies headache, neck pain, chest pain, back pain.  Dr. Smith NP aware of symptoms and states to present to ED if symptoms worsen.  Today pt sitting on cough and had dizziness.  In ED denies symptoms, but states he feels anxious.  No extremity weakness or paresthesias.

## 2019-07-15 NOTE — ED PROVIDER NOTE - CARE PROVIDER_API CALL
Candace Garner)  Neurology; Vascular Neurology  [address], [address] [place] [id number]  Phone: [phone]  Fax: [phone]  Follow Up Time:     Taylor Rey)  Neurology  [address], [address] [place] [id number]  Phone: [phone]  Fax: [phone]  Follow Up Time:

## 2019-07-15 NOTE — ED PROVIDER NOTE - CLINICAL SUMMARY MEDICAL DECISION MAKING FREE TEXT BOX
Pt with recent aortic dissection repair now with intermittent dizziness and double vision - no symptoms in ED - PE normal without neuro deficits, Pt htn but most likely secondary to anxiety as pt very anxious in ED.  CT PA at bedside and agrees on CTA and bedside echo r/o effusion - will also consult EP to interrogate loop recorder.

## 2019-07-15 NOTE — ED ADULT NURSE NOTE - OBJECTIVE STATEMENT
Presented to ED complaining of "dizziness at 7am that lasted about 1 minute. But ever since my aortic dissection repair on 5/9 I've been having off and on blurry vision and dizziness. I was told by my cardiologist that if ever I feel uncomfortable just come to the ED." AA&Ox4. Breathing on room air. Denies chest pain, SOB, nausea, fever, palpitations, weakness, LOC, numbness or tingling sensations. Noted ambulating with steady gait. Placed patient on cardiac monitoring. Noted hypertensive. Verbalized taking BP medications this morning but did not take blood thinner.

## 2019-07-15 NOTE — ED PROVIDER NOTE - NSFOLLOWUPINSTRUCTIONS_ED_ALL_ED_FT
Follow up with neurologist recommended below.    Take your regularly prescribed medications.    Return to ED with worsening symptoms or other concerns.Dizziness  Dizziness is a common problem. It is a feeling of unsteadiness or light-headedness. You may feel like you are about to faint. Dizziness can lead to injury if you stumble or fall. Anyone can become dizzy, but dizziness is more common in older adults. This condition can be caused by a number of things, including medicines, dehydration, or illness.    Follow these instructions at home:  Eating and drinking     Drink enough fluid to keep your urine clear or pale yellow. This helps to keep you from becoming dehydrated. Try to drink more clear fluids, such as water.  Do not drink alcohol.  Limit your caffeine intake if told to do so by your health care provider. Check ingredients and nutrition facts to see if a food or beverage contains caffeine.  Limit your salt (sodium) intake if told to do so by your health care provider. Check ingredients and nutrition facts to see if a food or beverage contains sodium.  Activity     Avoid making quick movements.  Rise slowly from chairs and steady yourself until you feel okay.  In the morning, first sit up on the side of the bed. When you feel okay, stand slowly while you hold onto something until you know that your balance is fine.  If you need to  one place for a long time, move your legs often. Tighten and relax the muscles in your legs while you are standing.  Do not drive or use heavy machinery if you feel dizzy.  Avoid bending down if you feel dizzy. Place items in your home so that they are easy for you to reach without leaning over.  Lifestyle     Do not use any products that contain nicotine or tobacco, such as cigarettes and e-cigarettes. If you need help quitting, ask your health care provider.  Try to reduce your stress level by using methods such as yoga or meditation. Talk with your health care provider if you need help to manage your stress.  General instructions     Watch your dizziness for any changes.  Take over-the-counter and prescription medicines only as told by your health care provider. Talk with your health care provider if you think that your dizziness is caused by a medicine that you are taking.  Tell a friend or a family member that you are feeling dizzy. If he or she notices any changes in your behavior, have this person call your health care provider.  Keep all follow-up visits as told by your health care provider. This is important.  Contact a health care provider if:  Your dizziness does not go away.  Your dizziness or light-headedness gets worse.  You feel nauseous.  You have reduced hearing.  You have new symptoms.  You are unsteady on your feet or you feel like the room is spinning.  Get help right away if:  You vomit or have diarrhea and are unable to eat or drink anything.  You have problems talking, walking, swallowing, or using your arms, hands, or legs.  You feel generally weak.  You are not thinking clearly or you have trouble forming sentences. It may take a friend or family member to notice this.  You have chest pain, abdominal pain, shortness of breath, or sweating.  Your vision changes.  You have any bleeding.  You have a severe headache.  You have neck pain or a stiff neck.  You have a fever.  These symptoms may represent a serious problem that is an emergency. Do not wait to see if the symptoms will go away. Get medical help right away. Call your local emergency services (911 in the [place] Do not drive yourself to the hospital.     Summary  Dizziness is a feeling of unsteadiness or light-headedness. This condition can be caused by a number of things, including medicines, dehydration, or illness.  Anyone can become dizzy, but dizziness is more common in older adults.  Drink enough fluid to keep your urine clear or pale yellow. Do not drink alcohol.  Avoid making quick movements if you feel dizzy. Monitor your dizziness for any changes.  This information is not intended to replace advice given to you by your health care provider. Make sure you discuss any questions you have with your health care provider.    Document Released: 06/13/2002 Document Revised: 01/20/2018 Document Reviewed: 01/20/2018  [employer] Interactive Patient Education © 2019 [employer]

## 2019-07-15 NOTE — PROGRESS NOTE ADULT - SUBJECTIVE AND OBJECTIVE BOX
EPS Device interrogation    Indication: dizziness and blurred vision    Device model: [employer] Confirm				Implanting Physician: Madai  		  Presenting Rhythm: NSR    Events/Alert:  none    Episode counts:  AF 0  Tachy 0  Charles 0  Pause 0    Will discuss with EPS attending.   [ ]EPS attending: Interrogation reviewed. Agree with above.

## 2019-07-16 ENCOUNTER — INBOUND DOCUMENT (OUTPATIENT)
Age: 44
End: 2019-07-16

## 2019-07-17 ENCOUNTER — APPOINTMENT (OUTPATIENT)
Dept: NEUROLOGY | Facility: CLINIC | Age: 44
End: 2019-07-17
Payer: COMMERCIAL

## 2019-07-17 VITALS
OXYGEN SATURATION: 100 % | WEIGHT: 246 LBS | DIASTOLIC BLOOD PRESSURE: 87 MMHG | SYSTOLIC BLOOD PRESSURE: 129 MMHG | HEIGHT: 71 IN | HEART RATE: 74 BPM | BODY MASS INDEX: 34.44 KG/M2

## 2019-07-17 DIAGNOSIS — H53.2 DIPLOPIA: ICD-10-CM

## 2019-07-17 DIAGNOSIS — R42 DIZZINESS AND GIDDINESS: ICD-10-CM

## 2019-07-17 DIAGNOSIS — R93.0 ABNORMAL FINDINGS ON DIAGNOSTIC IMAGING OF SKULL AND HEAD, NOT ELSEWHERE CLASSIFIED: ICD-10-CM

## 2019-07-17 PROCEDURE — 99205 OFFICE O/P NEW HI 60 MIN: CPT

## 2019-07-17 RX ORDER — ASPIRIN ENTERIC COATED TABLETS 81 MG 81 MG/1
81 TABLET, DELAYED RELEASE ORAL DAILY
Qty: 30 | Refills: 3 | Status: DISCONTINUED | COMMUNITY
Start: 2019-05-21 | End: 2019-07-17

## 2019-07-17 RX ORDER — DOCUSATE SODIUM 100 MG/1
100 CAPSULE ORAL 3 TIMES DAILY
Qty: 90 | Refills: 0 | Status: DISCONTINUED | COMMUNITY
Start: 2019-05-21 | End: 2019-07-17

## 2019-07-17 RX ORDER — FERROUS SULFATE 325(65) MG
325 (65 FE) TABLET ORAL DAILY
Qty: 30 | Refills: 0 | Status: DISCONTINUED | COMMUNITY
Start: 2019-05-23 | End: 2019-07-17

## 2019-07-17 RX ORDER — AMIODARONE HYDROCHLORIDE 200 MG/1
200 TABLET ORAL DAILY
Qty: 30 | Refills: 0 | Status: DISCONTINUED | COMMUNITY
Start: 2019-05-21 | End: 2019-07-17

## 2019-07-21 DIAGNOSIS — E51.9 THIAMINE DEFICIENCY, UNSPECIFIED: ICD-10-CM

## 2019-07-29 PROBLEM — E51.9 THIAMINE DEFICIENCY: Status: ACTIVE | Noted: 2019-07-29

## 2019-08-02 ENCOUNTER — APPOINTMENT (OUTPATIENT)
Dept: OPHTHALMOLOGY | Facility: CLINIC | Age: 44
End: 2019-08-02
Payer: COMMERCIAL

## 2019-08-02 ENCOUNTER — NON-APPOINTMENT (OUTPATIENT)
Age: 44
End: 2019-08-02

## 2019-08-02 PROCEDURE — 92060 SENSORIMOTOR EXAMINATION: CPT

## 2019-08-02 PROCEDURE — 92134 CPTRZ OPH DX IMG PST SGM RTA: CPT

## 2019-08-02 PROCEDURE — 92004 COMPRE OPH EXAM NEW PT 1/>: CPT

## 2019-08-12 ENCOUNTER — APPOINTMENT (OUTPATIENT)
Dept: HEART AND VASCULAR | Facility: CLINIC | Age: 44
End: 2019-08-12
Payer: COMMERCIAL

## 2019-08-12 VITALS
DIASTOLIC BLOOD PRESSURE: 65 MMHG | HEIGHT: 71 IN | HEART RATE: 62 BPM | SYSTOLIC BLOOD PRESSURE: 122 MMHG | WEIGHT: 257 LBS | BODY MASS INDEX: 35.98 KG/M2

## 2019-08-12 PROCEDURE — 93285 PRGRMG DEV EVAL SCRMS IP: CPT

## 2019-08-12 PROCEDURE — 99213 OFFICE O/P EST LOW 20 MIN: CPT | Mod: 25

## 2019-08-12 NOTE — PHYSICAL EXAM
[General Appearance - Well Developed] : well developed [Normal Appearance] : normal appearance [Well Groomed] : well groomed [General Appearance - Well Nourished] : well nourished [No Deformities] : no deformities [General Appearance - In No Acute Distress] : no acute distress [Normal Conjunctiva] : the conjunctiva exhibited no abnormalities [Eyelids - No Xanthelasma] : the eyelids demonstrated no xanthelasmas [Normal Oral Mucosa] : normal oral mucosa [No Oral Pallor] : no oral pallor [No Oral Cyanosis] : no oral cyanosis [Normal Jugular Venous A Waves Present] : normal jugular venous A waves present [Normal Jugular Venous V Waves Present] : normal jugular venous V waves present [No Jugular Venous Bah A Waves] : no jugular venous bah A waves [Respiration, Rhythm And Depth] : normal respiratory rhythm and effort [Exaggerated Use Of Accessory Muscles For Inspiration] : no accessory muscle use [Auscultation Breath Sounds / Voice Sounds] : lungs were clear to auscultation bilaterally [Heart Rate And Rhythm] : heart rate and rhythm were normal [Heart Sounds] : normal S1 and S2 [Murmurs] : no murmurs present [Abdomen Soft] : soft [Abdomen Tenderness] : non-tender [Abdomen Mass (___ Cm)] : no abdominal mass palpated [Abnormal Walk] : normal gait [Gait - Sufficient For Exercise Testing] : the gait was sufficient for exercise testing [Nail Clubbing] : no clubbing of the fingernails [Cyanosis, Localized] : no localized cyanosis [Petechial Hemorrhages (___cm)] : no petechial hemorrhages [] : no ischemic changes [Affect] : the affect was normal [Oriented To Time, Place, And Person] : oriented to person, place, and time [Mood] : the mood was normal [No Anxiety] : not feeling anxious

## 2019-08-14 NOTE — DISCUSSION/SUMMARY
[FreeTextEntry1] : Mr. Dawn is a 43 year-old gentleman s/p Type A aortic dissection repair complicated by post-operative atrial fibrillation.  He is s/p ILR placement for long term heart rhythm assessment.  No arrhythmia events noted on interrogation.  Symptom events correlate with SR.  No medication changes recommended today.  Advised to continue Xarelto until he has no documented arrhythmia for six months.  Advised to follow-up in six months and call with any questions or concerns in the interim.

## 2019-08-14 NOTE — HISTORY OF PRESENT ILLNESS
[FreeTextEntry1] : Mr. Dawn is a pleasant 43 year-old gentleman with a history of acute Type A dissection s/p surgical repair with Dr. Smith 5/9/19.   Post-op course was complicated by atrial fibrillation; he was started on Amiodarone and is s/p successful DCCV 5/16/19.  He is s/p ILR placement 7/8/19 for long-term heart rhythm assessment. He has used the symptom activator when he has felt dizzy.  No palpitations, presyncope/syncope.  Reports compliance with medications, including Xarelto, and denies any bleeding issues.

## 2019-08-25 ENCOUNTER — FORM ENCOUNTER (OUTPATIENT)
Age: 44
End: 2019-08-25

## 2019-08-26 ENCOUNTER — APPOINTMENT (OUTPATIENT)
Dept: MRI IMAGING | Facility: HOSPITAL | Age: 44
End: 2019-08-26
Payer: COMMERCIAL

## 2019-08-26 ENCOUNTER — OUTPATIENT (OUTPATIENT)
Dept: OUTPATIENT SERVICES | Facility: HOSPITAL | Age: 44
LOS: 1 days | End: 2019-08-26
Payer: COMMERCIAL

## 2019-08-26 PROCEDURE — 70551 MRI BRAIN STEM W/O DYE: CPT | Mod: 26

## 2019-08-26 PROCEDURE — 70551 MRI BRAIN STEM W/O DYE: CPT

## 2019-08-26 PROCEDURE — 70544 MR ANGIOGRAPHY HEAD W/O DYE: CPT | Mod: 26,59

## 2019-08-26 PROCEDURE — A9585: CPT

## 2019-08-26 PROCEDURE — 70548 MR ANGIOGRAPHY NECK W/DYE: CPT | Mod: 26

## 2019-08-26 PROCEDURE — 70544 MR ANGIOGRAPHY HEAD W/O DYE: CPT

## 2019-08-26 PROCEDURE — 70548 MR ANGIOGRAPHY NECK W/DYE: CPT

## 2019-09-12 ENCOUNTER — APPOINTMENT (OUTPATIENT)
Dept: NEUROLOGY | Facility: CLINIC | Age: 44
End: 2019-09-12

## 2019-09-19 RX ORDER — RIVAROXABAN 20 MG/1
20 TABLET, FILM COATED ORAL
Qty: 30 | Refills: 5 | Status: COMPLETED | COMMUNITY
Start: 2019-05-21 | End: 2019-09-19

## 2019-09-26 ENCOUNTER — EMERGENCY (EMERGENCY)
Facility: HOSPITAL | Age: 44
LOS: 1 days | Discharge: HOME | End: 2019-09-26
Attending: EMERGENCY MEDICINE | Admitting: EMERGENCY MEDICINE
Payer: COMMERCIAL

## 2019-09-26 VITALS
TEMPERATURE: 98 F | WEIGHT: 259.93 LBS | OXYGEN SATURATION: 100 % | RESPIRATION RATE: 16 BRPM | DIASTOLIC BLOOD PRESSURE: 81 MMHG | HEIGHT: 71 IN | SYSTOLIC BLOOD PRESSURE: 149 MMHG | HEART RATE: 79 BPM

## 2019-09-26 VITALS
OXYGEN SATURATION: 98 % | DIASTOLIC BLOOD PRESSURE: 92 MMHG | SYSTOLIC BLOOD PRESSURE: 161 MMHG | HEART RATE: 61 BPM | RESPIRATION RATE: 16 BRPM | TEMPERATURE: 98 F

## 2019-09-26 DIAGNOSIS — Z98.890 OTHER SPECIFIED POSTPROCEDURAL STATES: Chronic | ICD-10-CM

## 2019-09-26 LAB
ALBUMIN SERPL ELPH-MCNC: 3.7 G/DL — SIGNIFICANT CHANGE UP (ref 3.3–5)
ALP SERPL-CCNC: 46 U/L — SIGNIFICANT CHANGE UP (ref 40–120)
ALT FLD-CCNC: 9 U/L — LOW (ref 10–45)
ANION GAP SERPL CALC-SCNC: 11 MMOL/L — SIGNIFICANT CHANGE UP (ref 5–17)
ANISOCYTOSIS BLD QL: SIGNIFICANT CHANGE UP
APTT BLD: 39.9 SEC — HIGH (ref 27.5–36.3)
AST SERPL-CCNC: 17 U/L — SIGNIFICANT CHANGE UP (ref 10–40)
BASOPHILS # BLD AUTO: 0.11 K/UL — SIGNIFICANT CHANGE UP (ref 0–0.2)
BASOPHILS NFR BLD AUTO: 1.7 % — SIGNIFICANT CHANGE UP (ref 0–2)
BILIRUB SERPL-MCNC: 0.2 MG/DL — SIGNIFICANT CHANGE UP (ref 0.2–1.2)
BUN SERPL-MCNC: 15 MG/DL — SIGNIFICANT CHANGE UP (ref 7–23)
BURR CELLS BLD QL SMEAR: PRESENT — SIGNIFICANT CHANGE UP
CALCIUM SERPL-MCNC: 9.9 MG/DL — SIGNIFICANT CHANGE UP (ref 8.4–10.5)
CHLORIDE SERPL-SCNC: 103 MMOL/L — SIGNIFICANT CHANGE UP (ref 96–108)
CO2 SERPL-SCNC: 27 MMOL/L — SIGNIFICANT CHANGE UP (ref 22–31)
CREAT SERPL-MCNC: 1.22 MG/DL — SIGNIFICANT CHANGE UP (ref 0.5–1.3)
DACRYOCYTES BLD QL SMEAR: SLIGHT — SIGNIFICANT CHANGE UP
EOSINOPHIL # BLD AUTO: 0.06 K/UL — SIGNIFICANT CHANGE UP (ref 0–0.5)
EOSINOPHIL NFR BLD AUTO: 0.9 % — SIGNIFICANT CHANGE UP (ref 0–6)
GIANT PLATELETS BLD QL SMEAR: PRESENT — SIGNIFICANT CHANGE UP
GLUCOSE SERPL-MCNC: 101 MG/DL — HIGH (ref 70–99)
HCT VFR BLD CALC: 39.6 % — SIGNIFICANT CHANGE UP (ref 39–50)
HGB BLD-MCNC: 11.7 G/DL — LOW (ref 13–17)
HYPOCHROMIA BLD QL: SLIGHT — SIGNIFICANT CHANGE UP
INR BLD: 1.48 — HIGH (ref 0.88–1.16)
LYMPHOCYTES # BLD AUTO: 2.13 K/UL — SIGNIFICANT CHANGE UP (ref 1–3.3)
LYMPHOCYTES # BLD AUTO: 33.9 % — SIGNIFICANT CHANGE UP (ref 13–44)
MANUAL SMEAR VERIFICATION: SIGNIFICANT CHANGE UP
MCHC RBC-ENTMCNC: 19.5 PG — LOW (ref 27–34)
MCHC RBC-ENTMCNC: 29.5 GM/DL — LOW (ref 32–36)
MCV RBC AUTO: 65.9 FL — LOW (ref 80–100)
MICROCYTES BLD QL: SIGNIFICANT CHANGE UP
MONOCYTES # BLD AUTO: 0.16 K/UL — SIGNIFICANT CHANGE UP (ref 0–0.9)
MONOCYTES NFR BLD AUTO: 2.6 % — SIGNIFICANT CHANGE UP (ref 2–14)
NEUTROPHILS # BLD AUTO: 3.82 K/UL — SIGNIFICANT CHANGE UP (ref 1.8–7.4)
NEUTROPHILS NFR BLD AUTO: 60.9 % — SIGNIFICANT CHANGE UP (ref 43–77)
OVALOCYTES BLD QL SMEAR: SIGNIFICANT CHANGE UP
PLAT MORPH BLD: ABNORMAL
PLATELET # BLD AUTO: 293 K/UL — SIGNIFICANT CHANGE UP (ref 150–400)
PLATELET COUNT - ESTIMATE: NORMAL — SIGNIFICANT CHANGE UP
POIKILOCYTOSIS BLD QL AUTO: SIGNIFICANT CHANGE UP
POTASSIUM SERPL-MCNC: 4.4 MMOL/L — SIGNIFICANT CHANGE UP (ref 3.5–5.3)
POTASSIUM SERPL-SCNC: 4.4 MMOL/L — SIGNIFICANT CHANGE UP (ref 3.5–5.3)
PROT SERPL-MCNC: 7.9 G/DL — SIGNIFICANT CHANGE UP (ref 6–8.3)
PROTHROM AB SERPL-ACNC: 16.9 SEC — HIGH (ref 10–12.9)
RBC # BLD: 6.01 M/UL — HIGH (ref 4.2–5.8)
RBC # FLD: 20.3 % — HIGH (ref 10.3–14.5)
RBC BLD AUTO: ABNORMAL
SODIUM SERPL-SCNC: 141 MMOL/L — SIGNIFICANT CHANGE UP (ref 135–145)
TROPONIN T SERPL-MCNC: <0.01 NG/ML — SIGNIFICANT CHANGE UP (ref 0–0.01)
WBC # BLD: 6.27 K/UL — SIGNIFICANT CHANGE UP (ref 3.8–10.5)
WBC # FLD AUTO: 6.27 K/UL — SIGNIFICANT CHANGE UP (ref 3.8–10.5)

## 2019-09-26 PROCEDURE — 85610 PROTHROMBIN TIME: CPT

## 2019-09-26 PROCEDURE — 99284 EMERGENCY DEPT VISIT MOD MDM: CPT | Mod: 25

## 2019-09-26 PROCEDURE — 99285 EMERGENCY DEPT VISIT HI MDM: CPT

## 2019-09-26 PROCEDURE — 93005 ELECTROCARDIOGRAM TRACING: CPT

## 2019-09-26 PROCEDURE — 85730 THROMBOPLASTIN TIME PARTIAL: CPT

## 2019-09-26 PROCEDURE — 80053 COMPREHEN METABOLIC PANEL: CPT

## 2019-09-26 PROCEDURE — 84484 ASSAY OF TROPONIN QUANT: CPT

## 2019-09-26 PROCEDURE — 93010 ELECTROCARDIOGRAM REPORT: CPT

## 2019-09-26 PROCEDURE — 74174 CTA ABD&PLVS W/CONTRAST: CPT | Mod: 26

## 2019-09-26 PROCEDURE — 85025 COMPLETE CBC W/AUTO DIFF WBC: CPT

## 2019-09-26 PROCEDURE — 36415 COLL VENOUS BLD VENIPUNCTURE: CPT

## 2019-09-26 PROCEDURE — 71275 CT ANGIOGRAPHY CHEST: CPT | Mod: 26

## 2019-09-26 PROCEDURE — 74174 CTA ABD&PLVS W/CONTRAST: CPT

## 2019-09-26 PROCEDURE — 71275 CT ANGIOGRAPHY CHEST: CPT

## 2019-09-26 RX ORDER — RIVAROXABAN 15 MG-20MG
1 KIT ORAL
Qty: 0 | Refills: 0 | DISCHARGE

## 2019-09-26 RX ORDER — OXYCODONE AND ACETAMINOPHEN 5; 325 MG/1; MG/1
1 TABLET ORAL ONCE
Refills: 0 | Status: DISCONTINUED | OUTPATIENT
Start: 2019-09-26 | End: 2019-09-26

## 2019-09-26 RX ORDER — LOSARTAN POTASSIUM 100 MG/1
1 TABLET, FILM COATED ORAL
Qty: 0 | Refills: 0 | DISCHARGE

## 2019-09-26 RX ADMIN — OXYCODONE AND ACETAMINOPHEN 1 TABLET(S): 5; 325 TABLET ORAL at 09:24

## 2019-09-26 RX ADMIN — OXYCODONE AND ACETAMINOPHEN 1 TABLET(S): 5; 325 TABLET ORAL at 08:39

## 2019-09-26 NOTE — ED PROVIDER NOTE - MUSCULOSKELETAL, MLM
Spine appears normal, range of motion is not limited, no muscle or joint tenderness. back non tender. strength 5/5 b/l UE. sensation intact. pulses equal b/l

## 2019-09-26 NOTE — ED PROVIDER NOTE - DISCHARGE DATE
I have reviewed discharge instructions with the patient. The patient verbalized understanding. Patient left ED via Discharge Method: ambulatory to Home with wife. Opportunity for questions and clarification provided. Patient given 0 scripts. To continue your aftercare when you leave the hospital, you may receive an automated call from our care team to check in on how you are doing. This is a free service and part of our promise to provide the best care and service to meet your aftercare needs.  If you have questions, or wish to unsubscribe from this service please call [phone]. Thank you for Choosing our [facility] Emergency Department.
26-Sep-2019

## 2019-09-26 NOTE — ED PROVIDER NOTE - PATIENT PORTAL LINK FT
You can access the FollowMyHealth Patient Portal offered by [facility] by registering at the following website: [email or web address]. By joining [employer]’s FollowMyHealth portal, you will also be able to view your health information using other applications (apps) compatible with our system.

## 2019-09-26 NOTE — CONSULT NOTE ADULT - ASSESSMENT
43 year old male who is known to our service, s/p Type A aortic dissection repair on 5/9/19 with Dr. Smith, presented to ED c/o mid-upper back pain x1-2days. CT C/A/P done in ED and Cardiothoracic Surgery, Dr. Smith, consulted for further evaluation.    Plan:  Problem 1: History of aortic dissection s/p Type A aortic dissection repair on 5/9/19  - Case discussed and images reviewed with Dr. Smith  - CT C/A/P results above. Stable.   - Per Dr. Smith, there is no cardiothoracic surgical intervention indicated at this time.     Problem 2: Upper back pain  -  PRN's  - Follow up with primary care physician.     Problem 3: HTN  - Continue home medication.   - Continue to check BP at home and contact Dr. Rios office, or cardiologist, if persistently high systolic blood pressure over 130mmHg.     Problem 4: Loop recorder  - Continue to follow up with EP as per provider.     I have reviewed clinical labs tests and reports, radiology tests and reports, as well as old patient medical records, and discussed with the refering physician.

## 2019-09-26 NOTE — ED PROVIDER NOTE - CLINICAL SUMMARY MEDICAL DECISION MAKING FREE TEXT BOX
upper back pain with hx of dissection. pain x 1 day. ecg no ischemic changes, troponin negative. cta done and no acute pathology. suspect muscular pain. pt resolved with pain medication in ED. neuro exam intact. pt evaluated in ED by CT surgery and no acute intervention at this time. f/u as outpt.

## 2019-09-26 NOTE — ED ADULT TRIAGE NOTE - CHIEF COMPLAINT QUOTE
Presents to ED for mid back pain x 1 day.  Patient reports history of aortic dissection on 5/9/19 which prompted him to be evaluated for pain.

## 2019-09-26 NOTE — ED PROVIDER NOTE - NSFOLLOWUPINSTRUCTIONS_ED_ALL_ED_FT
Follow up with your physician this week.       Log Out.    [employer]® CareNotes®     :  [facility]             BACK PAIN - AfterCare(R) Instructions(ER/ED)     Back Pain    WHAT YOU NEED TO KNOW:    Back pain is common. It can be caused by many conditions, such as arthritis or the breakdown of spinal discs. Your risk for back pain is increased by injuries, lack of activity, or repeated bending and twisting. You may feel sore or stiff on one or both sides of your back. The pain may spread to your buttocks or thighs.    DISCHARGE INSTRUCTIONS:    Return to the emergency department if:     You have pain, numbness, or weakness in one or both legs.      Your pain becomes so severe that you cannot walk.      You cannot control your urine or bowel movements.      You have severe back pain with chest pain.      You have severe back pain, nausea, and vomiting.      You have severe back pain that spreads to your side or genital area.    Contact your healthcare provider if:     You have back pain that does not get better with rest and pain medicine.      You have a fever.      You have pain that worsens when you are on your back or when you rest.      You have pain that worsens when you cough or sneeze.      You lose weight without trying.      You have questions or concerns about your condition or care.    Medicines:     NSAIDs help decrease swelling and pain. This medicine is available with or without a doctor's order. NSAIDs can cause stomach bleeding or kidney problems in certain people. If you take blood thinner medicine, always ask your healthcare provider if NSAIDs are safe for you. Always read the medicine label and follow directions.      Acetaminophen decreases pain and fever. It is available without a doctor's order. Ask how much to take and how often to take it. Follow directions. Read the labels of all other medicines you are using to see if they also contain acetaminophen, or ask your doctor or pharmacist. Acetaminophen can cause liver damage if not taken correctly. Do not use more than 4 grams (4,000 milligrams) total of acetaminophen in one day.       Muscle relaxers help decrease muscle spasms and back pain.      Prescription pain medicine may be given. Ask your healthcare provider how to take this medicine safely. Some prescription pain medicines contain acetaminophen. Do not take other medicines that contain acetaminophen without talking to your healthcare provider. Too much acetaminophen may cause liver damage. Prescription pain medicine may cause constipation. Ask your healthcare provider how to prevent or treat constipation.       Take your medicine as directed. Contact your healthcare provider if you think your medicine is not helping or if you have side effects. Tell him or her if you are allergic to any medicine. Keep a list of the medicines, vitamins, and herbs you take. Include the amounts, and when and why you take them. Bring the list or the pill bottles to follow-up visits. Carry your medicine list with you in case of an emergency.    How to manage your back pain:     Apply ice on your back for 15 to 20 minutes every hour or as directed. Use an ice pack, or put crushed ice in a plastic bag. Cover it with a towel before you apply it to your skin. Ice helps prevent tissue damage and decreases pain.      Apply heat on your back for 20 to 30 minutes every 2 hours for as many days as directed. Heat helps decrease pain and muscle spasms.      Stay active as much as you can without causing more pain. Bed rest could make your back pain worse. Avoid heavy lifting until your pain is gone.      Go to physical therapy as directed. A physical therapist can teach you exercises to help improve movement and strength, and to decrease pain.    Follow up with your healthcare provider in 2 weeks, or as directed: Write down your questions so you remember to ask them during your visits.       © Copyright [employer] 2019 All illustrations and images included in CareNotes are the copyrighted property of [employer] or [employer].      back to top                      © Copyright [employer] 2019

## 2019-09-26 NOTE — ED PROVIDER NOTE - ATTENDING CONTRIBUTION TO CARE
42 y/o man wih hx HTN and aortic dissection repair presents with c/o CP and back pain    1) Ct today no acute aortic dissection findings    2) pt evaluated by CT surgery and cleared for d/c to home.

## 2019-09-26 NOTE — CONSULT NOTE ADULT - SUBJECTIVE AND OBJECTIVE BOX
Surgeon: Dr. Smith    Requesting Physician: ED    HISTORY OF PRESENT ILLNESS (Need 4):  43 year old male who is known to our service, s/p Type A aortic dissection repair on 5/9/19 with Dr. Smith.  He has no other known PMHx prior to his aneurysm.  His post-operative course was complicated by paroxysmal afib and pt was started on amiodarone, beta blockers and NOAC, EPS team was consulted.  On POD#7, s/p successful DCCV.  He was discharged home on POD #8.  He was progressing well at home, but stated that on Thursday night he felt a band-like abdominal discomfort described as "gas pains," x3-4 days. That pain resolved on its own, but then he started having mid-upper back pain, described as achy pain 7/10 in severity, non-radiating, lasting less than 30 seconds at a time. Patient states the pain happens randomly and he is unsure what brings the pain on. He did not take anything at home for the pain. At time of visit, he denies upper back pain. Denies chest pain, SOB, palpitations, hemopytsis, N/V/D, abdominal pain, dizziness, fever or chills. CT C/A/P done in ED and Cardiothoracic Surgery, Dr. Smith, consulted for further evaluation.       PAST MEDICAL & SURGICAL HISTORY:  HTN (hypertension)  Aortic dissection  No pertinent past medical history  History of loop recorder  S/P aortic dissection repair      MEDICATIONS  (STANDING):    MEDICATIONS  (PRN):      Allergies    No Known Allergies    Intolerances    SOCIAL HISTORY:  Smoker:  No  ETOH use:  No  Ilicit Drug use:  No  Occupation: Works for [employer]  Assisted device use (Cane / Walker): No  Live with: Has a girlfriend.         FAMILY HISTORY:  No pertinent family history in first degree relatives      Review of Systems (Need 10):  CONSTITUTIONAL: +Intentional weight loss, Denies fevers / chills, sweats, fatigue, weight gain                                       NEURO:  Denies parathesias, seizures, syncope, confusion                                                                                  EYES:  Denies blurry vision, discharge, pain, loss of vision                                                                                    ENMT:  Denies difficulty hearing, vertigo, dysphagia, epistaxis, recent dental work                                       CV:  Denies chest pain, palpitations, FRANCIS, orthopnea                                                                                           RESPIRATORY:  Denies Wheezing, SOB, cough / sputum, hemoptysis                                                               GI:  Denies nausea, vomiting, diarrhea, constipation, melena                                                                          : Denies hematuria, dysuria, urgency, incontinence                                                                                          MUSKULOSKELETAL:  +Upper back pain, Denies arthritis, joint swelling, muscle weakness                                                             SKIN/BREAST:  Denies rash, itching, hair loss, masses                                                                                              PSYCH:  Denies depression, anxiety, suicidal ideation                                                                                                HEME/LYMPH:  Denies bruises easily, enlarged lymph nodes, tender lymph nodes                                          ENDOCRINE:  Denies cold intolerance, heat intolerance, polydipsia                                                                      Vital Signs Last 24 Hrs  T(C): 36.8 (26 Sep 2019 16:25), Max: 36.9 (26 Sep 2019 07:12)  T(F): 98.2 (26 Sep 2019 16:25), Max: 98.4 (26 Sep 2019 07:12)  HR: 61 (26 Sep 2019 16:25) (53 - 79)  BP: 161/92 (26 Sep 2019 16:25) (146/76 - 161/92)  BP(mean): --  RR: 16 (26 Sep 2019 16:25) (16 - 18)  SpO2: 98% (26 Sep 2019 16:25) (98% - 100%)    Physical Exam (Need 8)  CONSTITUTIONAL: Well-nourished, appears stated age, normal affect, male lying comfortably in bed, in no acute distress.   NEURO: CN II-XII grossly intact, A&Ox3, no focal deficits.                 EYES: PERRLA, EOMI, no conjunctival injection  ENMT: Moist mucous membranes, no erythema, no lymphadenopathy, trachea midline.   CV: S1S2, RRR, no murmurs appreciated on exam.   RESPIRATORY: Clear to auscultation bilaterally, no wheezes rales or rhonchi.   GI: Abdomen soft, non tender, non distended, +bowel sounds.   : Deferred  MUSKULOSKELETAL: 5/5 strength b/l, good range of motion in all extremities, no swollen or erythematous joints.   SKIN / BREAST: MSI: healed, +keloid scar superior pole. No sternal click.   VASCULAR: DP/PT pulses 2+ b/l, Radial 2+b/l. Femoral 2+ b/l.                                                            LABS:                        11.7   6.27  )-----------( 293      ( 26 Sep 2019 07:51 )             39.6     09-26    141  |  103  |  15  ----------------------------<  101<H>  4.4   |  27  |  1.22    Ca    9.9      26 Sep 2019 07:51    TPro  7.9  /  Alb  3.7  /  TBili  0.2  /  DBili  x   /  AST  17  /  ALT  9<L>  /  AlkPhos  46  09-26    PT/INR - ( 26 Sep 2019 07:51 )   PT: 16.9 sec;   INR: 1.48          PTT - ( 26 Sep 2019 07:51 )  PTT:39.9 sec    CARDIAC MARKERS ( 26 Sep 2019 07:51 )  x     / <0.01 ng/mL / x     / x     / x              RADIOLOGY & ADDITIONAL STUDIES:      < from: CT Angio Chest w/ IV Cont (09.26.19 @ 10:58) >    IMPRESSION:  Stable evaluation. Aortic dissection, status post graft replacement of   the ascending aorta and TEVAR of descending aorta as seen on prior study.   Evidence of endoleak around the endoluminal stent in the descending   thoracic aorta, unchanged. Persistent nonobstructive dissection flap in   the abdominal aorta and iliac arteries as above.    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      < end of copied text >        EKG: < from: 12 Lead ECG (05.10.19 @ 00:43) >    Ventricular Rate 80 BPM    Atrial Rate 80 BPM    P-R Interval 210 ms    QRS Duration 98 ms    Q-T Interval 402 ms    QTC Calculation(Bezet) 463 ms    P Axis 52 degrees    R Axis 67 degrees    T Axis -44 degrees    Diagnosis Line Sinus rhythm with 1st degree AV block  Nonspecific ST - T abnormalities    < end of copied text >

## 2019-09-26 NOTE — ED PROVIDER NOTE - OBJECTIVE STATEMENT
42 y/o male with hx of HTN, aortic dissection c/o upper back pain x 1 day. pt states pain began to left scapular region yesterday and now to center of back. + dull achy intermittent pain. no cp or sob. no numbness, tingling or weakness. no leg pain or swelling. no abd pain, n/v. no ha or dizziness. pt has not taken any medication for pain. no further complaints.

## 2019-10-02 ENCOUNTER — APPOINTMENT (OUTPATIENT)
Dept: NEUROLOGY | Facility: CLINIC | Age: 44
End: 2019-10-02

## 2019-10-06 DIAGNOSIS — M54.6 PAIN IN THORACIC SPINE: ICD-10-CM

## 2019-10-06 DIAGNOSIS — M54.9 DORSALGIA, UNSPECIFIED: ICD-10-CM

## 2019-10-06 DIAGNOSIS — Z79.899 OTHER LONG TERM (CURRENT) DRUG THERAPY: ICD-10-CM

## 2019-10-06 DIAGNOSIS — I10 ESSENTIAL (PRIMARY) HYPERTENSION: ICD-10-CM

## 2019-10-07 ENCOUNTER — APPOINTMENT (OUTPATIENT)
Dept: OPHTHALMOLOGY | Facility: CLINIC | Age: 44
End: 2019-10-07

## 2019-12-04 ENCOUNTER — APPOINTMENT (OUTPATIENT)
Dept: CARDIOTHORACIC SURGERY | Facility: CLINIC | Age: 44
End: 2019-12-04
Payer: COMMERCIAL

## 2019-12-04 ENCOUNTER — TRANSCRIPTION ENCOUNTER (OUTPATIENT)
Age: 44
End: 2019-12-04

## 2019-12-04 VITALS
HEART RATE: 72 BPM | SYSTOLIC BLOOD PRESSURE: 180 MMHG | BODY MASS INDEX: 36.4 KG/M2 | WEIGHT: 260 LBS | TEMPERATURE: 98.1 F | DIASTOLIC BLOOD PRESSURE: 105 MMHG | OXYGEN SATURATION: 98 % | HEIGHT: 71 IN | RESPIRATION RATE: 18 BRPM

## 2019-12-04 DIAGNOSIS — Z78.9 OTHER SPECIFIED HEALTH STATUS: ICD-10-CM

## 2019-12-04 DIAGNOSIS — I71.03 DISSECTION OF THORACOABDOMINAL AORTA: ICD-10-CM

## 2019-12-04 PROBLEM — I71.00 DISSECTION OF UNSPECIFIED SITE OF AORTA: Chronic | Status: ACTIVE | Noted: 2019-09-26

## 2019-12-04 PROBLEM — I10 ESSENTIAL (PRIMARY) HYPERTENSION: Chronic | Status: ACTIVE | Noted: 2019-09-26

## 2019-12-04 PROCEDURE — 99214 OFFICE O/P EST MOD 30 MIN: CPT

## 2019-12-04 NOTE — PHYSICAL EXAM
[Sclera] : the sclera and conjunctiva were normal [PERRL With Normal Accommodation] : pupils were equal in size, round, and reactive to light [Neck Appearance] : the appearance of the neck was normal [] : no respiratory distress [Apical Impulse] : the apical impulse was normal [Examination Of The Chest] : the chest was normal in appearance [2+] : left 2+ [Abdomen Soft] : soft [Cervical Lymph Nodes Enlarged Posterior Bilaterally] : posterior cervical [Abnormal Walk] : normal gait [Skin Color & Pigmentation] : normal skin color and pigmentation [Deep Tendon Reflexes (DTR)] : deep tendon reflexes were 2+ and symmetric [Oriented To Time, Place, And Person] : oriented to person, place, and time

## 2019-12-17 NOTE — ASSESSMENT
[FreeTextEntry1] : 43 year old male status post type A aortic dissection repair -- aortic valve repair, replacement of the ascending aorta and proximal aortic arch replacement, thoracic stent graft, EF 50% on 5/9/19. He presents today for a followup visit with recent diagnostic imaging.\par \par The patient's medical records and diagnostic images were reviewed at the time of this office visit, and the following recommendation was made. The surgical repair is intact and stable, however the persistent dissection flap along the abdominal aorta must closely be monitored. \par \par Plan:\par 1. Continue medication regimen\par 2. Follow up with PCP and cardiologist\par 3. BP control- I have recommended the patient to monitor his blood pressure closely. I have also advised the patient to take daily blood pressures at home and adhere to medication regimen.\par 4. Return to the [facility] in 1 year with a CTA chest abdomen pelvis \par

## 2019-12-17 NOTE — PROCEDURE
[FreeTextEntry1] : \par Dr. Smith discussed activity restrictions with the patient, and would advise exercise at a moderate amount with no heavy lifting over one third of body weight, and avoiding heart rates that exceed 140 beats per minute. Every patient must abstain from tobacco and illicit drug use, especially stimulants such as cocaine or methamphetamine. The patient was also counseled on maintaining a healthy heart diet, and losing any excessive weight as this also put undue stress on both the aorta and entire cardiovascular system. Patient was counseled on the importance of medication adherence for blood pressure control, as hypertension is a significant risk factor associated with aortic dissections. First degree family members should be screened for bicuspid valve disease, and ascending aortic aneurysms.\par \par

## 2019-12-17 NOTE — DATA REVIEWED
[FreeTextEntry1] : CTA chest abdomen pelvis  9/26/19: stable evaluation, s/p graft replacement of the ascending aorta and TEVAR of the descending aorta, evidence of endoleak around the endoluminal stent in the descending aorta, unchanged; persistent nonobstructive dissection flap on the abdominal aorta and iliac arteriers as above. \par

## 2019-12-17 NOTE — CONSULT LETTER
[Dear  ___] : Dear  [unfilled], [Please see my note below.] : Please see my note below. [Sincerely,] : Sincerely, [FreeTextEntry2] : Jori Samson MD\par [address] [place] [address]\par [place] [id number]\par  [FreeTextEntry3] : \par Anant Smith M.D.\par Professor of Cardiovascular and Thoracic Surgery\par Minimally Invasive Valve Surgeon\par Director of Aortic Surgery, [facility]\par Cell: [phone]\par Email: [email or web address] \par \par [facility]:\par [address], [address] Floor, [place] [address]\par Office: [phone]\par Fax: [phone]\par \par [facility]:\par Department of Cardiovascular and Thoracic Surgery\par [address] [place] [address], [address]\par Office: [phone]\par Fax: [phone]\par \par Practice Manager: Ms. Sonia Gonzales\par Email: [email or web address]\par Phone: [phone]\par \par   [FreeTextEntry1] : I had the pleasure of seeing your patient, EVA MORFIN, in my office today. \par \par We take a multidisciplinary team approach to patient care and consider you, the physician, an extension of our team. We will maintain an open line of communication with you throughout your patient's treatment course.  \par \par As you recall, he is a 43 year year old male status post type A aortic dissection repair -- aortic valve repair, replacement of the ascending aorta and proximal aortic arch replacement, thoracic stent graft, EF 50% on 5/9/19. The patient presents to the office today for a routine follow up visit with repeat diagnostic imaging. I have enclosed a copy for your records.\par \par The surgical repair is intact and stable.  Therefore, I have recommended that the patient will follow up in the [facility] in one year with a CTA chest abdomen pelvis to monitor his surgical repair. My office will assist the patient with his upcoming appointment and I will update you on his  progress at that time.\par \par I have discussed with the patient that we will continue to monitor his aortic pathology closely at the [facility] for the [facility], that encompasses the entire health care system and is one of the largest in the nation at this point.\par \par I appreciate the opportunity to care for your patient at the [facility] for [facility] based at [facility]. If there are any questions or concerns, please call me directly at [phone]. \par \par

## 2019-12-17 NOTE — HISTORY OF PRESENT ILLNESS
[FreeTextEntry1] : 43 year old male status post type A aortic dissection repair -- aortic valve repair, replacement of the ascending aorta and proximal aortic arch replacement, thoracic stent graft, EF 50% on 5/9/19. Post-op course was complicated by atrial fibrillation; he was started on Xarelto and is s/p successful DCCV 5/16/19. He is followed by Dr. Aj. Patient is being followed for a persistent dissection flap. \par \par CTA chest abdomen pelvis  9/26/19: stable evaluation, s/p graft replacement of the ascending aorta and TEVAR of the descending aorta, evidence of endoleak around the endoluminal stent in the descending aorta, unchanged; persistent nonobstructive dissection flap on the abdominal aorta and iliac arteries as above. \par \par Patient denies any fever, chills, fatigue, syncope, acute chest pain, palpitations, SOB, orthopnea, paroxysmal nocturnal dyspnea, vomiting, abdominal pain, back pain, BRBPR or swelling to legs.

## 2020-02-10 ENCOUNTER — APPOINTMENT (OUTPATIENT)
Dept: HEART AND VASCULAR | Facility: CLINIC | Age: 45
End: 2020-02-10
Payer: COMMERCIAL

## 2020-02-10 VITALS
WEIGHT: 250 LBS | DIASTOLIC BLOOD PRESSURE: 87 MMHG | SYSTOLIC BLOOD PRESSURE: 150 MMHG | BODY MASS INDEX: 35 KG/M2 | HEART RATE: 71 BPM | HEIGHT: 71 IN

## 2020-02-10 PROCEDURE — 93285 PRGRMG DEV EVAL SCRMS IP: CPT

## 2020-02-10 RX ORDER — AMLODIPINE BESYLATE 10 MG/1
10 TABLET ORAL
Refills: 0 | Status: ACTIVE | COMMUNITY

## 2020-02-10 RX ORDER — METOPROLOL TARTRATE 100 MG/1
100 TABLET, FILM COATED ORAL
Qty: 180 | Refills: 3 | Status: DISCONTINUED | COMMUNITY
Start: 2019-05-21 | End: 2020-02-10

## 2020-02-10 RX ORDER — TELMISARTAN 80 MG/1
80 TABLET ORAL DAILY
Refills: 0 | Status: ACTIVE | COMMUNITY

## 2020-02-10 RX ORDER — AMLODIPINE BESYLATE 10 MG/1
10 TABLET ORAL DAILY
Qty: 90 | Refills: 3 | Status: DISCONTINUED | COMMUNITY
Start: 2019-05-21 | End: 2020-02-10

## 2020-02-10 RX ORDER — CARVEDILOL 12.5 MG/1
12.5 TABLET, FILM COATED ORAL
Refills: 0 | Status: ACTIVE | COMMUNITY

## 2020-02-10 NOTE — PHYSICAL EXAM
[General Appearance - Well Developed] : well developed [Normal Appearance] : normal appearance [Well Groomed] : well groomed [General Appearance - Well Nourished] : well nourished [No Deformities] : no deformities [Heart Rate And Rhythm] : heart rate and rhythm were normal [General Appearance - In No Acute Distress] : no acute distress [Heart Sounds] : normal S1 and S2 [Murmurs] : no murmurs present [Exaggerated Use Of Accessory Muscles For Inspiration] : no accessory muscle use [Respiration, Rhythm And Depth] : normal respiratory rhythm and effort [Abdomen Soft] : soft [Auscultation Breath Sounds / Voice Sounds] : lungs were clear to auscultation bilaterally [Abdomen Tenderness] : non-tender [Nail Clubbing] : no clubbing of the fingernails [Abdomen Mass (___ Cm)] : no abdominal mass palpated [Petechial Hemorrhages (___cm)] : no petechial hemorrhages [] : no ischemic changes [Cyanosis, Localized] : no localized cyanosis [Eyelids - No Xanthelasma] : the eyelids demonstrated no xanthelasmas [Normal Conjunctiva] : the conjunctiva exhibited no abnormalities [Normal Oral Mucosa] : normal oral mucosa [No Oral Cyanosis] : no oral cyanosis [No Oral Pallor] : no oral pallor [Normal Jugular Venous V Waves Present] : normal jugular venous V waves present [Normal Jugular Venous A Waves Present] : normal jugular venous A waves present [Gait - Sufficient For Exercise Testing] : the gait was sufficient for exercise testing [No Jugular Venous Bah A Waves] : no jugular venous bah A waves [Abnormal Walk] : normal gait [Oriented To Time, Place, And Person] : oriented to person, place, and time [Affect] : the affect was normal [Mood] : the mood was normal [No Anxiety] : not feeling anxious

## 2020-02-12 NOTE — HISTORY OF PRESENT ILLNESS
[FreeTextEntry1] : Mr. Dawn is a pleasant 44 year-old gentleman with a history of acute Type A dissection s/p surgical repair with Dr. Smith 5/9/19.   Post-op course was complicated by atrial fibrillation; he was started on Amiodarone and is s/p successful DCCV 5/16/19.  He is s/p ILR placement 7/8/19 for long-term heart rhythm assessment. He used the symptom activator when he got an "irregular HR" reading on his home BP machine.  He denies any awareness of palpitations, presyncope/syncope.  Reports compliance with medications, including Xarelto, and denies any bleeding issues.

## 2020-02-12 NOTE — PROCEDURE
[de-identified] : SJM CONFIRM RX\par Sensing 0.4 mV\par 2 Charles episodes c/w undersensing\par 2 Symptom episodes - EGM c/w SR with PACs

## 2020-02-12 NOTE — DISCUSSION/SUMMARY
[FreeTextEntry1] : Mr. Dawn is a 44 year-old gentleman s/p Type A aortic dissection repair complicated by post-operative atrial fibrillation.  He is s/p ILR placement for long term heart rhythm assessment.  No arrhythmia events noted on interrogation.  No medication changes recommended today.  Advised to continue Xarelto until he has no documented arrhythmia for six months.  Advised to follow-up in six months and call with any questions or concerns in the interim.

## 2020-08-10 ENCOUNTER — APPOINTMENT (OUTPATIENT)
Dept: HEART AND VASCULAR | Facility: CLINIC | Age: 45
End: 2020-08-10
Payer: COMMERCIAL

## 2020-08-10 PROCEDURE — 93285 PRGRMG DEV EVAL SCRMS IP: CPT

## 2020-08-10 PROCEDURE — 99441: CPT | Mod: 25

## 2020-08-16 NOTE — REASON FOR VISIT
[Home] : at home, [unfilled] , at the time of the visit. [Medical Office: ([facility])___] : at the medical office located in  [Verbal consent obtained from patient] : the patient, [unfilled] [Follow-up Device Check] : follow-up device check visit

## 2020-08-16 NOTE — DISCUSSION/SUMMARY
[FreeTextEntry1] : Mr. Dawn is a 44 year-old gentleman s/p Type A aortic dissection repair complicated by post-operative atrial fibrillation.  He is s/p ILR placement for long term heart rhythm assessment.  No arrhythmia events noted on interrogation. Ok to stop Xarelto given lack of AF since his surgery.  Advised to follow-up in six months and call with any questions or concerns in the interim.

## 2020-11-20 ENCOUNTER — NON-APPOINTMENT (OUTPATIENT)
Age: 45
End: 2020-11-20

## 2020-12-02 ENCOUNTER — APPOINTMENT (OUTPATIENT)
Dept: CARDIOTHORACIC SURGERY | Facility: CLINIC | Age: 45
End: 2020-12-02
Payer: COMMERCIAL

## 2020-12-07 ENCOUNTER — APPOINTMENT (OUTPATIENT)
Dept: CARDIOTHORACIC SURGERY | Facility: CLINIC | Age: 45
End: 2020-12-07
Payer: COMMERCIAL

## 2020-12-07 PROCEDURE — 99213 OFFICE O/P EST LOW 20 MIN: CPT | Mod: 95

## 2020-12-08 RX ORDER — RIVAROXABAN 20 MG/1
20 TABLET, FILM COATED ORAL
Qty: 90 | Refills: 3 | Status: DISCONTINUED | COMMUNITY
Start: 2019-09-19 | End: 2020-12-08

## 2021-01-29 ENCOUNTER — NON-APPOINTMENT (OUTPATIENT)
Age: 46
End: 2021-01-29

## 2021-02-08 ENCOUNTER — APPOINTMENT (OUTPATIENT)
Dept: HEART AND VASCULAR | Facility: CLINIC | Age: 46
End: 2021-02-08

## 2021-03-12 ENCOUNTER — NON-APPOINTMENT (OUTPATIENT)
Age: 46
End: 2021-03-12

## 2021-03-12 ENCOUNTER — APPOINTMENT (OUTPATIENT)
Dept: HEART AND VASCULAR | Facility: CLINIC | Age: 46
End: 2021-03-12
Payer: COMMERCIAL

## 2021-03-12 PROCEDURE — 93298 REM INTERROG DEV EVAL SCRMS: CPT

## 2021-03-12 PROCEDURE — G2066: CPT

## 2021-03-24 ENCOUNTER — NON-APPOINTMENT (OUTPATIENT)
Age: 46
End: 2021-03-24

## 2021-04-10 ENCOUNTER — EMERGENCY (EMERGENCY)
Facility: HOSPITAL | Age: 46
LOS: 1 days | Discharge: HOME | End: 2021-04-10
Admitting: EMERGENCY MEDICINE
Payer: COMMERCIAL

## 2021-04-10 VITALS
DIASTOLIC BLOOD PRESSURE: 80 MMHG | HEIGHT: 71 IN | RESPIRATION RATE: 16 BRPM | TEMPERATURE: 98 F | OXYGEN SATURATION: 97 % | SYSTOLIC BLOOD PRESSURE: 137 MMHG | HEART RATE: 69 BPM

## 2021-04-10 DIAGNOSIS — Z98.890 OTHER SPECIFIED POSTPROCEDURAL STATES: Chronic | ICD-10-CM

## 2021-04-10 LAB — SARS-COV-2 RNA SPEC QL NAA+PROBE: SIGNIFICANT CHANGE UP

## 2021-04-10 PROCEDURE — 99283 EMERGENCY DEPT VISIT LOW MDM: CPT

## 2021-04-10 PROCEDURE — 99282 EMERGENCY DEPT VISIT SF MDM: CPT

## 2021-04-10 PROCEDURE — U0003: CPT

## 2021-04-10 PROCEDURE — U0005: CPT

## 2021-04-10 NOTE — ED PROVIDER NOTE - PATIENT PORTAL LINK FT
You can access the FollowMyHealth Patient Portal offered by [facility] by registering at the following website: [email or web address]. By joining [employer]s FollowMyHealth portal, you will also be able to view your health information using other applications (apps) compatible with our system.

## 2021-04-12 ENCOUNTER — APPOINTMENT (OUTPATIENT)
Dept: HEART AND VASCULAR | Facility: CLINIC | Age: 46
End: 2021-04-12

## 2021-04-12 ENCOUNTER — OUTPATIENT (OUTPATIENT)
Dept: OUTPATIENT SERVICES | Facility: HOSPITAL | Age: 46
LOS: 1 days | Discharge: HOME | End: 2021-04-12
Payer: COMMERCIAL

## 2021-04-12 DIAGNOSIS — Z20.822 CONTACT WITH AND (SUSPECTED) EXPOSURE TO COVID-19: ICD-10-CM

## 2021-04-12 DIAGNOSIS — Z79.899 OTHER LONG TERM (CURRENT) DRUG THERAPY: ICD-10-CM

## 2021-04-12 DIAGNOSIS — Z98.890 OTHER SPECIFIED POSTPROCEDURAL STATES: Chronic | ICD-10-CM

## 2021-04-12 DIAGNOSIS — Z79.82 LONG TERM (CURRENT) USE OF ASPIRIN: ICD-10-CM

## 2021-04-12 DIAGNOSIS — Z79.01 LONG TERM (CURRENT) USE OF ANTICOAGULANTS: ICD-10-CM

## 2021-04-12 PROCEDURE — 33286 RMVL SUBQ CAR RHYTHM MNTR: CPT

## 2021-05-10 ENCOUNTER — APPOINTMENT (OUTPATIENT)
Dept: HEART AND VASCULAR | Facility: CLINIC | Age: 46
End: 2021-05-10

## 2021-05-17 ENCOUNTER — APPOINTMENT (OUTPATIENT)
Dept: HEART AND VASCULAR | Facility: CLINIC | Age: 46
End: 2021-05-17

## 2021-06-17 NOTE — PRE-OP CHECKLIST - HOW ADMINISTERED
Artificial tears 6 times a day in both eyes.  Artificial tears ointment at bedtime in both eyes.  (Systane, Genteal and Refresh are some brands.  Any brand is fine).      Get Prompt Medical Attention (Check one eye at a time by covering the other eye).  If any of the following signs of a new retinal tear occur:  · Any sudden changes in your vision.  · Light flashes.  · Curtain moving across part of your visual field.  · Burst of new floaters (small dots or strings that seem to be moving across your field of vision).  
See MAR for last dose taken

## 2021-06-28 NOTE — ED ADULT NURSE NOTE - RESPIRATORY WDL
Initial Anesthesia Post-op Note    Patient: Leatha Vale  Procedure(s) Performed: PACEMAKER IMPLANT - CV  Anesthesia type: MAC    Vitals Value Taken Time   Temp 36.3 06/28/21 1609   Pulse 78 06/28/21 1609   Resp 12 06/28/21 1609   SpO2 98 % 06/28/21 1608   /82 06/28/21 1607   Vitals shown include unvalidated device data.      Patient Location: PACU Phase 1  Post-op Vital Signs:stable  Level of Consciousness: sedated  Respiratory Status: spontaneous ventilation and nasal cannula  Cardiovascular stable  Hydration: euvolemic  Pain Management: adequately controlled  Handoff: Handoff to receiving nurse was performed and questions were answered  Vomiting: none  Nausea: None  Airway Patency:patent  Post-op Assessment: no complications, patient tolerated procedure well with no complications, no evidence of recall and dentition within defined limits      There were no known complications for this encounter.   Breathing spontaneous and unlabored. Breath sounds clear and equal bilaterally with regular rhythm.

## 2021-12-22 ENCOUNTER — APPOINTMENT (OUTPATIENT)
Dept: CARDIOTHORACIC SURGERY | Facility: CLINIC | Age: 46
End: 2021-12-22
Payer: COMMERCIAL

## 2021-12-22 PROCEDURE — 99213 OFFICE O/P EST LOW 20 MIN: CPT | Mod: 95

## 2022-01-02 NOTE — HISTORY OF PRESENT ILLNESS
[Home] : at home, [unfilled] , at the time of the visit. [Medical Office: ([facility])___] : at the medical office located in  [Verbal consent obtained from patient] : the patient, [unfilled] [FreeTextEntry1] : 44 year old male status post type A aortic dissection repair -- aortic valve repair, replacement of the ascending aorta and proximal aortic arch replacement, thoracic stent graft, EF 50% on 5/9/19, postop atrial fibrillation s/p successful DCCV 5/16/19 (no longer on NOAC), and ILR placement 7/8/19. Patient presents for a 1 year follow up visit for evaluation and management of his aortic pathology. \par \par Patient is doing well and denies recent hospitalization, ER visits, or surgeries. In October he had an upper respiratory infection that lasted 3-4 weeks. He denies fever, chills, fatigue, headache, blurred vision, dizziness, syncope, chest pain, palpitations, shortness of breath, orthopnea, paroxysmal nocturnal dyspnea, nausea, vomiting, abdominal pain, back pain, BRBPR or swelling to legs. His last home BP reading was 130/89.\par  \par \par \par \par \par

## 2022-01-02 NOTE — CONSULT LETTER
[Dear  ___] : Dear  [unfilled], [Please see my note below.] : Please see my note below. [Sincerely,] : Sincerely, [FreeTextEntry2] : Jori Samson MD\par [address] [place] [address]\par [place] [address]\par  [FreeTextEntry1] : I had the pleasure of seeing your patient, EVA MORFIN, in my office today. \par \par We take a multidisciplinary team approach to patient care and consider you, the referring physician, an extension of our team. We will maintain an open line of communication with you throughout your patient's treatment course.  \par \par As you recall, he is a 46 year old male status post type A aortic dissection repair -- aortic valve repair, replacement of the ascending aorta and proximal aortic arch replacement, thoracic stent graft, EF 50% on 5/9/19. The patient presents to the office today for a routine follow up visit with repeat diagnostic imaging. I have enclosed a copy for your records.\par \par The surgical repair is intact and stable. Therefore, I have recommended that the patient will follow up in the [facility] in 1 year with a CTA chest to monitor his surgical repair. My office will assist the patient with his upcoming appointment and I will update you on his  progress at that time.\par \par I have discussed with the patient that we will continue to monitor his aortic pathology closely at the [facility] for the [facility], that encompasses the entire health care system and is one of the largest in the nation at this point.\par \par I appreciate the opportunity to care for your patient at the [facility] for [facility] based at [facility]. If there are any questions or concerns, please call me directly at [phone]. \par \par  [FreeTextEntry3] : Anant Smith M.D.\par Professor of Cardiovascular and Thoracic Surgery\par Minimally Invasive Valve Surgeon\par Director of Aortic Surgery, [facility]\par Cell: [phone]\par Email: [email or web address] \par \par [facility]:\par [address] Floor, [place] [address]\par Office: [phone]\par Fax: [phone]\par \par [facility]:\par Department of Cardiovascular and Thoracic Surgery\par [address] [place] [address], [address]\par Office: [phone]\par Fax: [phone]\par \par \par Practice Manager: Ms. Sonia Gonzales\par Email: [email or web address] \par Phone: [phone]

## 2022-01-02 NOTE — DATA REVIEWED
[FreeTextEntry1] : CTA chest abdomen pelvis  9/26/19: stable evaluation, s/p graft replacement of the ascending aorta and TEVAR of the descending aorta, evidence of endoleak around the endoluminal stent in the descending aorta, unchanged; persistent nonobstructive dissection flap on the abdominal aorta and iliac arteries as above. \par \par CTA chest, abdomen and pelvis 11/30/20:\par aortic stent graft in the extending from the aortic arch into the mid to distal descending thoracic aorta. Aortic dissection flap extends proximally into the right innominate artery and the proximal right common carotid. the dissection flap extends distally into the descending thoracic aorta, upper abdominal aorta and bilateral common iliac arteries. \par pulmonary findings noted in CT. \par \par CTA chest 12/17/21: \par Status post aortic repair, residual dissection flaps as noted above\par Worsening pulmonary infection/inflammation as noted\par

## 2022-01-02 NOTE — ASSESSMENT
[FreeTextEntry1] : 46 year old male status post type A aortic dissection repair -- aortic valve repair, replacement of the ascending aorta and proximal aortic arch replacement, thoracic stent graft, EF 50% on 5/9/19, postop atrial fibrillation s/p successful DCCV 5/16/19 (no longer on NOAC), and ILR placement 7/8/19.  Patient presents for a follow up visit for evaluation and management of aortic pathology. \par \par I have reviewed the patient's medical records, diagnostic images during the time of this office consultation and have made the following recommendation. The surgical repair is intact and stable.\par Plan\par \par 1. Follow up in [facility] in 1 year with a repeat CTA chest \par 2. Continue medication regimen.\par 3. Follow up with cardiologist and PCP.\par 4. Blood pressure management.\par 5. Pt advised to speak with cardiologist/PCP for referral to pulm for evaluation of ground glass densities--pt made aware of lung findings\par

## 2022-01-02 NOTE — END OF VISIT
[Time Spent: ___ minutes] : I have spent [unfilled] minutes of time on the encounter. [FreeTextEntry3] : I, LIDIA PLATA , am scribing for and in the presence of AVIVA HALLMAN the following sections: History of present illness, past Medical/family/surgical/family/social history, review of systems, vital signs, physical exam and disposition.\par  \par I personally performed the services described in the documentation, reviewed the documentation recorded by the scribe in my presence and it accurately and completely records my words and actions.\par

## 2022-12-02 NOTE — PROCEDURE
n/a [FreeTextEntry1] : Dr. Smith discussed activity restrictions with the patient, and would advise exercise at a moderate amount with no heavy lifting over one third of body weight, and avoiding heart rates that exceed 140 beats per minute. In addition, every patient should abstain from tobacco abuse and to avoid all illicit drug use, especially stimulants such as cocaine or methamphetamine. Dr. Smith also counseled regarding maintaining a healthy heart diet, and losing any excessive weight as this also put undue stress on both the aorta and entire cardiovascular system. First degree family members should be screened for bicuspid valve disease, and ascending aortic aneurysms. \par \par Patient was advised to view the educational video prior to this visit regarding aortic pathology, risk factors, surgical procedures, and lifestyle modifications. Video can be retrieved at [email or web address]/[email or web address]=youtu.be.\par

## 2023-01-04 ENCOUNTER — APPOINTMENT (OUTPATIENT)
Dept: CARDIOTHORACIC SURGERY | Facility: CLINIC | Age: 48
End: 2023-01-04
Payer: COMMERCIAL

## 2023-01-04 ENCOUNTER — NON-APPOINTMENT (OUTPATIENT)
Age: 48
End: 2023-01-04

## 2023-01-18 ENCOUNTER — APPOINTMENT (OUTPATIENT)
Dept: CARDIOTHORACIC SURGERY | Facility: CLINIC | Age: 48
End: 2023-01-18
Payer: COMMERCIAL

## 2023-01-18 DIAGNOSIS — Z09 ENCOUNTER FOR FOLLOW-UP EXAMINATION AFTER COMPLETED TREATMENT FOR CONDITIONS OTHER THAN MALIGNANT NEOPLASM: ICD-10-CM

## 2023-01-18 PROCEDURE — 99212 OFFICE O/P EST SF 10 MIN: CPT | Mod: 95

## 2023-01-18 PROCEDURE — 99202 OFFICE O/P NEW SF 15 MIN: CPT | Mod: 95

## 2023-01-18 NOTE — REASON FOR VISIT
[Home] : at home, [unfilled] , at the time of the visit. [Medical Office: ([facility])___] : at the medical office located in  [Patient] : the patient [Self] : self

## 2023-01-24 NOTE — ASSESSMENT
[FreeTextEntry1] : 47 year old male status post type A aortic dissection repair -- aortic valve repair, replacement of the ascending aorta and proximal aortic arch replacement, thoracic stent graft, EF 50% on 5/9/19, postop atrial fibrillation s/p successful DCCV 5/16/19 (no longer on NOAC), and ILR placement 7/8/19.  Patient presents for a follow up visit for evaluation and management of aortic pathology. \par \par I have reviewed the patient's medical records, diagnostic images during the time of this office consultation and have made the following recommendation. The surgical repair is intact and stable.\par \par Plan\par 1. Follow up in [facility] in 1 year with a CTA chest, abdomen and pelvis .\par 2. Continue medication regimen.\par 3. Follow up with cardiologist and PCP.\par 4. Blood pressure management.\par

## 2023-01-24 NOTE — DATA REVIEWED
[FreeTextEntry1] : CTA chest abdomen pelvis  9/26/19: stable evaluation, s/p graft replacement of the ascending aorta and TEVAR of the descending aorta, evidence of endoleak around the endoluminal stent in the descending aorta, unchanged; persistent nonobstructive dissection flap on the abdominal aorta and iliac arteries as above. \par \par CTA chest, abdomen and pelvis 11/30/20:\par aortic stent graft in the extending from the aortic arch into the mid to distal descending thoracic aorta. Aortic dissection flap extends proximally into the right innominate artery and the proximal right common carotid. the dissection flap extends distally into the descending thoracic aorta, upper abdominal aorta and bilateral common iliac arteries. \par pulmonary findings noted in CT. \par \par CTA chest 12/17/21: \par Status post aortic repair, residual dissection flaps as noted above\par Worsening pulmonary infection/inflammation as noted\par \par  \par \par CTA Chest 12/30/22: \par Status post aortic stent graft extending from the aortic arch to the mid descending

## 2023-01-24 NOTE — HISTORY OF PRESENT ILLNESS
[FreeTextEntry1] : 47 year old male status post type A aortic dissection repair -- aortic valve repair, replacement of the ascending aorta and proximal aortic arch replacement, thoracic stent graft, EF 50% on 5/9/19, postop atrial fibrillation s/p successful DCCV 5/16/19 (no longer on NOAC), and ILR placement 7/8/19. Patient presents for a 1 year follow up visit for evaluation and management of his aortic pathology. \par \par Patient is doing well and denies recent hospitalization, ER visits, or surgeries. He  denies fever, chills, fatigue, headache, blurred vision, dizziness, syncope, chest pain, palpitations, shortness of breath, orthopnea, paroxysmal nocturnal dyspnea, nausea, vomiting, abdominal pain, back pain, BRBPR or swelling to legs.\par

## 2023-01-24 NOTE — CONSULT LETTER
[Dear  ___] : Dear  [unfilled], [Please see my note below.] : Please see my note below. [Sincerely,] : Sincerely, [FreeTextEntry2] : Jori Samson MD\par [address] [place] [address]\par [place] [address]\par  [FreeTextEntry1] : I had the pleasure of seeing your patient, EVA MORFIN, in my office today. \par \par We take a multidisciplinary team approach to patient care and consider you, the referring physician, an extension of our team. We will maintain an open line of communication with you throughout your patient's treatment course.  \par \par As you recall, he is a 46 year old male status post type A aortic dissection repair -- aortic valve repair, replacement of the ascending aorta and proximal aortic arch replacement, thoracic stent graft, EF 50% on 5/9/19. The patient presents to the office today for a routine follow up visit with repeat diagnostic imaging. I have enclosed a copy for your records.\par \par The surgical repair is intact and stable. Therefore, I have recommended that the patient will follow up in the [facility] in 1 year with a CTA chest to monitor his surgical repair. My office will assist the patient with his upcoming appointment and I will update you on his  progress at that time.\par \par I have discussed with the patient that we will continue to monitor his aortic pathology closely at the [facility] for the [facility], that encompasses the entire health care system and is one of the largest in the nation at this point.\par \par I appreciate the opportunity to care for your patient at the [facility] for [facility] based at [facility]. If there are any questions or concerns, please call me directly at [phone]. \par \par  [FreeTextEntry3] : Anant Smith M.D.\par Professor of Cardiovascular and Thoracic Surgery\par Minimally Invasive Valve Surgeon\par Director of Aortic Surgery, [facility]\par Cell: [phone]\par Email: [email or web address] \par \par [facility]:\par [address] Floor, [place] [address]\par Office: [phone]\par Fax: [phone]\par \par [facility]:\par Department of Cardiovascular and Thoracic Surgery\par [address] [place] [address], [address]\par Office: [phone]\par Fax: [phone]\par \par \par Practice Manager: Ms. Sonia Gonzales\par Email: [email or web address] \par Phone: [phone]

## 2023-01-24 NOTE — PROCEDURE
[FreeTextEntry1] : Dr. Smith discussed activity restrictions with the patient, and would advise exercise at a moderate amount with no heavy lifting over one third of body weight, and avoiding heart rates that exceed 140 beats per minute. In addition, every patient should abstain from tobacco abuse and to avoid all illicit drug use, especially stimulants such as cocaine or methamphetamine. Dr. Smith also counseled regarding maintaining a healthy heart diet, and losing any excessive weight as this also put undue stress on both the aorta and entire cardiovascular system. First degree family members should be screened for bicuspid valve disease, and ascending aortic aneurysms. \par \par Patient was advised to view the educational video prior to this visit regarding aortic pathology, risk factors, surgical procedures, and lifestyle modifications. Video can be retrieved at [email or web address]/watch?[email or web address]&feature=youtu.be.\par

## 2023-01-24 NOTE — END OF VISIT
[Time Spent: ___ minutes] : I have spent [unfilled] minutes of time on the encounter. [FreeTextEntry3] : I, LIDIA RUANOTIZ , am scribing for and in the presence of AVIVA HALLMAN the following sections: History of present illness, past Medical/family/surgical/family/social history, review of systems, vital signs, physical exam and disposition.\par  \par I personally performed the services described in the documentation, reviewed the documentation recorded by the scribe in my presence and it accurately and completely records my words and actions.\par \par I personally performed the services described in the documentation, reviewed the documentation recorded by the scribe in my presence and it accurately and completely records my words and actions.\par

## 2023-06-01 NOTE — H&P ADULT - ASSESSMENT
I certify that consent for this consult was obtained from the patient or authorized family member.
43 year old male with no known medical history who presented to [facility] on 5/9/19 with acute onset substernal chest pain radiating to back with associated dizziness and b/l upper extremities paresthesia.  VS stable on presentation with moderate HTN, 143/76mmHg, with TYRESE seen on imaging, 16/1.50 and negative troponin.  He underwent CTA C/A/P which demonstrated acute Type A dissection and was transferred to [facility] emergently to undergo surgical repair with Dr. Smith.     Neurovascular: No delirium, pain well managed on current regimen  -To remain sedated while intubated.    -C/w PRNs for Pain control  -Monitor neuro status    Respiratory: Saturates well on CMV   -CXR post-op, f/u results   -To remain intubated and sedated, plan for extubation when medically ready.   -Monitor respiratory status via SpO2  -Management per ICU protocol     Cardiovascular: Type A dissection, s/p emergent repair, EF 50%  -Maintain MAP goal 65  -Cleviprex started for BP control   -Post-surgical management per CTICU protocol   -Monitor HR/BP/Tele    GI: NPO  -Prophylaxis: Protonix    /Renal: TYRESE on initial presentation  -BUN/Cr: 14/1.24  -Trend Cr on AM labs  -Replete electrolytes as needed    ID: Afebrile, asymptomatic  -Complete periop abx  -WCC: 6  -Continue to monitor for SIRS/Sepsis syndrome while inpatient    Endo:  -A1C: pending  -TSH: pending    Heme:   -H/H: stable  -CBC, chem in AM  -DVT ppx: HSQ 7500 u q8h and SCDs    Disposition: Admit to [facility].

## 2023-06-06 NOTE — ED PROVIDER NOTE - DISCHARGE DATE
Labs - routine physical labs    Physical therapy for your right inner upper leg pain.  Keep me updated-schedule with physical therapy and send me an update after a couple of PT sessions   15-Jul-2019

## 2024-01-22 ENCOUNTER — NON-APPOINTMENT (OUTPATIENT)
Age: 49
End: 2024-01-22

## 2024-01-31 ENCOUNTER — APPOINTMENT (OUTPATIENT)
Dept: CARDIOTHORACIC SURGERY | Facility: CLINIC | Age: 49
End: 2024-01-31
Payer: COMMERCIAL

## 2024-01-31 DIAGNOSIS — Z98.890 OTHER SPECIFIED POSTPROCEDURAL STATES: ICD-10-CM

## 2024-01-31 PROCEDURE — 99213 OFFICE O/P EST LOW 20 MIN: CPT

## 2024-02-02 NOTE — CONSULT LETTER
[Dear  ___] : Dear  [unfilled], [Courtesy Letter:] : I had the pleasure of seeing your patient, [unfilled], in my office today. [Please see my note below.] : Please see my note below. [FreeTextEntry2] : Jori Samson MD [address] [place] [address] Phone  [phone] [FreeTextEntry1] : Please find attached our consultation on your patient, Mr. EVA MORFIN .   We take a multidisciplinary team approach to patient care and consider you, the referring physician, an extension of our team. We will maintain an open line of communication with you throughout your patient's treatment course.    It is our commitment to provide your patient with the highest quality of advanced therapeutic options. We thank you for allowing us to participate in the care of your patient.  Please do not hesitate to contact our team with any questions or concerns at [phone].   [FreeTextEntry3] : Sincerely,       Anant Smith M.D. Professor of Cardiovascular and Thoracic Surgery Minimally Invasive Valve Surgeon Director of Aortic Surgery, [facility] Cell: [phone] Email: [email or web address]: [address], [address] Floor, [place] [address] Office: [phone] Fax: [phone]  [facility]: Department of Cardiovascular and Thoracic Surgery [address] [place] [address], [facility] Office: [phone] Fax: [phone]  Practice Manager: Ms. Sonia Gonzales Email: [email or web address] Phone: [phone]

## 2024-02-02 NOTE — END OF VISIT
[Time Spent: ___ minutes] : I have spent [unfilled] minutes of time on the encounter. [FreeTextEntry3] : I, AVIVA Arteaga personally performed the evaluation and management (E/M) services for this established patient who presents today with (a) new problem(s)/exacerbation of (an) existing condition(s).  That E/M includes conducting the clinically appropriate interval history &/or exam, assessing all new/exacerbated conditions, and establishing a new plan of care.  Today, my ALY, was here to observe &/or participate in the visit & follow plan of care established by me.

## 2024-02-02 NOTE — HISTORY OF PRESENT ILLNESS
[FreeTextEntry1] : 48 year old male status post type A aortic dissection repair -- aortic valve repair, replacement of the ascending aorta and proximal aortic arch replacement, thoracic stent graft, EF 50% on 5/9/19, postop atrial fibrillation s/p successful DCCV 5/16/19 (no longer on NOAC), and ILR placement 7/8/19, stage I colon s/p resection. Patient presents for a 1 year follow up visit for evaluation and management of his aortic pathology.   CTA 1/22/24 Aortic dissection is stable in appearance from prior examination. 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

## 2024-02-02 NOTE — DATA REVIEWED
[FreeTextEntry1] : CTA chest abdomen pelvis  9/26/19: stable evaluation, s/p graft replacement of the ascending aorta and TEVAR of the descending aorta, evidence of endoleak around the endoluminal stent in the descending aorta, unchanged; persistent nonobstructive dissection flap on the abdominal aorta and iliac arteries as above.   CTA chest, abdomen and pelvis 11/30/20: aortic stent graft in the extending from the aortic arch into the mid to distal descending thoracic aorta. Aortic dissection flap extends proximally into the right innominate artery and the proximal right common carotid. the dissection flap extends distally into the descending thoracic aorta, upper abdominal aorta and bilateral common iliac arteries.  pulmonary findings noted in CT.   CTA chest 12/17/21:  Status post aortic repair, residual dissection flaps as noted above Worsening pulmonary infection/inflammation as noted     CTA Chest 12/30/22:  Status post aortic stent graft extending from the aortic arch to the mid descending

## 2024-02-02 NOTE — ASSESSMENT
[FreeTextEntry1] : I have reviewed the patient's medical records, diagnostic images during the time of this office consultation and have made the following recommendation. CT was completed of the thoracic aorta. The report was reviewed and noted in the chart, I independently reviewed and interpreted images and report and I reviewed the films/CD and agree. The surgical repair is intact and stable. Plan  - [facility] one year with [facility]. - TTE now to be scheduled to evaluate aortic valve and function.  - Continue medication regimen. - Follow up with cardiologist and PCP. - Blood pressure management. - Discussed signs and symptoms that warrant emergency medical attention.

## 2024-02-14 ENCOUNTER — OUTPATIENT (OUTPATIENT)
Dept: OUTPATIENT SERVICES | Facility: HOSPITAL | Age: 49
LOS: 1 days | End: 2024-02-14
Payer: COMMERCIAL

## 2024-02-14 ENCOUNTER — RESULT REVIEW (OUTPATIENT)
Age: 49
End: 2024-02-14

## 2024-02-14 DIAGNOSIS — Z98.890 OTHER SPECIFIED POSTPROCEDURAL STATES: ICD-10-CM

## 2024-02-14 DIAGNOSIS — Z98.890 OTHER SPECIFIED POSTPROCEDURAL STATES: Chronic | ICD-10-CM

## 2024-02-14 PROCEDURE — 93306 TTE W/DOPPLER COMPLETE: CPT | Mod: 26

## 2024-02-14 PROCEDURE — 93306 TTE W/DOPPLER COMPLETE: CPT

## 2025-01-29 ENCOUNTER — APPOINTMENT (OUTPATIENT)
Dept: CARDIOTHORACIC SURGERY | Facility: CLINIC | Age: 50
End: 2025-01-29

## 2025-02-26 ENCOUNTER — NON-APPOINTMENT (OUTPATIENT)
Age: 50
End: 2025-02-26

## 2025-02-26 ENCOUNTER — APPOINTMENT (OUTPATIENT)
Dept: CARDIOTHORACIC SURGERY | Facility: CLINIC | Age: 50
End: 2025-02-26
Payer: COMMERCIAL

## 2025-02-26 VITALS
BODY MASS INDEX: 36.4 KG/M2 | TEMPERATURE: 97.4 F | HEIGHT: 71 IN | WEIGHT: 260 LBS | DIASTOLIC BLOOD PRESSURE: 94 MMHG | OXYGEN SATURATION: 96 % | HEART RATE: 73 BPM | SYSTOLIC BLOOD PRESSURE: 178 MMHG

## 2025-02-26 DIAGNOSIS — Z98.890 OTHER SPECIFIED POSTPROCEDURAL STATES: ICD-10-CM

## 2025-02-26 PROCEDURE — 99214 OFFICE O/P EST MOD 30 MIN: CPT
